# Patient Record
Sex: FEMALE | Race: WHITE | Employment: OTHER | URBAN - METROPOLITAN AREA
[De-identification: names, ages, dates, MRNs, and addresses within clinical notes are randomized per-mention and may not be internally consistent; named-entity substitution may affect disease eponyms.]

---

## 2018-06-06 ENCOUNTER — HOSPITAL ENCOUNTER (OUTPATIENT)
Dept: PHYSICAL THERAPY | Age: 68
Discharge: HOME | End: 2018-06-06
Payer: MEDICARE

## 2018-06-06 PROCEDURE — G8978 MOBILITY CURRENT STATUS: HCPCS

## 2018-06-06 PROCEDURE — G8979 MOBILITY GOAL STATUS: HCPCS

## 2018-06-06 PROCEDURE — 97162 PT EVAL MOD COMPLEX 30 MIN: CPT

## 2018-06-06 PROCEDURE — 97110 THERAPEUTIC EXERCISES: CPT

## 2018-06-06 PROCEDURE — 97140 MANUAL THERAPY 1/> REGIONS: CPT

## 2018-06-06 NOTE — THERAPY EVALUATION
Pasty Holstein  : 1950  Primary: Marge Mi Medicare Choice *  Secondary:  [facility] at [address], [place], [address] [place] [address] Street  [phone]   [phone]       OUTPATIENT PHYSICAL THERAPY:Initial Assessment 2018    ICD-10: Treatment Diagnosis: Pain in right knee (M25.561)  Treatment diagnosis 2: Pain in right hip (M25.551)  Treatment diagnosis 3: Pain in unspecified joint (M25.50)  Precautions: *FALL RISK*  Allergies: Review of patient's allergies indicates not on file. Fall Risk Score: 6 (? 5 = High Risk)  MD Orders: evaluate and treat  MEDICAL/REFERRING DIAGNOSIS:  Pain in unspecified joint [M25.50]  Right knee pain [M25.561]   DATE OF ONSET: Pain in right hip and knee for 2-3 months  REFERRING PHYSICIAN: Cirilo Berry MD  RETURN PHYSICIAN APPOINTMENT: 18     INITIAL ASSESSMENT:  Ms. Janee Tejada is a 76 y.o. female presenting to physical therapy with complaints of pain in right hip and knee for the past 2-3 months. Patient reports no specific injury prior to onset of pain. Pain is greater in the knee compared to hip. Pain is increased with activity and prolonged standing. Pain is 8/10 at worst and 4/10 at best.  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 Patient reports having a fall about 1 month ago but did not seek medical treatment. 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 Patient is a good candidate for skilled physical therapy services to include manual therapy, therapeutic exercise, and pain modalities as needed. PROBLEM LIST (Impacting functional limitations):  1. Decreased Strength  2. Decreased ADL/Functional Activities  3. Decreased Ambulation Ability/Technique  4. Decreased Balance  5. Increased Pain  6. Decreased Activity Tolerance  7. Increased Fatigue  8.  Decreased Flexibility/Joint Mobility INTERVENTIONS PLANNED:  1. Balance Exercise  2. Cold  3. Cryotherapy  4. Electrical Stimulation  5. Heat  6. Home Exercise Program (HEP)  7. Manual Therapy  8. Neuromuscular Re-education/Strengthening  9. Range of Motion (ROM)  10. Therapeutic Activites  11. Therapeutic Exercise/Strengthening  12. Transcutaneous Electrical Nerve Stimulation (TENS)  13. Ultrasound (US)   TREATMENT PLAN:  Effective Dates: 6/6/2018 TO 7/7/2018 (30 days). Frequency/Duration: 2 times a week for 30 Days  GOALS: (Goals have been discussed and agreed upon with patient.)  Short-Term Functional Goals: Time Frame: 6/6/18  1. Patient will be independent with home program to increase knee range of motion. 2. Patient will report no more than 7/10 pain in right knee during work shift. Discharge Goals: Time Frame: 6/6/18 to 7/6/18  1. Patient will report no more than 5/10 pain in right knee at the end of work shift. 2. Patient will increase range of motion in right knee to 0-115 degrees. 3. Patient will increase strength in bilateral hip abduction to 4/5.  4. Patient will be able to sit with right leg crossed over left in order to put on socks/shoes. 5. Patient will perform single leg balance greater than 10 seconds bilaterally to increase stability in standing. 6. Patient will improve Lower extremity functional scale score to 48/80 from 19/80. Rehabilitation Potential For Stated Goals: Good  Regarding Esther Ma's therapy, I certify that the treatment plan above will be carried out by a therapist or under their direction. Thank you for this referral,  Duy Serrato PT     Referring Physician Signature: Omkar Erickson MD              Date                    The information in this section was collected on 6/6/18 (except where otherwise noted). HISTORY:   History of Present Injury/Illness (Reason for Referral):  Ms. Matthew Veras is a 76 y.o. female presenting to physical therapy with complaints of pain in right hip and knee for the past 2-3 months.   Patient reports no specific injury prior to onset of pain. Pain is greater in the knee compared to hip. Pain is increased with activity and prolonged standing. Pain is 8/10 at worst and 4/10 at best.  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 Patient reports having a fall about 1 month ago but did not seek medical treatment. Patient works part time (20 hours a week) at a [employer] shop. Past Medical History/Comorbidities:   Ms. Flex Hannon  has no past medical history on file. Ms. Flex Hannon  has no past surgical history on file. Social History/Living Environment:    Patient lives at home with family. Prior Level of Function/Work/Activity:  Patient works part time (20 hours per week) at [employer]. Current Medications:     No current outpatient prescriptions on file. Daun Chard pressure  Levothyroxine-thyroid  Fametidine-heart burn  Tramadol-back pain  Ondansetron-nausea  Diphen/atropine-diarrhea     Date Last Reviewed:  6/7/2018   Number of Personal Factors/Comorbidities that affect the Plan of Care:  (age/work status, increased BMI, arhtirtis) 1-2: MODERATE COMPLEXITY   EXAMINATION:   Observation/Orthostatic Postural Assessment:          Patient ambulates independently with no significant gait deficits. Palpation:          Tender to medial joint line and inferior to patella. Tender along right IT band  ROM:            Knee AROM: left=0-115 degrees, right=02-90 degrees  Hamstrings: marked tightness bilaterally    Strength:            Knee extension: left=5/5, right=4-/5  Knee flexion: 4+/5, right=3+/5  Hip flexion: left=3+/5 with pain in back, right=3-/5  Hip adduction (supine): 3+/5 bilaterally  Hip abduction (supine): 3+/5 bilaterally    Special Tests:             Thessaly test=negative    Neurological Screen:        Myotomes:  Bilateral lower extremities are normal        Dermatomes:  Intact for light touch and sensation    Functional Mobility:         Transfers:  Mild difficulty        Bed Mobility:  Marked difficulty with bed rolling, patient unable to lay completely supine due to back pain    Balance:          Single leg stance=less than 3 seconds bilaterally     Body Structures Involved:  1. Bones  2. Joints  3. Muscles Body Functions Affected:  1. Sensory/Pain  2. Neuromusculoskeletal  3. Movement Related Activities and Participation Affected:  1. General Tasks and Demands  2. Mobility  3. Domestic Life  4. Community, Social and [place]   Number of elements (examined above) that affect the Plan of Care: 3: MODERATE COMPLEXITY   CLINICAL PRESENTATION:   Presentation: Evolving clinical presentation with changing clinical characteristics: MODERATE COMPLEXITY   CLINICAL DECISION MAKING:   Outcome Measure: Tool Used: Lower Extremity Functional Scale (LEFS)  Score:  Initial: 19/80 Most Recent: X/80 (Date: -- )   Interpretation of Score: 20 questions each scored on a 5 point scale with 0 representing \"extreme difficulty or unable to perform\" and 4 representing \"no difficulty\". The lower the score, the greater the functional disability. 80/80 represents no disability. Minimal detectable change is 9 points. Score 80 79-63 62-48 47-32 31-16 15-1 0   Modifier CH CI CJ CK CL CM CN     ? Mobility - Walking and Moving Around:     - CURRENT STATUS: CL - 60%-79% impaired, limited or restricted    - GOAL STATUS: CJ - 20%-39% impaired, limited or restricted    - D/C STATUS:  ---------------To be determined---------------    Medical Necessity:   · Patient is expected to demonstrate progress in strength, range of motion, balance, coordination and functional technique to decrease pain and increase tolerance for ADL's, weight bearing, and work duties. · Skilled intervention continues to be required due to pain and limitation in right hip and knee.   Reason for Services/Other Comments:  · Patient continues to require skilled intervention due to pain and limitation in right hip and knee. Use of outcome tool(s) and clinical judgement create a POC that gives a:  (mulitple co-morbidities, moderate to marked pain, marked limitation on outcome measure) Questionable prediction of patient's progress: MODERATE COMPLEXITY            TREATMENT:   (In addition to Assessment/Re-Assessment sessions the following treatments were rendered)  Pre-treatment Symptoms/Complaints:  Patient reports increased pain in right hip and knee with prolonged standing and activity, especially when working at [employer]. Pain: Initial:   Pain Intensity 1: 6  Pain Location 1: Knee  Pain Orientation 1: Right  Post Session:  Patient reports 5/10 pain     Therapeutic Exercise: (15 Minutes):  Exercises per grid below to improve mobility, strength, balance and coordination. Required minimal visual, verbal and manual cues to promote proper body alignment, promote proper body posture and promote proper body mechanics. Progressed resistance, range, repetitions and complexity of movement as indicated. Date:  6/6/18 Date:   Date:     Activity/Exercise Parameters Parameters Parameters   Heel slides [id number], AROM     Hip adduction 1x10, supine, ball squeeze     Supine march 1x10, right                               Manual Therapy (    Soft Tissue Mobilization Duration  Duration: 10 Minutes): Soft tissue mobilization to right lateral thigh/IT band in left side lying to decrease pain    Therapeutic Modalities: for pain and edema                                                                                               HEP: As above; handouts given to patient for all exercises. Treatment/Session Assessment:    · Response to Treatment:  Patient reported good relief with soft tissue mobilization to lateral right LE/IT band and reported no issues with exercises.   Patient showed good understanding of home program.  · Compliance with Program/Exercises: compliant most of the time.  · Recommendations/Intent for next treatment session: \"Next visit will focus on advancements to more challenging activities\".     Total Treatment Duration: 50 minutes; 25 minutes evaluation, 15 minutes exercise, 10 minutes manual therapy    PT Patient Time In/Time Out  Time In: 1640  Time Out: Alberta Garcia [address] Janice, PT

## 2018-06-06 NOTE — PROGRESS NOTES
Ambulatory/Rehab Services H2 Model Falls Risk Assessment    Risk Factor Pts. ·   Confusion/Disorientation/Impulsivity  []    4 ·   Symptomatic Depression  []   2 ·   Altered Elimination  []   1 ·   Dizziness/Vertigo  []   1 ·   Gender (Male)  []   1 ·   Any administered antiepileptics (anticonvulsants):  []   2 ·   Any administered benzodiazepines:  []   1 ·   Visual Impairment (specify):  []   1 ·   Portable Oxygen Use  []   1 ·   Orthostatic ? BP  []   1 ·   History of Recent Falls (within 3 mos.)  [x]   5     Ability to Rise from Chair (choose one) Pts. ·   Ability to rise in a single movement  []   0 ·   Pushes up, successful in one attempt  [x]   1 ·   Multiple attempts, but successful  []   3 ·   Unable to rise without assistance  []   4   Total: (5 or greater = High Risk) 6     Falls Prevention Plan:   [x]                Physical Limitations to Exercise (specify): Stand by assistance with all weight bearing   []                Mobility Assistance Device (type):   []                Exercise/Equipment Adaptation (specify):    ©2010 [facility] [address] [place] [address] Patent #0200,[id number].  Federal Law prohibits the replication, distribution or use without written permission from [facility]

## 2018-06-11 ENCOUNTER — HOSPITAL ENCOUNTER (OUTPATIENT)
Dept: PHYSICAL THERAPY | Age: 68
Discharge: HOME | End: 2018-06-11
Payer: MEDICARE

## 2018-06-11 PROCEDURE — 97140 MANUAL THERAPY 1/> REGIONS: CPT

## 2018-06-11 PROCEDURE — 97035 APP MDLTY 1+ULTRASOUND EA 15: CPT

## 2018-06-11 PROCEDURE — 97110 THERAPEUTIC EXERCISES: CPT

## 2018-06-11 NOTE — PROGRESS NOTES
Jayy Soriano  : 1950  Primary: Gil Hahn Medicare Choice *  Secondary:  [facility] at [address], [place], [address] [place] [address]  [phone]   [phone]       OUTPATIENT PHYSICAL THERAPY:Daily Note 2018    ICD-10: Treatment Diagnosis: Pain in right knee (M25.561)  Treatment diagnosis 2: Pain in right hip (M25.551)  Treatment diagnosis 3: Pain in unspecified joint (M25.50)  Precautions: *FALL RISK*  Allergies: Review of patient's allergies indicates not on file. Fall Risk Score: 6 (? 5 = High Risk)  MD Orders: evaluate and treat  MEDICAL/REFERRING DIAGNOSIS:  Pain in unspecified joint [M25.50]  Right knee pain [M25.561]   DATE OF ONSET: Pain in right hip and knee for 2-3 months  REFERRING PHYSICIAN: Lieutenant Renny MD  RETURN PHYSICIAN APPOINTMENT: 18     INITIAL ASSESSMENT:  Ms. Chang Gatica is a 76 y.o. female presenting to physical therapy with complaints of pain in right hip and knee for the past 2-3 months. Patient reports no specific injury prior to onset of pain. Pain is greater in the knee compared to hip. Pain is increased with activity and prolonged standing. Pain is 8/10 at worst and 4/10 at best.  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 Patient reports having a fall about 1 month ago but did not seek medical treatment. 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 Patient is a good candidate for skilled physical therapy services to include manual therapy, therapeutic exercise, and pain modalities as needed. PROBLEM LIST (Impacting functional limitations):  1. Decreased Strength  2. Decreased ADL/Functional Activities  3. Decreased Ambulation Ability/Technique  4. Decreased Balance  5. Increased Pain  6. Decreased Activity Tolerance  7. Increased Fatigue  8.  Decreased Flexibility/Joint Mobility INTERVENTIONS PLANNED:  1. Balance Exercise  2. Cold  3. Cryotherapy  4. Electrical Stimulation  5. Heat  6. Home Exercise Program (HEP)  7. Manual Therapy  8. Neuromuscular Re-education/Strengthening  9. Range of Motion (ROM)  10. Therapeutic Activites  11. Therapeutic Exercise/Strengthening  12. Transcutaneous Electrical Nerve Stimulation (TENS)  13. Ultrasound (US)   TREATMENT PLAN:  Effective Dates: 6/6/2018 TO 7/7/2018 (30 days). Frequency/Duration: 2 times a week for 30 Days  GOALS: (Goals have been discussed and agreed upon with patient.)  Short-Term Functional Goals: Time Frame: 6/6/18  1. Patient will be independent with home program to increase knee range of motion. 2. Patient will report no more than 7/10 pain in right knee during work shift. Discharge Goals: Time Frame: 6/6/18 to 7/6/18  1. Patient will report no more than 5/10 pain in right knee at the end of work shift. 2. Patient will increase range of motion in right knee to 0-115 degrees. 3. Patient will increase strength in bilateral hip abduction to 4/5.  4. Patient will be able to sit with right leg crossed over left in order to put on socks/shoes. 5. Patient will perform single leg balance greater than 10 seconds bilaterally to increase stability in standing. 6. Patient will improve Lower extremity functional scale score to 48/80 from 19/80. Rehabilitation Potential For Stated Goals: Good  Regarding Juan Ma's therapy, I certify that the treatment plan above will be carried out by a therapist or under their direction. Thank you for this referral,  Becca Jurado PT     Referring Physician Signature: Janine Garvey MD              Date                    The information in this section was collected on 6/6/18 (except where otherwise noted). HISTORY:   History of Present Injury/Illness (Reason for Referral):  Ms. Dayanara Vaughn is a 76 y.o. female presenting to physical therapy with complaints of pain in right hip and knee for the past 2-3 months.   Patient reports no specific injury prior to onset of pain. Pain is greater in the knee compared to hip. Pain is increased with activity and prolonged standing. Pain is 8/10 at worst and 4/10 at best.  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 Patient reports having a fall about 1 month ago but did not seek medical treatment. Patient works part time (20 hours a week) at a [employer] shop. Past Medical History/Comorbidities:   Ms. Rangel Spears  has no past medical history on file. Ms. Rangel Spears  has no past surgical history on file. Social History/Living Environment:    Patient lives at home with family. Prior Level of Function/Work/Activity:  Patient works part time (20 hours per week) at [employer]. Current Medications:     No current outpatient prescriptions on file. Gearold Showers pressure  Levothyroxine-thyroid  Fametidine-heart burn  Tramadol-back pain  Ondansetron-nausea  Diphen/atropine-diarrhea     Date Last Reviewed:  6/11/2018   Number of Personal Factors/Comorbidities that affect the Plan of Care:  (age/work status, increased BMI, arhtirtis) 1-2: MODERATE COMPLEXITY   EXAMINATION:   Observation/Orthostatic Postural Assessment:          Patient ambulates independently with no significant gait deficits. Palpation:          Tender to medial joint line and inferior to patella. Tender along right IT band  ROM:            Knee AROM: left=0-115 degrees, right=02-90 degrees  Hamstrings: marked tightness bilaterally    Strength:            Knee extension: left=5/5, right=4-/5  Knee flexion: 4+/5, right=3+/5  Hip flexion: left=3+/5 with pain in back, right=3-/5  Hip adduction (supine): 3+/5 bilaterally  Hip abduction (supine): 3+/5 bilaterally    Special Tests:             Thessaly test=negative    Neurological Screen:        Myotomes:  Bilateral lower extremities are normal        Dermatomes:  Intact for light touch and sensation    Functional Mobility:         Transfers:  Mild difficulty        Bed Mobility:  Marked difficulty with bed rolling, patient unable to lay completely supine due to back pain    Balance:          Single leg stance=less than 3 seconds bilaterally     Body Structures Involved:  1. Bones  2. Joints  3. Muscles Body Functions Affected:  1. Sensory/Pain  2. Neuromusculoskeletal  3. Movement Related Activities and Participation Affected:  1. General Tasks and Demands  2. Mobility  3. Domestic Life  4. Community, Social and [place]   Number of elements (examined above) that affect the Plan of Care: 3: MODERATE COMPLEXITY   CLINICAL PRESENTATION:   Presentation: Evolving clinical presentation with changing clinical characteristics: MODERATE COMPLEXITY   CLINICAL DECISION MAKING:   Outcome Measure: Tool Used: Lower Extremity Functional Scale (LEFS)  Score:  Initial: 19/80 Most Recent: X/80 (Date: -- )   Interpretation of Score: 20 questions each scored on a 5 point scale with 0 representing \"extreme difficulty or unable to perform\" and 4 representing \"no difficulty\". The lower the score, the greater the functional disability. 80/80 represents no disability. Minimal detectable change is 9 points. Score 80 79-63 62-48 47-32 31-16 15-1 0   Modifier CH CI CJ CK CL CM CN     ? Mobility - Walking and Moving Around:     - CURRENT STATUS: CL - 60%-79% impaired, limited or restricted    - GOAL STATUS: CJ - 20%-39% impaired, limited or restricted    - D/C STATUS:  ---------------To be determined---------------    Medical Necessity:   · Patient is expected to demonstrate progress in strength, range of motion, balance, coordination and functional technique to decrease pain and increase tolerance for ADL's, weight bearing, and work duties. · Skilled intervention continues to be required due to pain and limitation in right hip and knee.   Reason for Services/Other Comments:  · Patient continues to require skilled intervention due to pain and limitation in right hip and knee. Use of outcome tool(s) and clinical judgement create a POC that gives a:  (mulitple co-morbidities, moderate to marked pain, marked limitation on outcome measure) Questionable prediction of patient's progress: MODERATE COMPLEXITY            TREATMENT:   (In addition to Assessment/Re-Assessment sessions the following treatments were rendered)  Pre-treatment Symptoms/Complaints:  Patient reports increased pain in right hip and knee with prolonged standing and activity, especially when working at [employer]. Pain: Initial:   Pain Intensity 1: 6  Pain Location 1: Knee  Pain Orientation 1: Right  Post Session:  Patient reports 1/10 pain     Therapeutic Exercise: (20 Minutes):  Exercises per grid below to improve mobility, strength, balance and coordination. Required minimal visual, verbal and manual cues to promote proper body alignment, promote proper body posture and promote proper body mechanics. Progressed resistance, range, repetitions and complexity of movement as indicated. Date:  6/6/18 Date:  6-11-18 Date:     Activity/Exercise Parameters Parameters Parameters   Heel slides [id number], AROM 2 x 10    Hip adduction 1x10, supine, ball squeeze Ball x 10    Supine march 1x10, right     Calf stretch  Strap 5 x 20 sec    Hamstring stretch  Strap 3 x 20 sec    SLR  X 10    Ankle pumps  2 x 10                  Manual Therapy (    Soft Tissue Mobilization Duration  Duration: 25 Minutes):  Soft tissue mobilization to right lateral thigh/IT band, distal quad and medial knee in left side lying to decrease pain    Therapeutic Modalities: for pain and edema                                                                  Right Knee Ultrasound  Delivery: Pulsed  Duty Cycle: 50 %  Frequency: 1 mHz  Intensity: 1.3 vuong/cm sq  Duration : 10 minutes  Patient Position: Supine                            HEP: As above; handouts given to patient for all exercises. Treatment/Session Assessment:    · Response to Treatment:  Decreased pain and tightness after session  · Compliance with Program/Exercises: compliant most of the time. · Recommendations/Intent for next treatment session: \"Next visit will focus on advancements to more challenging activities\".     Total Treatment Duration: 55 minutes    PT Patient Time In/Time Out  Time In: 1530  Time Out: 1630    Elaine Segovia, PTA

## 2018-06-13 ENCOUNTER — APPOINTMENT (OUTPATIENT)
Dept: PHYSICAL THERAPY | Age: 68
End: 2018-06-13
Payer: MEDICARE

## 2018-06-18 ENCOUNTER — HOSPITAL ENCOUNTER (OUTPATIENT)
Dept: PHYSICAL THERAPY | Age: 68
Discharge: HOME | End: 2018-06-18
Payer: MEDICARE

## 2018-06-18 PROCEDURE — 97110 THERAPEUTIC EXERCISES: CPT

## 2018-06-18 PROCEDURE — 97140 MANUAL THERAPY 1/> REGIONS: CPT

## 2018-06-18 PROCEDURE — 97035 APP MDLTY 1+ULTRASOUND EA 15: CPT

## 2018-06-18 NOTE — PROGRESS NOTES
Rani Escobar  : 1950  Primary: Berenice Monterroso Medicare Choice *  Secondary:  [facility] at [address], [place], [address] [place] [address] Street  [phone]   [phone]       OUTPATIENT PHYSICAL THERAPY:Daily Note 2018    ICD-10: Treatment Diagnosis: Pain in right knee (M25.561)  Treatment diagnosis 2: Pain in right hip (M25.551)  Treatment diagnosis 3: Pain in unspecified joint (M25.50)  Precautions: *FALL RISK*  Allergies: Review of patient's allergies indicates not on file. Fall Risk Score: 6 (? 5 = High Risk)  MD Orders: evaluate and treat  MEDICAL/REFERRING DIAGNOSIS:  Pain in unspecified joint [M25.50]  Right knee pain [M25.561]   DATE OF ONSET: Pain in right hip and knee for 2-3 months  REFERRING PHYSICIAN: Janine Garvey MD  RETURN PHYSICIAN APPOINTMENT: 18     INITIAL ASSESSMENT:  Ms. Dayanara Vaughn is a 76 y.o. female presenting to physical therapy with complaints of pain in right hip and knee for the past 2-3 months. Patient reports no specific injury prior to onset of pain. Pain is greater in the knee compared to hip. Pain is increased with activity and prolonged standing. Pain is 8/10 at worst and 4/10 at best.  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 Patient reports having a fall about 1 month ago but did not seek medical treatment. 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 Patient is a good candidate for skilled physical therapy services to include manual therapy, therapeutic exercise, and pain modalities as needed. PROBLEM LIST (Impacting functional limitations):  1. Decreased Strength  2. Decreased ADL/Functional Activities  3. Decreased Ambulation Ability/Technique  4. Decreased Balance  5. Increased Pain  6. Decreased Activity Tolerance  7. Increased Fatigue  8.  Decreased Flexibility/Joint Mobility INTERVENTIONS PLANNED:  1. Balance Exercise  2. Cold  3. Cryotherapy  4. Electrical Stimulation  5. Heat  6. Home Exercise Program (HEP)  7. Manual Therapy  8. Neuromuscular Re-education/Strengthening  9. Range of Motion (ROM)  10. Therapeutic Activites  11. Therapeutic Exercise/Strengthening  12. Transcutaneous Electrical Nerve Stimulation (TENS)  13. Ultrasound (US)   TREATMENT PLAN:  Effective Dates: 6/6/2018 TO 7/7/2018 (30 days). Frequency/Duration: 2 times a week for 30 Days  GOALS: (Goals have been discussed and agreed upon with patient.)  Short-Term Functional Goals: Time Frame: 6/6/18  1. Patient will be independent with home program to increase knee range of motion. 2. Patient will report no more than 7/10 pain in right knee during work shift. Discharge Goals: Time Frame: 6/6/18 to 7/6/18  1. Patient will report no more than 5/10 pain in right knee at the end of work shift. 2. Patient will increase range of motion in right knee to 0-115 degrees. 3. Patient will increase strength in bilateral hip abduction to 4/5.  4. Patient will be able to sit with right leg crossed over left in order to put on socks/shoes. 5. Patient will perform single leg balance greater than 10 seconds bilaterally to increase stability in standing. 6. Patient will improve Lower extremity functional scale score to 48/80 from 19/80. Rehabilitation Potential For Stated Goals: Good  Regarding Tatyana Ma's therapy, I certify that the treatment plan above will be carried out by a therapist or under their direction. Thank you for this referral,  Tami Snowden PT     Referring Physician Signature: Kaley Woods MD              Date                    The information in this section was collected on 6/6/18 (except where otherwise noted). HISTORY:   History of Present Injury/Illness (Reason for Referral):  Ms. Fátima Blue is a 76 y.o. female presenting to physical therapy with complaints of pain in right hip and knee for the past 2-3 months.   Patient reports no specific injury prior to onset of pain. Pain is greater in the knee compared to hip. Pain is increased with activity and prolonged standing. Pain is 8/10 at worst and 4/10 at best.  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 Patient reports having a fall about 1 month ago but did not seek medical treatment. Patient works part time (20 hours a week) at a [employer] shop. Past Medical History/Comorbidities:   Ms. Bette Pinto  has no past medical history on file. Ms. Bette Pinto  has no past surgical history on file. Social History/Living Environment:    Patient lives at home with family. Prior Level of Function/Work/Activity:  Patient works part time (20 hours per week) at [employer]. Current Medications:     No current outpatient prescriptions on file. Haugen Miracle pressure  Levothyroxine-thyroid  Fametidine-heart burn  Tramadol-back pain  Ondansetron-nausea  Diphen/atropine-diarrhea     Date Last Reviewed:  6/18/2018   Number of Personal Factors/Comorbidities that affect the Plan of Care:  (age/work status, increased BMI, arhtirtis) 1-2: MODERATE COMPLEXITY   EXAMINATION:   Observation/Orthostatic Postural Assessment:          Patient ambulates independently with no significant gait deficits. Palpation:          Tender to medial joint line and inferior to patella. Tender along right IT band  ROM:            Knee AROM: left=0-115 degrees, right=02-90 degrees  Hamstrings: marked tightness bilaterally    Strength:            Knee extension: left=5/5, right=4-/5  Knee flexion: 4+/5, right=3+/5  Hip flexion: left=3+/5 with pain in back, right=3-/5  Hip adduction (supine): 3+/5 bilaterally  Hip abduction (supine): 3+/5 bilaterally    Special Tests:             Thessaly test=negative    Neurological Screen:        Myotomes:  Bilateral lower extremities are normal        Dermatomes:  Intact for light touch and sensation    Functional Mobility:         Transfers:  Mild difficulty        Bed Mobility:  Marked difficulty with bed rolling, patient unable to lay completely supine due to back pain    Balance:          Single leg stance=less than 3 seconds bilaterally     Body Structures Involved:  1. Bones  2. Joints  3. Muscles Body Functions Affected:  1. Sensory/Pain  2. Neuromusculoskeletal  3. Movement Related Activities and Participation Affected:  1. General Tasks and Demands  2. Mobility  3. Domestic Life  4. Community, Social and [place]   Number of elements (examined above) that affect the Plan of Care: 3: MODERATE COMPLEXITY   CLINICAL PRESENTATION:   Presentation: Evolving clinical presentation with changing clinical characteristics: MODERATE COMPLEXITY   CLINICAL DECISION MAKING:   Outcome Measure: Tool Used: Lower Extremity Functional Scale (LEFS)  Score:  Initial: 19/80 Most Recent: X/80 (Date: -- )   Interpretation of Score: 20 questions each scored on a 5 point scale with 0 representing \"extreme difficulty or unable to perform\" and 4 representing \"no difficulty\". The lower the score, the greater the functional disability. 80/80 represents no disability. Minimal detectable change is 9 points. Score 80 79-63 62-48 47-32 31-16 15-1 0   Modifier CH CI CJ CK CL CM CN     ? Mobility - Walking and Moving Around:     - CURRENT STATUS: CL - 60%-79% impaired, limited or restricted    - GOAL STATUS: CJ - 20%-39% impaired, limited or restricted    - D/C STATUS:  ---------------To be determined---------------    Medical Necessity:   · Patient is expected to demonstrate progress in strength, range of motion, balance, coordination and functional technique to decrease pain and increase tolerance for ADL's, weight bearing, and work duties. · Skilled intervention continues to be required due to pain and limitation in right hip and knee.   Reason for Services/Other Comments:  · Patient continues to require skilled intervention due to pain and limitation in right hip and knee. Use of outcome tool(s) and clinical judgement create a POC that gives a:  (mulitple co-morbidities, moderate to marked pain, marked limitation on outcome measure) Questionable prediction of patient's progress: MODERATE COMPLEXITY            TREATMENT:   (In addition to Assessment/Re-Assessment sessions the following treatments were rendered)  Pre-treatment Symptoms/Complaints:  Patient stated she had less pain and tightness today in her right LE. Pain: Initial:   Pain Intensity 1: 3  Pain Location 1: Knee, Leg  Pain Orientation 1: Right  Post Session:  Patient reports 1/10 pain     Therapeutic Exercise: (30 Minutes):  Exercises per grid below to improve mobility, strength, balance and coordination. Required minimal visual, verbal and manual cues to promote proper body alignment, promote proper body posture and promote proper body mechanics. Progressed resistance, range, repetitions and complexity of movement as indicated. Date:  6/6/18 Date:  6-11-18 Date:  6-18-18   Activity/Exercise Parameters Parameters Parameters   Heel slides [id number], AROM 2 x 10 2x10 reps    Hip adduction 1x10, supine, ball squeeze Ball x 10 Yellow ball x 10 reps x 10 sec hold    Supine march 1x10, right  X 20 reps   Calf stretch  Strap 5 x 20 sec On incline 4x30 sec hold    Hamstring stretch  Strap 3 x 20 sec Strap 4x30 sec hold BLE's    SLR  X 10 X 10 reps    Ankle pumps  2 x 10 X 30 reps    I T band stretch   4x30 sec hold with strap   Nu step    Level 4 x 8 mins    Calf stretch   4x30 sec hold on incline     Manual Therapy (    Soft Tissue Mobilization Duration  Duration: 20 Minutes): Soft tissue mobilization to right lateral thigh/IT band, distal quad and medial knee in left side lying to decrease pain using medium suction cup and biofreeze to decrease tightness in the IT band.      Therapeutic Modalities: for pain and edema Right Knee Ultrasound  Delivery: Pulsed  Duty Cycle: 50 %  Frequency: 1 mHz  Intensity: 1.5 vuong/cm sq  Duration : 10 minutes  Patient Position: Supine                            HEP: As above; handouts given to patient for all exercises. Treatment/Session Assessment:    · Response to Treatment:  Decreased pain and pt. Was compliant with all exercises. · Compliance with Program/Exercises: compliant most of the time. · Recommendations/Intent for next treatment session: \"Next visit will focus on advancements to more challenging activities\".     Total Treatment Duration: 60 minutes    PT Patient Time In/Time Out  Time In: 4814  Time Out: [address]

## 2018-06-21 ENCOUNTER — HOSPITAL ENCOUNTER (OUTPATIENT)
Dept: PHYSICAL THERAPY | Age: 68
Discharge: HOME | End: 2018-06-21
Payer: MEDICARE

## 2018-06-21 PROCEDURE — 97035 APP MDLTY 1+ULTRASOUND EA 15: CPT

## 2018-06-21 PROCEDURE — 97110 THERAPEUTIC EXERCISES: CPT

## 2018-06-21 PROCEDURE — 97140 MANUAL THERAPY 1/> REGIONS: CPT

## 2018-06-21 NOTE — PROGRESS NOTES
Bernadette Zarco  : 1950  Primary: Marlen Hurley Medicare Choice *  Secondary:  [facility] [address], [place], [address] [place] [address] Street  [phone]   [phone]       OUTPATIENT PHYSICAL THERAPY:Daily Note 2018    ICD-10: Treatment Diagnosis: Pain in right knee (M25.561)  Treatment diagnosis 2: Pain in right hip (M25.551)  Treatment diagnosis 3: Pain in unspecified joint (M25.50)  Precautions: *FALL RISK*  Allergies: Review of patient's allergies indicates not on file. Fall Risk Score: 6 (? 5 = High Risk)  MD Orders: evaluate and treat  MEDICAL/REFERRING DIAGNOSIS:  Pain in unspecified joint [M25.50]  Right knee pain [M25.561]   DATE OF ONSET: Pain in right hip and knee for 2-3 months  REFERRING PHYSICIAN: Suleiman Sifuentes MD  RETURN PHYSICIAN APPOINTMENT: 18     INITIAL ASSESSMENT:  Ms. Celeste Simpson is a 76 y.o. female presenting to physical therapy with complaints of pain in right hip and knee for the past 2-3 months. Patient reports no specific injury prior to onset of pain. Pain is greater in the knee compared to hip. Pain is increased with activity and prolonged standing. Pain is 8/10 at worst and 4/10 at best.  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 Patient reports having a fall about 1 month ago but did not seek medical treatment. 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 Patient is a good candidate for skilled physical therapy services to include manual therapy, therapeutic exercise, and pain modalities as needed. PROBLEM LIST (Impacting functional limitations):  1. Decreased Strength  2. Decreased ADL/Functional Activities  3. Decreased Ambulation Ability/Technique  4. Decreased Balance  5. Increased Pain  6. Decreased Activity Tolerance  7. Increased Fatigue  8.  Decreased Flexibility/Joint Mobility INTERVENTIONS PLANNED:  1. Balance Exercise  2. Cold  3. Cryotherapy  4. Electrical Stimulation  5. Heat  6. Home Exercise Program (HEP)  7. Manual Therapy  8. Neuromuscular Re-education/Strengthening  9. Range of Motion (ROM)  10. Therapeutic Activites  11. Therapeutic Exercise/Strengthening  12. Transcutaneous Electrical Nerve Stimulation (TENS)  13. Ultrasound (US)   TREATMENT PLAN:  Effective Dates: 6/6/2018 TO 7/7/2018 (30 days). Frequency/Duration: 2 times a week for 30 Days  GOALS: (Goals have been discussed and agreed upon with patient.)  Short-Term Functional Goals: Time Frame: 6/6/18  1. Patient will be independent with home program to increase knee range of motion. 2. Patient will report no more than 7/10 pain in right knee during work shift. Discharge Goals: Time Frame: 6/6/18 to 7/6/18  1. Patient will report no more than 5/10 pain in right knee at the end of work shift. 2. Patient will increase range of motion in right knee to 0-115 degrees. 3. Patient will increase strength in bilateral hip abduction to 4/5.  4. Patient will be able to sit with right leg crossed over left in order to put on socks/shoes. 5. Patient will perform single leg balance greater than 10 seconds bilaterally to increase stability in standing. 6. Patient will improve Lower extremity functional scale score to 48/80 from 19/80. Rehabilitation Potential For Stated Goals: Good  Regarding Osmar Luis Maldonadourston's therapy, I certify that the treatment plan above will be carried out by a therapist or under their direction. Thank you for this referral,  Neisha Cowart PT     Referring Physician Signature: Ginette Gallegos MD              Date                    The information in this section was collected on 6/6/18 (except where otherwise noted). HISTORY:   History of Present Injury/Illness (Reason for Referral):  Ms. Mitra Bond is a 76 y.o. female presenting to physical therapy with complaints of pain in right hip and knee for the past 2-3 months.   Patient reports no specific injury prior to onset of pain. Pain is greater in the knee compared to hip. Pain is increased with activity and prolonged standing. Pain is 8/10 at worst and 4/10 at best.  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 Patient reports having a fall about 1 month ago but did not seek medical treatment. Patient works part time (20 hours a week) at a [employer] shop. Past Medical History/Comorbidities:   Ms. Fahad Gomez  has no past medical history on file. Ms. Fahad Gomez  has no past surgical history on file. Social History/Living Environment:    Patient lives at home with family. Prior Level of Function/Work/Activity:  Patient works part time (20 hours per week) at [employer]. Current Medications:     No current outpatient prescriptions on file. Darral Warren pressure  Levothyroxine-thyroid  Fametidine-heart burn  Tramadol-back pain  Ondansetron-nausea  Diphen/atropine-diarrhea     Date Last Reviewed:  6/21/2018   Number of Personal Factors/Comorbidities that affect the Plan of Care:  (age/work status, increased BMI, arhtirtis) 1-2: MODERATE COMPLEXITY   EXAMINATION:   Observation/Orthostatic Postural Assessment:          Patient ambulates independently with no significant gait deficits. Palpation:          Tender to medial joint line and inferior to patella. Tender along right IT band  ROM:            Knee AROM: left=0-115 degrees, right=02-90 degrees  Hamstrings: marked tightness bilaterally    Strength:            Knee extension: left=5/5, right=4-/5  Knee flexion: 4+/5, right=3+/5  Hip flexion: left=3+/5 with pain in back, right=3-/5  Hip adduction (supine): 3+/5 bilaterally  Hip abduction (supine): 3+/5 bilaterally    Special Tests:             Thessaly test=negative    Neurological Screen:        Myotomes:  Bilateral lower extremities are normal        Dermatomes:  Intact for light touch and sensation    Functional Mobility:         Transfers:  Mild difficulty        Bed Mobility:  Marked difficulty with bed rolling, patient unable to lay completely supine due to back pain    Balance:          Single leg stance=less than 3 seconds bilaterally     Body Structures Involved:  1. Bones  2. Joints  3. Muscles Body Functions Affected:  1. Sensory/Pain  2. Neuromusculoskeletal  3. Movement Related Activities and Participation Affected:  1. General Tasks and Demands  2. Mobility  3. Domestic Life  4. Community, Social and [place]   Number of elements (examined above) that affect the Plan of Care: 3: MODERATE COMPLEXITY   CLINICAL PRESENTATION:   Presentation: Evolving clinical presentation with changing clinical characteristics: MODERATE COMPLEXITY   CLINICAL DECISION MAKING:   Outcome Measure: Tool Used: Lower Extremity Functional Scale (LEFS)  Score:  Initial: 19/80 Most Recent: X/80 (Date: -- )   Interpretation of Score: 20 questions each scored on a 5 point scale with 0 representing \"extreme difficulty or unable to perform\" and 4 representing \"no difficulty\". The lower the score, the greater the functional disability. 80/80 represents no disability. Minimal detectable change is 9 points. Score 80 79-63 62-48 47-32 31-16 15-1 0   Modifier CH CI CJ CK CL CM CN     ? Mobility - Walking and Moving Around:     - CURRENT STATUS: CL - 60%-79% impaired, limited or restricted    - GOAL STATUS: CJ - 20%-39% impaired, limited or restricted    - D/C STATUS:  ---------------To be determined---------------    Medical Necessity:   · Patient is expected to demonstrate progress in strength, range of motion, balance, coordination and functional technique to decrease pain and increase tolerance for ADL's, weight bearing, and work duties. · Skilled intervention continues to be required due to pain and limitation in right hip and knee.   Reason for Services/Other Comments:  · Patient continues to require skilled intervention due to pain and limitation in right hip and knee. Use of outcome tool(s) and clinical judgement create a POC that gives a:  (mulitple co-morbidities, moderate to marked pain, marked limitation on outcome measure) Questionable prediction of patient's progress: MODERATE COMPLEXITY            TREATMENT:   (In addition to Assessment/Re-Assessment sessions the following treatments were rendered)  Pre-treatment Symptoms/Complaints:  Patient states her knee is improving. Main C/O today is increased pain IT band     Pain: Initial:   Pain Intensity 1: 6  Pain Location 1: Leg  Pain Orientation 1: Right, Lateral  Post Session:  Patient reports 1/10 pain     Therapeutic Exercise: (30 Minutes):  Exercises per grid below to improve mobility, strength, balance and coordination. Required minimal visual, verbal and manual cues to promote proper body alignment, promote proper body posture and promote proper body mechanics. Progressed resistance, range, repetitions and complexity of movement as indicated. Date:  6/21/18 Date:  6-11-18 Date:  6-18-18   Activity/Exercise Parameters Parameters Parameters   Heel slides 2 x 10  1.5 lb 2 x 10 2 x 10 2x10 reps    Hip adduction  Ball x 10 Yellow ball x 10 reps x 10 sec hold    Supine march   X 20 reps   Calf stretch Strap 3 x 30 sec Strap 5 x 20 sec On incline 4x30 sec hold    Hamstring stretch Strap 5 x 10 sec Strap 3 x 20 sec Strap 4x30 sec hold BLE's    SLR 1 x 10  1.5 lb 3 x 10 X 10 X 10 reps    Ankle pumps 3 x 10 with heat 2 x 10 X 30 reps    I T band stretch   4x30 sec hold with strap   Nu step    Level 4 x 8 mins    Sit to stand 2 x 5     Quad sets 2 x 10 with heat     Hip abduction Side lying 1.5 lbs 2 x 10             Manual Therapy (    Soft Tissue Mobilization Duration  Duration: 20 Minutes): Soft tissue mobilization to right lateral thigh/IT band, distal quad and medial knee in supine  .      Therapeutic Modalities: for pain and edema       Right Knee Heat  Type: Moist pack  Duration : 10 minutes  Patient Position: Supine                                                          Right Knee Ultrasound  Delivery: Continuous  Frequency: 1 mHz  Intensity: 1.5 vuong/cm sq  Duration : 10 minutes  Patient Position: Supine                            HEP: As above; handouts given to patient for all exercises. Treatment/Session Assessment:    · Response to Treatment:  Decreased pain after session  · Compliance with Program/Exercises: compliant most of the time. · Recommendations/Intent for next treatment session: \"Next visit will focus on advancements to more challenging activities\".     Total Treatment Duration: 60 minutes    PT Patient Time In/Time Out  Time In: 7211  Time Out: [address] [facility],[address], [place]

## 2018-06-27 ENCOUNTER — HOSPITAL ENCOUNTER (OUTPATIENT)
Dept: PHYSICAL THERAPY | Age: 68
Discharge: HOME | End: 2018-06-27
Payer: MEDICARE

## 2018-06-27 PROCEDURE — 97140 MANUAL THERAPY 1/> REGIONS: CPT

## 2018-06-27 PROCEDURE — 97110 THERAPEUTIC EXERCISES: CPT

## 2018-06-27 NOTE — PROGRESS NOTES
Varsha Gr  : 1950  Primary: August Hutchins Medicare Choice *  Secondary:  [facility] [address], [place], [address] [place] [address] Street  [phone]   [phone]       OUTPATIENT PHYSICAL THERAPY:Daily Note 2018    ICD-10: Treatment Diagnosis: Pain in right knee (M25.561)  Treatment diagnosis 2: Pain in right hip (M25.551)  Treatment diagnosis 3: Pain in unspecified joint (M25.50)  Precautions: *FALL RISK*  Allergies: Review of patient's allergies indicates not on file. Fall Risk Score: 6 (? 5 = High Risk)  MD Orders: evaluate and treat  MEDICAL/REFERRING DIAGNOSIS:  Pain in unspecified joint [M25.50]  Right knee pain [M25.561]   DATE OF ONSET: Pain in right hip and knee for 2-3 months  REFERRING PHYSICIAN: Jackie Spain MD  RETURN PHYSICIAN APPOINTMENT: 18     INITIAL ASSESSMENT:  Ms. Noreen Youssef is a 76 y.o. female presenting to physical therapy with complaints of pain in right hip and knee for the past 2-3 months. Patient reports no specific injury prior to onset of pain. Pain is greater in the knee compared to hip. Pain is increased with activity and prolonged standing. Pain is 8/10 at worst and 4/10 at best.  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 Patient reports having a fall about 1 month ago but did not seek medical treatment. 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 Patient is a good candidate for skilled physical therapy services to include manual therapy, therapeutic exercise, and pain modalities as needed. PROBLEM LIST (Impacting functional limitations):  1. Decreased Strength  2. Decreased ADL/Functional Activities  3. Decreased Ambulation Ability/Technique  4. Decreased Balance  5. Increased Pain  6. Decreased Activity Tolerance  7. Increased Fatigue  8.  Decreased Flexibility/Joint Mobility INTERVENTIONS PLANNED:  1. Balance Exercise  2. Cold  3. Cryotherapy  4. Electrical Stimulation  5. Heat  6. Home Exercise Program (HEP)  7. Manual Therapy  8. Neuromuscular Re-education/Strengthening  9. Range of Motion (ROM)  10. Therapeutic Activites  11. Therapeutic Exercise/Strengthening  12. Transcutaneous Electrical Nerve Stimulation (TENS)  13. Ultrasound (US)   TREATMENT PLAN:  Effective Dates: 6/6/2018 TO 7/7/2018 (30 days). Frequency/Duration: 2 times a week for 30 Days  GOALS: (Goals have been discussed and agreed upon with patient.)  Short-Term Functional Goals: Time Frame: 6/6/18  1. Patient will be independent with home program to increase knee range of motion. 2. Patient will report no more than 7/10 pain in right knee during work shift. Discharge Goals: Time Frame: 6/6/18 to 7/6/18  1. Patient will report no more than 5/10 pain in right knee at the end of work shift. 2. Patient will increase range of motion in right knee to 0-115 degrees. 3. Patient will increase strength in bilateral hip abduction to 4/5.  4. Patient will be able to sit with right leg crossed over left in order to put on socks/shoes. 5. Patient will perform single leg balance greater than 10 seconds bilaterally to increase stability in standing. 6. Patient will improve Lower extremity functional scale score to 48/80 from 19/80. Rehabilitation Potential For Stated Goals: Good  Regarding Sivakumar Ma's therapy, I certify that the treatment plan above will be carried out by a therapist or under their direction. Thank you for this referral,  Atif Ledbetter PT     Referring Physician Signature: Luetta Aase, MD              Date                    The information in this section was collected on 6/6/18 (except where otherwise noted). HISTORY:   History of Present Injury/Illness (Reason for Referral):  Ms. Jennifer Baker is a 76 y.o. female presenting to physical therapy with complaints of pain in right hip and knee for the past 2-3 months.   Patient reports no specific injury prior to onset of pain. Pain is greater in the knee compared to hip. Pain is increased with activity and prolonged standing. Pain is 8/10 at worst and 4/10 at best.  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 Patient reports having a fall about 1 month ago but did not seek medical treatment. Patient works part time (20 hours a week) at a [employer] shop. Past Medical History/Comorbidities:   Ms. Terence Duenas  has no past medical history on file. Ms. Terence Duenas  has no past surgical history on file. Social History/Living Environment:    Patient lives at home with family. Prior Level of Function/Work/Activity:  Patient works part time (20 hours per week) at [employer]. Current Medications:     No current outpatient prescriptions on file. [place] Ates pressure  Levothyroxine-thyroid  Fametidine-heart burn  Tramadol-back pain  Ondansetron-nausea  Diphen/atropine-diarrhea     Date Last Reviewed:  6/27/2018   Number of Personal Factors/Comorbidities that affect the Plan of Care:  (age/work status, increased BMI, arhtirtis) 1-2: MODERATE COMPLEXITY   EXAMINATION:   Observation/Orthostatic Postural Assessment:          Patient ambulates independently with no significant gait deficits. Palpation:          Tender to medial joint line and inferior to patella. Tender along right IT band  ROM:            Knee AROM: left=0-115 degrees, right=02-90 degrees  Hamstrings: marked tightness bilaterally    Strength:            Knee extension: left=5/5, right=4-/5  Knee flexion: 4+/5, right=3+/5  Hip flexion: left=3+/5 with pain in back, right=3-/5  Hip adduction (supine): 3+/5 bilaterally  Hip abduction (supine): 3+/5 bilaterally    Special Tests:             Thessaly test=negative    Neurological Screen:        Myotomes:  Bilateral lower extremities are normal        Dermatomes:  Intact for light touch and sensation    Functional Mobility:         Transfers:  Mild difficulty        Bed Mobility:  Marked difficulty with bed rolling, patient unable to lay completely supine due to back pain    Balance:          Single leg stance=less than 3 seconds bilaterally     Body Structures Involved:  1. Bones  2. Joints  3. Muscles Body Functions Affected:  1. Sensory/Pain  2. Neuromusculoskeletal  3. Movement Related Activities and Participation Affected:  1. General Tasks and Demands  2. Mobility  3. Domestic Life  4. Community, Social and [place]   Number of elements (examined above) that affect the Plan of Care: 3: MODERATE COMPLEXITY   CLINICAL PRESENTATION:   Presentation: Evolving clinical presentation with changing clinical characteristics: MODERATE COMPLEXITY   CLINICAL DECISION MAKING:   Outcome Measure: Tool Used: Lower Extremity Functional Scale (LEFS)  Score:  Initial: 19/80 Most Recent: X/80 (Date: -- )   Interpretation of Score: 20 questions each scored on a 5 point scale with 0 representing \"extreme difficulty or unable to perform\" and 4 representing \"no difficulty\". The lower the score, the greater the functional disability. 80/80 represents no disability. Minimal detectable change is 9 points. Score 80 79-63 62-48 47-32 31-16 15-1 0   Modifier CH CI CJ CK CL CM CN     ? Mobility - Walking and Moving Around:     - CURRENT STATUS: CL - 60%-79% impaired, limited or restricted    - GOAL STATUS: CJ - 20%-39% impaired, limited or restricted    - D/C STATUS:  ---------------To be determined---------------    Medical Necessity:   · Patient is expected to demonstrate progress in strength, range of motion, balance, coordination and functional technique to decrease pain and increase tolerance for ADL's, weight bearing, and work duties. · Skilled intervention continues to be required due to pain and limitation in right hip and knee.   Reason for Services/Other Comments:  · Patient continues to require skilled intervention due to pain and limitation in right hip and knee. Use of outcome tool(s) and clinical judgement create a POC that gives a:  (mulitple co-morbidities, moderate to marked pain, marked limitation on outcome measure) Questionable prediction of patient's progress: MODERATE COMPLEXITY            TREATMENT:   (In addition to Assessment/Re-Assessment sessions the following treatments were rendered)  Pre-treatment Symptoms/Complaints:  Patient reports minimal pain this morning. Patient reports 3 days of good relief following manual therapy at initial evaluation. Pain: Initial:   Pain Intensity 1: 1  Pain Location 1: Leg  Pain Orientation 1: Right, Lateral  Post Session:  Patient reports 0/10 pain     Therapeutic Exercise: (40 Minutes):  Exercises per grid below to improve mobility, strength, balance and coordination. Required minimal visual, verbal and manual cues to promote proper body alignment, promote proper body posture and promote proper body mechanics. Progressed resistance, range, repetitions and complexity of movement as indicated. Date:  6/21/18 Date:  6/27/18 Date:  6-18-18   Activity/Exercise Parameters Parameters Parameters   Heel slides 2 x 10  1.5 lb 2 x 10  2x10 reps    Hip adduction  2x10, 5 sec holds Yellow ball x 10 reps x 10 sec hold    Quad stretch  30 sec, right, prone    Supine march   X 20 reps   Calf stretch Strap 3 x 30 sec [id number], incline On incline 4x30 sec hold    Hamstring stretch Strap 5 x 10 sec [id number], supine Strap 4x30 sec hold BLE's    SLR 1 x 10  1.5 lb 3 x 10 2x10, Rt X 10 reps    Ankle pumps 3 x 10 with heat  X 30 reps    I T band stretch  [id number], supine, PT assist 4x30 sec hold with strap   Nu step   Level 3, 8 min Level 4 x 8 mins    Sit to stand 2 x 5 1x10, chair with airex    Quad sets 2 x 10 with heat     Hip abduction Side lying 1.5 lbs 2 x 10 2x10, Rt    Clam shell  2x10, Rt      Manual Therapy (    Soft Tissue Mobilization Duration  Duration: 15 Minutes):  Soft tissue mobilization to right lateral thigh/IT band in sidelying to decrease pain, manual stretching of right hip flexor/quadriceps in sidelying to increase mobility  . Therapeutic Modalities: for pain and edema                                                                                               HEP: As above; handouts given to patient for all exercises. Treatment/Session Assessment:    · Response to Treatment:  Patient reported mild to moderate pain in knee with weight bearing squat movement but reported no pain/issues with all other stretches and exercises. Patient reported no pain post session. Patient is very motivated to perform self care and home exercise to manage symptoms. Added prone quad stretch to HEP. · Compliance with Program/Exercises: compliant most of the time. · Recommendations/Intent for next treatment session: \"Next visit will focus on advancements to more challenging activities\".     Total Treatment Duration: 55 minutes    PT Patient Time In/Time Out  Time In: 0800  Time Out: Pr-2 Burks By Phil Camacho, PT

## 2018-06-29 ENCOUNTER — HOSPITAL ENCOUNTER (OUTPATIENT)
Dept: PHYSICAL THERAPY | Age: 68
Discharge: HOME | End: 2018-06-29
Payer: MEDICARE

## 2018-06-29 PROCEDURE — 97110 THERAPEUTIC EXERCISES: CPT

## 2018-06-29 PROCEDURE — 97140 MANUAL THERAPY 1/> REGIONS: CPT

## 2018-06-29 NOTE — PROGRESS NOTES
Prisca Morrow  : 1950  Primary: Samuel Love Medicare Choice *  Secondary:  [facility] [address], [place], [address] [place] [address] Street  [phone]   [phone]       OUTPATIENT PHYSICAL THERAPY:Daily Note 2018    ICD-10: Treatment Diagnosis: Pain in right knee (M25.561)  Treatment diagnosis 2: Pain in right hip (M25.551)  Treatment diagnosis 3: Pain in unspecified joint (M25.50)  Precautions: *FALL RISK*  Allergies: Review of patient's allergies indicates not on file. Fall Risk Score: 6 (? 5 = High Risk)  MD Orders: evaluate and treat  MEDICAL/REFERRING DIAGNOSIS:  Pain in unspecified joint [M25.50]  Right knee pain [M25.561]   DATE OF ONSET: Pain in right hip and knee for 2-3 months  REFERRING PHYSICIAN: Jabier Caballero MD  RETURN PHYSICIAN APPOINTMENT: 18     INITIAL ASSESSMENT:  Ms. Joseph Gonzales is a 76 y.o. female presenting to physical therapy with complaints of pain in right hip and knee for the past 2-3 months. Patient reports no specific injury prior to onset of pain. Pain is greater in the knee compared to hip. Pain is increased with activity and prolonged standing. Pain is 8/10 at worst and 4/10 at best.  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 Patient reports having a fall about 1 month ago but did not seek medical treatment. 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 Patient is a good candidate for skilled physical therapy services to include manual therapy, therapeutic exercise, and pain modalities as needed. PROBLEM LIST (Impacting functional limitations):  1. Decreased Strength  2. Decreased ADL/Functional Activities  3. Decreased Ambulation Ability/Technique  4. Decreased Balance  5. Increased Pain  6. Decreased Activity Tolerance  7. Increased Fatigue  8.  Decreased Flexibility/Joint Mobility INTERVENTIONS PLANNED:  1. Balance Exercise  2. Cold  3. Cryotherapy  4. Electrical Stimulation  5. Heat  6. Home Exercise Program (HEP)  7. Manual Therapy  8. Neuromuscular Re-education/Strengthening  9. Range of Motion (ROM)  10. Therapeutic Activites  11. Therapeutic Exercise/Strengthening  12. Transcutaneous Electrical Nerve Stimulation (TENS)  13. Ultrasound (US)   TREATMENT PLAN:  Effective Dates: 6/6/2018 TO 7/7/2018 (30 days). Frequency/Duration: 2 times a week for 30 Days  GOALS: (Goals have been discussed and agreed upon with patient.)  Short-Term Functional Goals: Time Frame: 6/6/18  1. Patient will be independent with home program to increase knee range of motion. 2. Patient will report no more than 7/10 pain in right knee during work shift. Discharge Goals: Time Frame: 6/6/18 to 7/6/18  1. Patient will report no more than 5/10 pain in right knee at the end of work shift. 2. Patient will increase range of motion in right knee to 0-115 degrees. 3. Patient will increase strength in bilateral hip abduction to 4/5.  4. Patient will be able to sit with right leg crossed over left in order to put on socks/shoes. 5. Patient will perform single leg balance greater than 10 seconds bilaterally to increase stability in standing. 6. Patient will improve Lower extremity functional scale score to 48/80 from 19/80. Rehabilitation Potential For Stated Goals: Good  Regarding Mireille Saleem Becker's therapy, I certify that the treatment plan above will be carried out by a therapist or under their direction. Thank you for this referral,  Yareli Lopez PT     Referring Physician Signature: Júnior Booker MD              Date                    The information in this section was collected on 6/6/18 (except where otherwise noted). HISTORY:   History of Present Injury/Illness (Reason for Referral):  Ms. Swathi Robbins is a 76 y.o. female presenting to physical therapy with complaints of pain in right hip and knee for the past 2-3 months.   Patient reports no specific injury prior to onset of pain. Pain is greater in the knee compared to hip. Pain is increased with activity and prolonged standing. Pain is 8/10 at worst and 4/10 at best.  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 Patient reports having a fall about 1 month ago but did not seek medical treatment. Patient works part time (20 hours a week) at a [employer] shop. Past Medical History/Comorbidities:   Ms. Sade Palumbo  has no past medical history on file. Ms. Sade Palumbo  has no past surgical history on file. Social History/Living Environment:    Patient lives at home with family. Prior Level of Function/Work/Activity:  Patient works part time (20 hours per week) at [employer]. Current Medications:     No current outpatient prescriptions on file. Daralene Nguyen pressure  Levothyroxine-thyroid  Fametidine-heart burn  Tramadol-back pain  Ondansetron-nausea  Diphen/atropine-diarrhea     Date Last Reviewed:  6/29/2018   Number of Personal Factors/Comorbidities that affect the Plan of Care:  (age/work status, increased BMI, arhtirtis) 1-2: MODERATE COMPLEXITY   EXAMINATION:   Observation/Orthostatic Postural Assessment:          Patient ambulates independently with no significant gait deficits. Palpation:          Tender to medial joint line and inferior to patella. Tender along right IT band  ROM:            Knee AROM: left=0-115 degrees, right=02-90 degrees  Hamstrings: marked tightness bilaterally    Strength:            Knee extension: left=5/5, right=4-/5  Knee flexion: 4+/5, right=3+/5  Hip flexion: left=3+/5 with pain in back, right=3-/5  Hip adduction (supine): 3+/5 bilaterally  Hip abduction (supine): 3+/5 bilaterally    Special Tests:             Thessaly test=negative    Neurological Screen:        Myotomes:  Bilateral lower extremities are normal        Dermatomes:  Intact for light touch and sensation    Functional Mobility:         Transfers:  Mild difficulty        Bed Mobility:  Marked difficulty with bed rolling, patient unable to lay completely supine due to back pain    Balance:          Single leg stance=less than 3 seconds bilaterally     Body Structures Involved:  1. Bones  2. Joints  3. Muscles Body Functions Affected:  1. Sensory/Pain  2. Neuromusculoskeletal  3. Movement Related Activities and Participation Affected:  1. General Tasks and Demands  2. Mobility  3. Domestic Life  4. Community, Social and [place]   Number of elements (examined above) that affect the Plan of Care: 3: MODERATE COMPLEXITY   CLINICAL PRESENTATION:   Presentation: Evolving clinical presentation with changing clinical characteristics: MODERATE COMPLEXITY   CLINICAL DECISION MAKING:   Outcome Measure: Tool Used: Lower Extremity Functional Scale (LEFS)  Score:  Initial: 19/80 Most Recent: X/80 (Date: -- )   Interpretation of Score: 20 questions each scored on a 5 point scale with 0 representing \"extreme difficulty or unable to perform\" and 4 representing \"no difficulty\". The lower the score, the greater the functional disability. 80/80 represents no disability. Minimal detectable change is 9 points. Score 80 79-63 62-48 47-32 31-16 15-1 0   Modifier CH CI CJ CK CL CM CN     ? Mobility - Walking and Moving Around:     - CURRENT STATUS: CL - 60%-79% impaired, limited or restricted    - GOAL STATUS: CJ - 20%-39% impaired, limited or restricted    - D/C STATUS:  ---------------To be determined---------------    Medical Necessity:   · Patient is expected to demonstrate progress in strength, range of motion, balance, coordination and functional technique to decrease pain and increase tolerance for ADL's, weight bearing, and work duties. · Skilled intervention continues to be required due to pain and limitation in right hip and knee.   Reason for Services/Other Comments:  · Patient continues to require skilled intervention due to pain and limitation in right hip and knee. Use of outcome tool(s) and clinical judgement create a POC that gives a:  (mulitple co-morbidities, moderate to marked pain, marked limitation on outcome measure) Questionable prediction of patient's progress: MODERATE COMPLEXITY            TREATMENT:   (In addition to Assessment/Re-Assessment sessions the following treatments were rendered)  Pre-treatment Symptoms/Complaints:  Patient reports no pain this morning, and reports good relief for 2 days after last session. Pain: Initial:   Pain Intensity 1: 0  Pain Location 1: Knee, Leg  Pain Orientation 1: Right, Lateral  Post Session:  Patient reports 0/10 pain     Therapeutic Exercise: (40 Minutes):  Exercises per grid below to improve mobility, strength, balance and coordination. Required minimal visual, verbal and manual cues to promote proper body alignment, promote proper body posture and promote proper body mechanics. Progressed resistance, range, repetitions and complexity of movement as indicated. Date:  6/21/18 Date:  6/27/18 Date:  6/29/18   Activity/Exercise Parameters Parameters Parameters   Heel slides 2 x 10  1.5 lb 2 x 10  [id number]   Hip adduction  2x10, 5 sec holds 2 min, 5 sec holds,ball squeeze   Quad stretch  30 sec, right, prone    Supine march      Calf stretch Strap 3 x 30 sec [id number], incline [id number], incline   Hamstring stretch Strap 5 x 10 sec [id number], supine [id number], supine   SLR 1 x 10  1.5 lb 3 x 10 2x10, Rt 2x10, right   Ankle pumps 3 x 10 with heat     I T band stretch  [id number], supine, PT assist [id number], PT assist   Nu step   Level 3, 8 min Level 3, 11 min   Sit to stand 2 x 5 1x10, chair with airex    Quad sets 2 x 10 with heat     Hip abduction Side lying 1.5 lbs 2 x 10 2x10, Rt 2x10, right   Clam shell  2x10, Rt 2 min, per side     Manual Therapy (    Soft Tissue Mobilization Duration  Duration: 15 Minutes):  Soft tissue mobilization to right lateral thigh/IT band in sidelying to decrease pain, manual stretching of right hip flexor/quadriceps in sidelying to increase mobility  . Therapeutic Modalities: for pain and edema                                                                                               HEP: As above; handouts given to patient for all exercises. Treatment/Session Assessment:    · Response to Treatment:  Patient continues to tolerate all treatments and exercises with no complaints and reports no pain post session. · Compliance with Program/Exercises: compliant most of the time. · Recommendations/Intent for next treatment session: \"Next visit will focus on advancements to more challenging activities\".     Total Treatment Duration: 55 minutes    PT Patient Time In/Time Out  Time In: 0800  Time Out: Pr-2 Burks By Phil Camacho PT

## 2018-07-03 ENCOUNTER — HOSPITAL ENCOUNTER (OUTPATIENT)
Dept: PHYSICAL THERAPY | Age: 68
Discharge: HOME | End: 2018-07-03

## 2018-07-03 NOTE — PROGRESS NOTES
Patient could not make available times this week. Patient was called and offered open spots.       Yareli Lopez, PT, CLARKT

## 2018-07-05 ENCOUNTER — HOSPITAL ENCOUNTER (OUTPATIENT)
Dept: PHYSICAL THERAPY | Age: 68
Discharge: HOME | End: 2018-07-05

## 2018-07-09 NOTE — PROGRESS NOTES
Patient cancelled all of appointments and reports that her knee pain has worsened.   Patient to follow up with ortho MD.    Cyndi Chester, PT, DPT

## 2018-07-10 ENCOUNTER — APPOINTMENT (OUTPATIENT)
Dept: PHYSICAL THERAPY | Age: 68
End: 2018-07-10

## 2018-07-10 NOTE — THERAPY DISCHARGE
Stefan Doyle  : 1950  Primary: Carlos Carver Medicare Choice *  Secondary:  Therapy Center at [address], [place], [address] [place] [address] Street  [phone]   [phone]       OUTPATIENT PHYSICAL THERAPY:Discontinuation Summary 7/10/18    ICD-10: Treatment Diagnosis: Pain in right knee (M25.561)  Treatment diagnosis 2: Pain in right hip (M25.551)  Treatment diagnosis 3: Pain in unspecified joint (M25.50)  Precautions: *FALL RISK*  Allergies: Review of patient's allergies indicates not on file. Fall Risk Score: 6 (? 5 = High Risk)  MD Orders: evaluate and treat  MEDICAL/REFERRING DIAGNOSIS:  Pain in unspecified joint [M25.50]  Right knee pain [M25.561]   DATE OF ONSET: Pain in right hip and knee for 2-3 months  REFERRING PHYSICIAN: Marco Nunes MD  RETURN PHYSICIAN APPOINTMENT: 18     INITIAL ASSESSMENT:  Ms. Howard Laguna is a 76 y.o. female presenting to physical therapy with complaints of pain in right hip and knee for the past 2-3 months. Patient reports no specific injury prior to onset of pain. Pain is greater in the knee compared to hip. Pain is increased with activity and prolonged standing. Pain is 8/10 at worst and 4/10 at best.  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 Patient reports having a fall about 1 month ago but did not seek medical treatment. 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 Stefan Doyle has been seen in physical therapy from 18 to 18 for 6 visits. Treatment has been discontinued at this time due to patient request to end therapy. Patient was making moderate progress towards goals at last visit. Patient reported good relief after therapy sessions that lasted for a few days. Patient reports that pain returned, however, during prolonged standing at work.   Patient contacted clinic to report that she would like to discontinue therapy and was going to follow up with MD regarding continued pain. Patient to be discharged at this time. Thank you for this referral.        PROBLEM LIST (Impacting functional limitations):  1. Decreased Strength  2. Decreased ADL/Functional Activities  3. Decreased Ambulation Ability/Technique  4. Decreased Balance  5. Increased Pain  6. Decreased Activity Tolerance  7. Increased Fatigue  8. Decreased Flexibility/Joint Mobility INTERVENTIONS PLANNED:  1. Balance Exercise  2. Cold  3. Cryotherapy  4. Electrical Stimulation  5. Heat  6. Home Exercise Program (HEP)  7. Manual Therapy  8. Neuromuscular Re-education/Strengthening  9. Range of Motion (ROM)  10. Therapeutic Activites  11. Therapeutic Exercise/Strengthening  12. Transcutaneous Electrical Nerve Stimulation (TENS)  13. Ultrasound (US)   TREATMENT PLAN:  Effective Dates: 6/6/2018 TO 7/7/2018 (30 days). Frequency/Duration: Patient to be discharged. GOALS: (Goals have been discussed and agreed upon with patient.)  Short-Term Functional Goals: Time Frame: 6/6/18  1. Patient will be independent with home program to increase knee range of motion. -met  2. Patient will report no more than 7/10 pain in right knee during work shift.-not met  Discharge Goals: Time Frame: 6/6/18 to 7/6/18  1. Patient will report no more than 5/10 pain in right knee at the end of work shift.-not met  2. Patient will increase range of motion in right knee to 0-115 degrees. -not met  3. Patient will increase strength in bilateral hip abduction to 4/5.-not met  4. Patient will be able to sit with right leg crossed over left in order to put on socks/shoes. -not met  5. Patient will perform single leg balance greater than 10 seconds bilaterally to increase stability in standing.-not met  6. Patient will improve Lower extremity functional scale score to 48/80 from 19/80. -not met  Rehabilitation Potential For Stated Goals: Good  Regarding Kathyanne Lundborg Thurston's therapy, I certify that the treatment plan above will be carried out by a therapist or under their direction.   Thank you for this referral,  Anjel Mancera, PT

## 2018-07-13 ENCOUNTER — APPOINTMENT (OUTPATIENT)
Dept: PHYSICAL THERAPY | Age: 68
End: 2018-07-13

## 2020-09-17 ENCOUNTER — HOSPITAL ENCOUNTER (OUTPATIENT)
Dept: MAMMOGRAPHY | Age: 70
Discharge: HOME | End: 2020-09-17
Attending: NURSE PRACTITIONER
Payer: COMMERCIAL

## 2020-09-17 VITALS — SYSTOLIC BLOOD PRESSURE: 135 MMHG | HEART RATE: 83 BPM | DIASTOLIC BLOOD PRESSURE: 63 MMHG

## 2020-09-17 DIAGNOSIS — N63.10 MASS OF RIGHT BREAST: ICD-10-CM

## 2020-09-17 DIAGNOSIS — R92.8 ABNORMAL FINDING ON BREAST IMAGING: ICD-10-CM

## 2020-09-17 DIAGNOSIS — N63.10 BREAST MASS, RIGHT: ICD-10-CM

## 2020-09-17 PROCEDURE — 77065 DX MAMMO INCL CAD UNI: CPT

## 2020-09-17 PROCEDURE — 19083 BX BREAST 1ST LESION US IMAG: CPT

## 2020-09-17 PROCEDURE — 74011000250 HC RX REV CODE- 250

## 2020-09-17 PROCEDURE — 88305 TISSUE EXAM BY PATHOLOGIST: CPT

## 2020-09-17 RX ORDER — LIDOCAINE HYDROCHLORIDE 10 MG/ML
4 INJECTION INFILTRATION; PERINEURAL
Status: COMPLETED | OUTPATIENT
Start: 2020-09-17 | End: 2020-09-17

## 2020-09-17 RX ADMIN — LIDOCAINE HYDROCHLORIDE 4 ML: 10 INJECTION, SOLUTION INFILTRATION; PERINEURAL at 10:15

## 2020-09-21 NOTE — PROGRESS NOTES
The patient returned to the breast center today to discuss the results of her recent right breast biopsy, pathology came back as IDC. Dr. Sukh Gomez and I discussed the results as well as the next steps, she will see Dr. Isael Mccabe on 9-22-20 at 10am. The patient did not need an MRI. The patient was given a new breast cancer patient packet of information that includes some educational materials as well as her appointment to see Dr. Isael Mccabe. She was given our contact information in case she has any further questions.

## 2020-09-22 NOTE — H&P (VIEW-ONLY)
Abel Allen MD, [address] [facility], [address] [place] Phone [phone]   Fax [phone] Date of visit: 9/22/2020 Primary/Requesting provider: Ladonna Huizar NP Chief Complaint Patient presents with  New Patient  
  r breast IDC HISTORY OF PRESENT ILLNESS Junior Rahman is a 79 y.o. female. HPI Patient is a 79 y.o. female who presents for discussion of treatments for her recently diagnosed right breast cancer. She is s/p breast conserving surgery in approxi 2001 at [facility] for cancer on the left. She reports having surgery, then chemo x 6 months (Dr Prisca Madrid) then radiation. She did not take tamoxifen/arimidex after. She had two episodes of post-op breast infection, the first shortly after surgery requiring drainage and packing, and the second treated with antibiotics during XRT. She has had no further left breast concerns. The current lesion was noted on the right side as an incidental finding on routine mammogram.  She denies any right-sided breast symptoms. Pt is concerned about strong family history of cancer, identifying numerous 1st-3rd degree relatives with various cancers. This included a paternal cousin with ovarian cancer, and many 1st/2nd degree relatives with prostate or colorectal cancer. She reports having been identified as [place] risk for cancer\" by a genetic test which she states was done by [facility] (no documentation in EMR). Medications:  
Current Outpatient Medications Medication Sig  
 famotidine (PEPCID) 20 mg tablet TK 1 T PO QD  
 gabapentin (NEURONTIN) 100 mg capsule Take 100 mg by mouth.  hydroCHLOROthiazide (HYDRODIURIL) 25 mg tablet TK 1 T PO D  
 levothyroxine (SYNTHROID) 125 mcg tablet Take 125 mcg by mouth daily.  quinapriL (ACCUPRIL) 20 mg tablet TK 1 T PO QD  
 rosuvastatin (CRESTOR) 10 mg tablet  traMADoL (ULTRAM) 50 mg tablet Take 50 mg by mouth. No current facility-administered medications for this visit. Allergies: No Known Allergies Past History: 
Past Medical History:  
Diagnosis Date  Chronic kidney disease   
 stage 3 kidney failure  Hypercholesterolemia  Hypertension  Thyroid disease Past Surgical History:  
Procedure Laterality Date  HX BREAST BIOPSY Right 2020 U/S  
 HX  SECTION    
 ,  Family and Social History: No family history on file. Social History Socioeconomic History  Marital status: LEGALLY  Spouse name: Not on file  Number of children: Not on file  Years of education: Not on file  Highest education level: Not on file Occupational History  Not on file Social Needs  Financial resource strain: Not on file  Food insecurity Worry: Not on file Inability: Not on file  Transportation needs Medical: Not on file Non-medical: Not on file Tobacco Use  Smoking status: Never Smoker  Smokeless tobacco: Never Used Substance and Sexual Activity  Alcohol use: Not on file  Drug use: Not on file  Sexual activity: Not on file Lifestyle  Physical activity Days per week: Not on file Minutes per session: Not on file  Stress: Not on file Relationships  Social connections Talks on phone: Not on file Gets together: Not on file Attends Jainism service: Not on file Active member of club or organization: Not on file Attends meetings of clubs or organizations: Not on file Relationship status: Not on file  Intimate partner violence Fear of current or ex partner: Not on file Emotionally abused: Not on file Physically abused: Not on file Forced sexual activity: Not on file Other Topics Concern  Not on file Social History Narrative  Not on file Review of Systems Constitutional: Negative. HENT: Negative. Eyes: Negative. Respiratory: Negative. Cardiovascular: Negative. Gastrointestinal: Negative. Genitourinary: Negative. Musculoskeletal: Negative. Skin:  
     Right breast IDC Neurological: Negative. Endo/Heme/Allergies: Negative. Psychiatric/Behavioral: Negative. Physical Exam 
Vitals signs and nursing note reviewed. Exam conducted with a chaperone present. Constitutional:   
   Appearance: She is well-developed. She is obese. She is not toxic-appearing or diaphoretic. HENT:  
   Head: Normocephalic and atraumatic. Eyes:  
   General: No scleral icterus. Conjunctiva/sclera: Conjunctivae normal.  
   Pupils: Pupils are equal, round, and reactive to light. Neck: Musculoskeletal: Normal range of motion. Thyroid: No thyromegaly. Vascular: No JVD. Trachea: No tracheal deviation. Cardiovascular:  
   Rate and Rhythm: Normal rate and regular rhythm. Heart sounds: No murmur. No friction rub. No gallop. Pulmonary:  
   Effort: Pulmonary effort is normal. No respiratory distress. Breath sounds: Normal breath sounds. No wheezing or rales. Chest:  
 
 
Abdominal:  
   General: There is no distension. Palpations: Abdomen is soft. Tenderness: There is no abdominal tenderness. Musculoskeletal:  
   Comments: No gross deformities Lymphadenopathy:  
   Upper Body:  
   Right upper body: No axillary or pectoral adenopathy. Left upper body: No axillary or pectoral adenopathy. Skin: 
   General: Skin is warm. Neurological:  
   General: No focal deficit present. Mental Status: She is alert. Cranial Nerves: No cranial nerve deficit. Psychiatric:     
   Mood and Affect: Mood normal.     
   Behavior: Behavior normal. Behavior is cooperative. Thought Content:  Thought content normal.     
   Judgment: Judgment normal.  
 
 
DATA: 
Images reviewed revealing 1cm medial lesion, middle depth, right breast 
 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 ASSESSMENT and PLAN Encounter Diagnoses Name Primary?  History of right breast cancer Yes Discussed pathology in detail with pt. ER status importance also reviewed and we discussed how this may modify her post-surgical management including treatment with anti-estrogen agents and the need to stop any hormonal supplementation she may be taking. Suspect her initial tumor was ER (-), thus current cancer also likely negative Discussed family history as it may relate to any value to investigation of a genetic basis for her cancer. I cannot locate any documentation of any genetic testing and am unaware of any testing that would determine a generic high cancer risk. Current cancer treatment would only be altered by a known BRCA mutation. Discussed management options. Initial treatment should be surgical. Reviewed total vs. Partial mastectomy (w/ XRT) including different local recurrence rates but equivalent long term survival rates. Discussed potential indications for post-mastectomy XRT as well. We also discussed bilateral mastectomy; she reports her family is encouraging this but she is not comfortable with the degree of invasiveness. After discussion, we will proceed with right wire localized partial mastectomy and sentinel lymph node biopsy. We have discussed the technical details of the procedure(s) in detail. Risks reviewed include anesthetic risks, bleeding, infection, wound healing problems and cosmetic abnormalities, need for further surgical intervention depending on pathology reports, lymphedema if axillary procedure planned, and recurrence. All questions are answered.

## 2020-09-28 ENCOUNTER — HOSPITAL ENCOUNTER (OUTPATIENT)
Dept: SURGERY | Age: 70
Discharge: HOME | End: 2020-09-28

## 2020-09-30 VITALS — HEIGHT: 65 IN | WEIGHT: 230 LBS | BODY MASS INDEX: 38.32 KG/M2

## 2020-09-30 RX ORDER — CHOLECALCIFEROL (VITAMIN D3) 125 MCG
2000 CAPSULE ORAL
COMMUNITY
End: 2021-01-14 | Stop reason: ALTCHOICE

## 2020-09-30 RX ORDER — CALCIUM CARBONATE/VITAMIN D3 600 MG-125
1 TABLET ORAL
COMMUNITY

## 2020-09-30 RX ORDER — LANOLIN ALCOHOL/MO/W.PET/CERES
1 CREAM (GRAM) TOPICAL
COMMUNITY
End: 2020-10-26 | Stop reason: CLARIF

## 2020-09-30 NOTE — PERIOP NOTES
Patient verified name and . Order for consent yes found in EHR and matches case posting; patient verifies procedure. Type 1b surgery, phone assessment complete. Orders received. Labs per surgeon: none  Labs per anesthesia protocol: potassium to be drawn at outpt lab      Patient answered medical/surgical history questions at their best of ability. All prior to admission medications documented in [facility]. Patient instructed to take the following medications the day of surgery according to anesthesia guidelines with a small sip of water Levothyroxine and Pepcid: Hold all vitamins 7 days prior to surgery and NSAIDS 5 days prior to surgery. Prescription meds to hold:none    Patient instructed on the following:    > a negative Covid swab result is required to proceed with surgery;  appointments are made by the surgeon office and test should be collected 7 days prior to surgery. The testing center is located at the [address] [place] [address].   > 1 visitor allowed at this time. >Arrive at [facility], time of arrival to be called the day before by 1700  >NPO after midnight including gum, mints, and ice chips  >Responsible adult must drive patient to the hospital, stay during surgery, and patient will need supervision 24 hours after anesthesia  >Use antibacterial soap in shower the night before surgery and on the morning of surgery  >All piercings must be removed prior to arrival.    >Leave all valuables (money and jewelry) at home but bring insurance card and ID on       DOS. >Do not wear make-up, nail polish, lotions, cologne, perfumes, powders, or oil on skin.

## 2020-10-01 ENCOUNTER — HOSPITAL ENCOUNTER (OUTPATIENT)
Dept: LAB | Age: 70
Discharge: HOME | End: 2020-10-01
Attending: SURGERY
Payer: COMMERCIAL

## 2020-10-01 LAB — POTASSIUM SERPL-SCNC: 4.3 MMOL/L (ref 3.5–5.1)

## 2020-10-01 PROCEDURE — 84132 ASSAY OF SERUM POTASSIUM: CPT

## 2020-10-01 PROCEDURE — 36415 COLL VENOUS BLD VENIPUNCTURE: CPT

## 2020-10-02 NOTE — PERIOP NOTES
Lab results within limits per anesthesia protocol; OK for surgery.      Recent Results (from the past 24 hour(s))   POTASSIUM    Collection Time: 10/01/20  1:12 PM   Result Value Ref Range    Potassium 4.3 3.5 - 5.1 mmol/L

## 2020-10-04 ENCOUNTER — ANESTHESIA EVENT (OUTPATIENT)
Dept: SURGERY | Age: 70
End: 2020-10-04
Payer: COMMERCIAL

## 2020-10-05 ENCOUNTER — APPOINTMENT (OUTPATIENT)
Dept: MAMMOGRAPHY | Age: 70
End: 2020-10-05
Attending: SURGERY
Payer: COMMERCIAL

## 2020-10-05 ENCOUNTER — HOSPITAL ENCOUNTER (OUTPATIENT)
Age: 70
Setting detail: OUTPATIENT SURGERY
Discharge: HOME | End: 2020-10-05
Attending: SURGERY | Admitting: SURGERY
Payer: COMMERCIAL

## 2020-10-05 ENCOUNTER — APPOINTMENT (OUTPATIENT)
Dept: NUCLEAR MEDICINE | Age: 70
End: 2020-10-05
Attending: SURGERY
Payer: COMMERCIAL

## 2020-10-05 ENCOUNTER — ANESTHESIA (OUTPATIENT)
Dept: SURGERY | Age: 70
End: 2020-10-05
Payer: COMMERCIAL

## 2020-10-05 VITALS
OXYGEN SATURATION: 97 % | DIASTOLIC BLOOD PRESSURE: 72 MMHG | BODY MASS INDEX: 39.49 KG/M2 | TEMPERATURE: 98.2 F | HEART RATE: 74 BPM | SYSTOLIC BLOOD PRESSURE: 160 MMHG | RESPIRATION RATE: 16 BRPM | HEIGHT: 65 IN | WEIGHT: 237 LBS

## 2020-10-05 DIAGNOSIS — Z85.3 HISTORY OF CANCER OF LEFT BREAST: ICD-10-CM

## 2020-10-05 DIAGNOSIS — C50.911 MALIGNANT NEOPLASM OF RIGHT BREAST IN FEMALE, ESTROGEN RECEPTOR NEGATIVE, UNSPECIFIED SITE OF BREAST (HCC): Primary | ICD-10-CM

## 2020-10-05 DIAGNOSIS — Z17.1 MALIGNANT NEOPLASM OF RIGHT BREAST IN FEMALE, ESTROGEN RECEPTOR NEGATIVE, UNSPECIFIED SITE OF BREAST (HCC): Primary | ICD-10-CM

## 2020-10-05 PROCEDURE — 77030002996 HC SUT SLK J&J -A: Performed by: SURGERY

## 2020-10-05 PROCEDURE — 74011000250 HC RX REV CODE- 250: Performed by: ANESTHESIOLOGY

## 2020-10-05 PROCEDURE — 74011000250 HC RX REV CODE- 250: Performed by: SURGERY

## 2020-10-05 PROCEDURE — 76210000006 HC OR PH I REC 0.5 TO 1 HR: Performed by: SURGERY

## 2020-10-05 PROCEDURE — 77065 DX MAMMO INCL CAD UNI: CPT

## 2020-10-05 PROCEDURE — 76060000033 HC ANESTHESIA 1 TO 1.5 HR: Performed by: SURGERY

## 2020-10-05 PROCEDURE — 77030010512 HC APPL CLP LIG J&J -C: Performed by: SURGERY

## 2020-10-05 PROCEDURE — 77030040361 HC SLV COMPR DVT MDII -B: Performed by: SURGERY

## 2020-10-05 PROCEDURE — 74011250636 HC RX REV CODE- 250/636: Performed by: ANESTHESIOLOGY

## 2020-10-05 PROCEDURE — A9541 TC99M SULFUR COLLOID: HCPCS

## 2020-10-05 PROCEDURE — 2709999900 HC NON-CHARGEABLE SUPPLY: Performed by: SURGERY

## 2020-10-05 PROCEDURE — 74011000636 HC RX REV CODE- 636: Performed by: SURGERY

## 2020-10-05 PROCEDURE — 76010000161 HC OR TIME 1 TO 1.5 HR INTENSV-TIER 1: Performed by: SURGERY

## 2020-10-05 PROCEDURE — 77030003460 US PLC NDL/WIRE/CLIP/SEED BREAST RT

## 2020-10-05 PROCEDURE — 74011250636 HC RX REV CODE- 250/636: Performed by: SURGERY

## 2020-10-05 PROCEDURE — 38900 IO MAP OF SENT LYMPH NODE: CPT | Performed by: SURGERY

## 2020-10-05 PROCEDURE — 74011250637 HC RX REV CODE- 250/637: Performed by: ANESTHESIOLOGY

## 2020-10-05 PROCEDURE — 88305 TISSUE EXAM BY PATHOLOGIST: CPT

## 2020-10-05 PROCEDURE — 38525 BIOPSY/REMOVAL LYMPH NODES: CPT | Performed by: SURGERY

## 2020-10-05 PROCEDURE — 74011000250 HC RX REV CODE- 250: Performed by: NURSE ANESTHETIST, CERTIFIED REGISTERED

## 2020-10-05 PROCEDURE — 19301 PARTIAL MASTECTOMY: CPT | Performed by: SURGERY

## 2020-10-05 PROCEDURE — 88307 TISSUE EXAM BY PATHOLOGIST: CPT

## 2020-10-05 PROCEDURE — 77030010509 HC AIRWY LMA MSK TELE -A: Performed by: ANESTHESIOLOGY

## 2020-10-05 PROCEDURE — 76210000020 HC REC RM PH II FIRST 0.5 HR: Performed by: SURGERY

## 2020-10-05 PROCEDURE — 74011250636 HC RX REV CODE- 250/636: Performed by: NURSE ANESTHETIST, CERTIFIED REGISTERED

## 2020-10-05 PROCEDURE — 77030031139 HC SUT VCRL2 J&J -A: Performed by: SURGERY

## 2020-10-05 RX ORDER — ISOSULFAN BLUE 50 MG/5ML
INJECTION, SOLUTION SUBCUTANEOUS AS NEEDED
Status: DISCONTINUED | OUTPATIENT
Start: 2020-10-05 | End: 2020-10-05 | Stop reason: HOSPADM

## 2020-10-05 RX ORDER — CEFAZOLIN SODIUM 1 G/3ML
2-3 INJECTION, POWDER, FOR SOLUTION INTRAMUSCULAR; INTRAVENOUS
Status: DISCONTINUED | OUTPATIENT
Start: 2020-10-05 | End: 2020-10-05 | Stop reason: SDUPTHER

## 2020-10-05 RX ORDER — ALBUTEROL SULFATE 0.83 MG/ML
2.5 SOLUTION RESPIRATORY (INHALATION) AS NEEDED
Status: DISCONTINUED | OUTPATIENT
Start: 2020-10-05 | End: 2020-10-05 | Stop reason: HOSPADM

## 2020-10-05 RX ORDER — OXYCODONE HYDROCHLORIDE 5 MG/1
5 TABLET ORAL
Status: COMPLETED | OUTPATIENT
Start: 2020-10-05 | End: 2020-10-05

## 2020-10-05 RX ORDER — NALOXONE HYDROCHLORIDE 0.4 MG/ML
0.1 INJECTION, SOLUTION INTRAMUSCULAR; INTRAVENOUS; SUBCUTANEOUS AS NEEDED
Status: DISCONTINUED | OUTPATIENT
Start: 2020-10-05 | End: 2020-10-05 | Stop reason: HOSPADM

## 2020-10-05 RX ORDER — ONDANSETRON 2 MG/ML
4 INJECTION INTRAMUSCULAR; INTRAVENOUS ONCE
Status: DISCONTINUED | OUTPATIENT
Start: 2020-10-05 | End: 2020-10-05 | Stop reason: HOSPADM

## 2020-10-05 RX ORDER — HYDROCODONE BITARTRATE AND ACETAMINOPHEN 5; 325 MG/1; MG/1
TABLET ORAL
Qty: 20 TAB | Refills: 0 | Status: SHIPPED | OUTPATIENT
Start: 2020-10-05 | End: 2020-10-12

## 2020-10-05 RX ORDER — SCOLOPAMINE TRANSDERMAL SYSTEM 1 MG/1
1 PATCH, EXTENDED RELEASE TRANSDERMAL ONCE
Status: DISCONTINUED | OUTPATIENT
Start: 2020-10-05 | End: 2020-10-05 | Stop reason: HOSPADM

## 2020-10-05 RX ORDER — DIPHENHYDRAMINE HYDROCHLORIDE 50 MG/ML
12.5 INJECTION, SOLUTION INTRAMUSCULAR; INTRAVENOUS
Status: DISCONTINUED | OUTPATIENT
Start: 2020-10-05 | End: 2020-10-05 | Stop reason: HOSPADM

## 2020-10-05 RX ORDER — CEFAZOLIN SODIUM/WATER 2 G/20 ML
2 SYRINGE (ML) INTRAVENOUS ONCE
Status: COMPLETED | OUTPATIENT
Start: 2020-10-05 | End: 2020-10-05

## 2020-10-05 RX ORDER — MIDAZOLAM HYDROCHLORIDE 1 MG/ML
2 INJECTION, SOLUTION INTRAMUSCULAR; INTRAVENOUS ONCE
Status: DISCONTINUED | OUTPATIENT
Start: 2020-10-05 | End: 2020-10-05 | Stop reason: HOSPADM

## 2020-10-05 RX ORDER — FENTANYL CITRATE 50 UG/ML
INJECTION, SOLUTION INTRAMUSCULAR; INTRAVENOUS AS NEEDED
Status: DISCONTINUED | OUTPATIENT
Start: 2020-10-05 | End: 2020-10-05 | Stop reason: HOSPADM

## 2020-10-05 RX ORDER — MIDAZOLAM HYDROCHLORIDE 1 MG/ML
2 INJECTION, SOLUTION INTRAMUSCULAR; INTRAVENOUS
Status: DISCONTINUED | OUTPATIENT
Start: 2020-10-05 | End: 2020-10-05 | Stop reason: HOSPADM

## 2020-10-05 RX ORDER — LIDOCAINE HYDROCHLORIDE AND EPINEPHRINE 10; 10 MG/ML; UG/ML
INJECTION, SOLUTION INFILTRATION; PERINEURAL AS NEEDED
Status: DISCONTINUED | OUTPATIENT
Start: 2020-10-05 | End: 2020-10-05 | Stop reason: HOSPADM

## 2020-10-05 RX ORDER — HYDROCODONE BITARTRATE AND ACETAMINOPHEN 5; 325 MG/1; MG/1
TABLET ORAL
Qty: 20 TAB | Refills: 0 | Status: SHIPPED
Start: 2020-10-05 | End: 2020-10-12

## 2020-10-05 RX ORDER — LIDOCAINE HYDROCHLORIDE 20 MG/ML
INJECTION, SOLUTION EPIDURAL; INFILTRATION; INTRACAUDAL; PERINEURAL AS NEEDED
Status: DISCONTINUED | OUTPATIENT
Start: 2020-10-05 | End: 2020-10-05 | Stop reason: HOSPADM

## 2020-10-05 RX ORDER — PROPOFOL 10 MG/ML
INJECTION, EMULSION INTRAVENOUS AS NEEDED
Status: DISCONTINUED | OUTPATIENT
Start: 2020-10-05 | End: 2020-10-05 | Stop reason: HOSPADM

## 2020-10-05 RX ORDER — OXYCODONE HYDROCHLORIDE 5 MG/1
10 TABLET ORAL
Status: DISCONTINUED | OUTPATIENT
Start: 2020-10-05 | End: 2020-10-05 | Stop reason: HOSPADM

## 2020-10-05 RX ORDER — ONDANSETRON 2 MG/ML
INJECTION INTRAMUSCULAR; INTRAVENOUS AS NEEDED
Status: DISCONTINUED | OUTPATIENT
Start: 2020-10-05 | End: 2020-10-05 | Stop reason: HOSPADM

## 2020-10-05 RX ORDER — FENTANYL CITRATE 50 UG/ML
100 INJECTION, SOLUTION INTRAMUSCULAR; INTRAVENOUS ONCE
Status: DISCONTINUED | OUTPATIENT
Start: 2020-10-05 | End: 2020-10-05 | Stop reason: HOSPADM

## 2020-10-05 RX ORDER — LIDOCAINE HYDROCHLORIDE 20 MG/ML
5 INJECTION, SOLUTION INFILTRATION; PERINEURAL
Status: COMPLETED | OUTPATIENT
Start: 2020-10-05 | End: 2020-10-05

## 2020-10-05 RX ORDER — KETOROLAC TROMETHAMINE 30 MG/ML
INJECTION, SOLUTION INTRAMUSCULAR; INTRAVENOUS AS NEEDED
Status: DISCONTINUED | OUTPATIENT
Start: 2020-10-05 | End: 2020-10-05 | Stop reason: HOSPADM

## 2020-10-05 RX ORDER — LIDOCAINE HYDROCHLORIDE 10 MG/ML
0.1 INJECTION INFILTRATION; PERINEURAL AS NEEDED
Status: DISCONTINUED | OUTPATIENT
Start: 2020-10-05 | End: 2020-10-05 | Stop reason: HOSPADM

## 2020-10-05 RX ORDER — SODIUM CHLORIDE, SODIUM LACTATE, POTASSIUM CHLORIDE, CALCIUM CHLORIDE 600; 310; 30; 20 MG/100ML; MG/100ML; MG/100ML; MG/100ML
100 INJECTION, SOLUTION INTRAVENOUS CONTINUOUS
Status: DISCONTINUED | OUTPATIENT
Start: 2020-10-05 | End: 2020-10-05 | Stop reason: HOSPADM

## 2020-10-05 RX ORDER — HYDROMORPHONE HYDROCHLORIDE 2 MG/ML
0.5 INJECTION, SOLUTION INTRAMUSCULAR; INTRAVENOUS; SUBCUTANEOUS
Status: DISCONTINUED | OUTPATIENT
Start: 2020-10-05 | End: 2020-10-05 | Stop reason: HOSPADM

## 2020-10-05 RX ORDER — DEXAMETHASONE SODIUM PHOSPHATE 4 MG/ML
INJECTION, SOLUTION INTRA-ARTICULAR; INTRALESIONAL; INTRAMUSCULAR; INTRAVENOUS; SOFT TISSUE AS NEEDED
Status: DISCONTINUED | OUTPATIENT
Start: 2020-10-05 | End: 2020-10-05 | Stop reason: HOSPADM

## 2020-10-05 RX ADMIN — SODIUM CHLORIDE, SODIUM LACTATE, POTASSIUM CHLORIDE, AND CALCIUM CHLORIDE 100 ML/HR: 600; 310; 30; 20 INJECTION, SOLUTION INTRAVENOUS at 07:32

## 2020-10-05 RX ADMIN — ONDANSETRON 4 MG: 2 INJECTION INTRAMUSCULAR; INTRAVENOUS at 12:31

## 2020-10-05 RX ADMIN — KETOROLAC TROMETHAMINE 30 MG: 30 INJECTION, SOLUTION INTRAMUSCULAR; INTRAVENOUS at 13:12

## 2020-10-05 RX ADMIN — LIDOCAINE HYDROCHLORIDE 100 MG: 20 INJECTION, SOLUTION INFILTRATION; PERINEURAL at 10:00

## 2020-10-05 RX ADMIN — FENTANYL CITRATE 25 MCG: 50 INJECTION INTRAMUSCULAR; INTRAVENOUS at 12:56

## 2020-10-05 RX ADMIN — FENTANYL CITRATE 25 MCG: 50 INJECTION INTRAMUSCULAR; INTRAVENOUS at 12:10

## 2020-10-05 RX ADMIN — LIDOCAINE HYDROCHLORIDE 100 MG: 20 INJECTION, SOLUTION EPIDURAL; INFILTRATION; INTRACAUDAL; PERINEURAL at 12:02

## 2020-10-05 RX ADMIN — FENTANYL CITRATE 25 MCG: 50 INJECTION INTRAMUSCULAR; INTRAVENOUS at 12:06

## 2020-10-05 RX ADMIN — LIDOCAINE HYDROCHLORIDE 0.1 ML: 10 INJECTION, SOLUTION INFILTRATION; PERINEURAL at 07:31

## 2020-10-05 RX ADMIN — OXYCODONE 5 MG: 5 TABLET ORAL at 13:42

## 2020-10-05 RX ADMIN — PROPOFOL 150 MG: 10 INJECTION, EMULSION INTRAVENOUS at 12:02

## 2020-10-05 RX ADMIN — Medication 2 G: at 12:04

## 2020-10-05 RX ADMIN — DEXAMETHASONE SODIUM PHOSPHATE 10 MG: 4 INJECTION, SOLUTION INTRAMUSCULAR; INTRAVENOUS at 12:31

## 2020-10-05 RX ADMIN — SODIUM CHLORIDE, SODIUM LACTATE, POTASSIUM CHLORIDE, AND CALCIUM CHLORIDE: 600; 310; 30; 20 INJECTION, SOLUTION INTRAVENOUS at 13:02

## 2020-10-05 RX ADMIN — FENTANYL CITRATE 25 MCG: 50 INJECTION INTRAMUSCULAR; INTRAVENOUS at 12:32

## 2020-10-05 NOTE — INTERVAL H&P NOTE
Update History & Physical 
 
The Patient's History and Physical   was reviewed with the patient and I examined the patient. There was no change. The surgical site was confirmed by the patient and me. Plan:  The risk, benefits, expected outcome, and alternative to the recommended procedure have been discussed with the patient. Patient understands and wants to proceed with the procedure.  
 
Electronically signed by Sivakumar Miller MD on 10/5/2020 at 11:25 AM

## 2020-10-05 NOTE — DISCHARGE INSTRUCTIONS
Trisha Edwards M.D.  [phone]    Instructions following Breast Surgery:    ACTIVITY:   Try to take a few short walks with help around the house later today. It is very important to take short walks to avoid blood clots and pneumonia.  You may be light-headed or sleepy from anesthesia, so be careful going up and down stairs.  Avoid any activity that involves lifting your operative-side arm above your head until your follow-up appointment. We suggest wearing a tight-fitting bra continuously for the next 48 hours to minimize motion of the breast.    DIET:   Start with clear, non-carbonated liquids when you get home (sugar-free if you are diabetic), such as Gatorade, chicken broth, etc., and you may advance to regular foods as you feel able. PAIN:   You will be given a prescription for pain medication.  Try to take the pain medication with food, even a few crackers.  You may also use Tylenol, Motrin, Advil, or Aleve instead of the prescription pain medication. Do no take Tylenol and the prescription pain medication within 3 hours of each other.  URINARY RETENTION: If you are unable to empty your bladder within 6-8 hours after returning home, please go to your nearest Emergency Department or Urgent Care for urinary catheterization. WOUND CARE:   Please keep the dressing dry and intact for 5 days after surgery. You may then remove the tape and gauze;  at this time it is fine to get the incision wet,  The steri-strips will probably remain on your skin for several more days.  It is not uncommon for the breast to have a bruised appearance in the region of the surgery; this will clear over several days.  Incisions will sometimes develop redness around them as well as a hard lumpy feel. If this area of redness continues to get larger, please call the office. FOLLOW UP:   Your follow-up appointment is usually made when your surgery is arranged.  Please call the office if you are not sure of this appointment. CALL THE DOCTOR IF:   You have a temperature higher than 101.5° Fahrenheit for more than 6 hours.  You have severe nausea or vomiting.  You develop increasing redness or infection at the incision. Continue home medications as previously prescribed. After general anesthesia or intravenous sedation, for 24 hours or while taking prescription Narcotics:  · Limit your activities  · A responsible adult needs to be with you for the next 24 hours  · Do not drive and operate hazardous machinery  · Do not make important personal or business decisions  · Do not drink alcoholic beverages  · If you have not urinated within 8 hours after discharge, please contact your surgeon on call. · If you have sleep apnea and have a CPAP machine, please use it for all naps and sleeping. · Please use caution when taking narcotics and any of your home medications that may cause drowsiness. *  Please give a list of your current medications to your Primary Care Provider. *  Please update this list whenever your medications are discontinued, doses are      changed, or new medications (including over-the-counter products) are added. *  Please carry medication information at all times in case of emergency situations. These are general instructions for a healthy lifestyle:  No smoking/ No tobacco products/ Avoid exposure to second hand smoke  Surgeon General's Warning:  Quitting smoking now greatly reduces serious risk to your health. Obesity, smoking, and sedentary lifestyle greatly increases your risk for illness  A healthy diet, regular physical exercise & weight monitoring are important for maintaining a healthy lifestyle    You may be retaining fluid if you have a history of heart failure or if you experience any of the following symptoms:  Weight gain of 3 pounds or more overnight or 5 pounds in a week, increased swelling in our hands or feet or shortness of breath while lying flat in bed. Please call your doctor as soon as you notice any of these symptoms; do not wait until your next office visit.

## 2020-10-05 NOTE — ANESTHESIA POSTPROCEDURE EVALUATION
Procedure(s):  RIGHT WIRE LOC PARTIAL MASTECTOMY  RIGHT SENTINEL NODE BIOPSY .     general    Anesthesia Post Evaluation      Multimodal analgesia: multimodal analgesia used between 6 hours prior to anesthesia start to PACU discharge  Patient location during evaluation: PACU  Patient participation: complete - patient participated  Level of consciousness: awake and awake and alert  Pain management: adequate  Airway patency: patent  Anesthetic complications: no  Cardiovascular status: acceptable  Respiratory status: acceptable  Hydration status: acceptable  Post anesthesia nausea and vomiting:  controlled      INITIAL Post-op Vital signs:   Vitals Value Taken Time   /72 10/5/2020  1:35 PM   Temp 36.8 °C (98.2 °F) 10/5/2020  1:20 PM   Pulse 80 10/5/2020  1:35 PM   Resp 16 10/5/2020  1:35 PM   SpO2 100 % 10/5/2020  1:35 PM

## 2020-10-05 NOTE — ANESTHESIA PREPROCEDURE EVALUATION
Relevant Problems   No relevant active problems       Anesthetic History     PONV          Review of Systems / Medical History  Patient summary reviewed, nursing notes reviewed and pertinent labs reviewed    Pulmonary                   Neuro/Psych              Cardiovascular    Hypertension: well controlled              Exercise tolerance: >4 METS     GI/Hepatic/Renal                Endo/Other      Hypothyroidism: well controlled       Other Findings              Physical Exam    Airway  Mallampati: II  TM Distance: 4 - 6 cm  Neck ROM: normal range of motion   Mouth opening: Normal     Cardiovascular  Regular rate and rhythm,  S1 and S2 normal,  no murmur, click, rub, or gallop             Dental    Dentition: Full lower dentures, Full upper dentures and Edentulous     Pulmonary  Breath sounds clear to auscultation               Abdominal         Other Findings            Anesthetic Plan    ASA: 2  Anesthesia type: general          Induction: Intravenous  Anesthetic plan and risks discussed with: Patient
Schizoaffective vs Schizophrenia

## 2020-10-05 NOTE — OP NOTES
Operative Note    Date of Surgery: 10/5/2020    Preoperative Diagnosis: right breast cancer     Postoperative Diagnosis: right breast cancer    Surgeon(s) and Role:     * Lolita Herr MD - Primary      Anesthesia: General    Procedure:   Procedure(s):  RIGHT WIRE LOC PARTIAL MASTECTOMY  RIGHT SENTINEL NODE BIOPSY     Indications:  As detailed in the H&P.       Procedure in Detail:  The patient was taken to the operating room, identified as Mindy Bryant, and the procedure verified. A Time Out was held and the above information confirmed. Prior to the operative procedure,  Mindy Bryant underwent a Right breast technetium nuclear medicine scan.  The technetium lymphoscintigraphy showed uptake in 1 axillary lymph nodes. She then underwent wire localization of her biopsy site. The patient was then brought to the operating room and placed on the table in a supine position with adequate padding to all pressure points and the arm extended laterally.  After induction of anesthesia, 5cc of Lymphazurin dye were injected subdermally in a periareolar location and massaged for several minutes and the chest and arm were then prepped and draped in the usual sterile manner. The Neoprobe was used to sweep the  axilla to confirm activity. Attention was then turned to the Right breast.  A curved, transverse incision was made in the area of the wire.  This was carried down with small superior and inferior flaps of subcutaneous tissues.  The posterior margin was the pectoralis fascia.  The area was excised circumferentially, marked in the usual manner, and imaged immediately for confirmation of clip capture and estimation of margins. Per immediate view of images, an additional 3-5mm layer of deep margin was excised, marked, and submitted.       A curvilinear incision was then made in the low axilla and was carried down through the subcutaneous tissues with cautery.  Careful sharp and blunt dissection was then used following visual and gamma probe cues to identify 2 axillary  nodes which were dissected from the surrounding tissues using clips to control lymphatic and vascular structures to the node, and each node was sent separately to pathology; both nodes were intensely blue stained with max ex-vivo count of 883 and 95471obd.  The probe was then used to sweep the axilla and no further activity >10% of lowest node count was noted.       Hemostasis was achieved in both sites.  Both areas were infiltrated with local and closed with interrupted 3-0 Vicryl and 4-0 Vicryl subcuticular sutures.  Steri-Strips were applied to the wound.  Steristrips, telfa and tegaderm was placed over the axillary incision.  A gentle pressure dressing was applied to the breast. Sponge and needle counts were correct times two.  The patient tolerated the procedure well.  The patient was transferred to recovery room in stable condition.                     Estimated Blood Loss: <50cc    Specimens:   ID Type Source Tests Collected by Time Destination   1 : RIGHT PARTIAL MASTECTOMY AND ADDITIONAL DEEP MARGIN Fresh Breast  Marion Lira MD 10/5/2020 1222 Pathology   2 : Right sentinel lymph node #1 Kenny Lira MD 10/5/2020 1250 Pathology   3 : additional axillary tissue Kenny Lira MD 10/5/2020 1254 Pathology   4 : right sentinel lymph node #2 Kenny Lira MD 10/5/2020 1257 Pathology        Signed By: Luis E Dimas MD     October 5, 2020

## 2020-10-05 NOTE — PERIOP NOTES
Called Dr. Dariusz Woodson. Pt c/o nausea; no vomiting at this time. She was given zofran in the OR at 12:30 and has a scop patch on. No further orders.

## 2020-10-05 NOTE — PERIOP NOTES
MD Weathers called and made aware of pt  and 687 systolic. States he is not concerned with it at this time.

## 2020-10-15 NOTE — H&P (VIEW-ONLY)
Ruth Adrian   presents for follow-up after wire localized partial mastectomy and sentinel node biopsy. She reports no problems with eating, bowel movements, voiding, or their wound. She is using the arm ad tyrone. Only c/o is some skin reaction due to breast dressing tape. EXAM: 
She  is in no distress There is minimal residual tenderness in the right breast, none in axilla Incision: both sites healing well without cellulitis but some ecchymosis at both sites. No hematoma/seroma palpable in breast 
 
 
 
 DIAGNOSIS  
A: RIGHT PARTIAL MASTECTOMY AND ADDITIONAL DEEP MARGIN: INVASIVE DUCTAL CARCINOMA, HIGH GRADE, 14 MM IN GREATEST DIMENSION, WITHIN 1 MM OF THE MEDIAL MARGIN, LYMPHOVASCULAR INVASION PRESENT. B: RIGHT SENTINEL LYMPH NODE #1: BENIGN LYMPH NODE, NEGATIVE FOR TUMOR (0/1). C: ADDITIONAL AXILLARY TISSUE: BENIGN FIBROADIPOSE TISSUE, NO LYMPH NODE TISSUE IDENTIFIED. D: RIGHT SENTINEL LYMPH NODE #2: BENIGN LYMPH NODE, NEGATIVE FOR TUMOR (0/1). ASSESSMENT/PLAN:  
 
1. Malignant neoplasm of right breast in female, estrogen receptor negative, unspecified site of breast ([facility] [address] 2. History of cancer of left breast   
  
  
Orders Placed This Encounter [address] [facility] Hematology and Oncology Referral Priority:   Routine Referral Type:   Consultation Referral Reason:   Specialty Services Required Referred to Provider:   Aisha Woods MD  
  Requested Specialty:   Oncology Number of Visits Requested:   1  REFERRAL TO RADIATION ONCOLOGY Referral Priority:   Routine Referral Type:   Consultation Referral Reason:   Specialty Services Required Referred to Provider:   Loc Shearer MD  
  Requested Specialty:   Radiation Oncology Number of Visits Requested:   1 Follow-up and Dispositions · Return for as needed.

## 2020-10-20 ENCOUNTER — PATIENT OUTREACH (OUTPATIENT)
Dept: CASE MANAGEMENT | Age: 70
End: 2020-10-20

## 2020-10-20 PROBLEM — E66.01 SEVERE OBESITY (HCC): Status: ACTIVE | Noted: 2020-10-20

## 2020-10-20 NOTE — PROGRESS NOTES
10/20/20 saw pt today with Dr. Velma Craft for initial medical oncology consult for right breast cancer. Post lumpectomy 10/5. Pathology reviewed - triple negative and 2 lymph nodes negative. She stated she has completed genetic testing in the past and will bring records to us. She was treated for breast cancer previously with Dr. Liane Jacobs. She is not sure what chemo regimen she received. DEMETRIA signed and we will obtain records. Recommend ACT if she didn't receive adriamycin prior. Referral to radiation made. Will arrange port placement and baseline echo. Plan to start chemo on 11/2. Provided opportunity to ask questions and all were discussed. Navigation contact information provided. Navigation will continue to follow.

## 2020-10-28 ENCOUNTER — ANESTHESIA EVENT (OUTPATIENT)
Dept: SURGERY | Age: 70
End: 2020-10-28
Payer: COMMERCIAL

## 2020-10-28 RX ORDER — CEFAZOLIN SODIUM 1 G/3ML
2-3 INJECTION, POWDER, FOR SOLUTION INTRAMUSCULAR; INTRAVENOUS
Status: DISCONTINUED | OUTPATIENT
Start: 2020-10-28 | End: 2020-10-28 | Stop reason: DRUGHIGH

## 2020-10-29 ENCOUNTER — APPOINTMENT (OUTPATIENT)
Dept: GENERAL RADIOLOGY | Age: 70
End: 2020-10-29
Attending: SURGERY
Payer: COMMERCIAL

## 2020-10-29 ENCOUNTER — ANESTHESIA (OUTPATIENT)
Dept: SURGERY | Age: 70
End: 2020-10-29
Payer: COMMERCIAL

## 2020-10-29 ENCOUNTER — HOSPITAL ENCOUNTER (OUTPATIENT)
Age: 70
Setting detail: OUTPATIENT SURGERY
Discharge: HOME | End: 2020-10-29
Attending: SURGERY | Admitting: SURGERY
Payer: COMMERCIAL

## 2020-10-29 VITALS
HEART RATE: 67 BPM | OXYGEN SATURATION: 100 % | RESPIRATION RATE: 16 BRPM | DIASTOLIC BLOOD PRESSURE: 67 MMHG | BODY MASS INDEX: 37.61 KG/M2 | HEIGHT: 66 IN | TEMPERATURE: 97.6 F | WEIGHT: 234 LBS | SYSTOLIC BLOOD PRESSURE: 151 MMHG

## 2020-10-29 DIAGNOSIS — C50.912 MALIGNANT NEOPLASM OF LEFT FEMALE BREAST, UNSPECIFIED ESTROGEN RECEPTOR STATUS, UNSPECIFIED SITE OF BREAST (HCC): Primary | ICD-10-CM

## 2020-10-29 PROCEDURE — 76060000033 HC ANESTHESIA 1 TO 1.5 HR: Performed by: SURGERY

## 2020-10-29 PROCEDURE — 76210000020 HC REC RM PH II FIRST 0.5 HR: Performed by: SURGERY

## 2020-10-29 PROCEDURE — 74011250636 HC RX REV CODE- 250/636: Performed by: REGISTERED NURSE

## 2020-10-29 PROCEDURE — C1788 PORT, INDWELLING, IMP: HCPCS | Performed by: SURGERY

## 2020-10-29 PROCEDURE — 76010000149 HC OR TIME 1 TO 1.5 HR: Performed by: SURGERY

## 2020-10-29 PROCEDURE — 36561 INSERT TUNNELED CV CATH: CPT | Performed by: SURGERY

## 2020-10-29 PROCEDURE — 74011250636 HC RX REV CODE- 250/636: Performed by: ANESTHESIOLOGY

## 2020-10-29 PROCEDURE — 76210000016 HC OR PH I REC 1 TO 1.5 HR: Performed by: SURGERY

## 2020-10-29 PROCEDURE — 77001 FLUOROGUIDE FOR VEIN DEVICE: CPT | Performed by: SURGERY

## 2020-10-29 PROCEDURE — 74011000250 HC RX REV CODE- 250: Performed by: REGISTERED NURSE

## 2020-10-29 PROCEDURE — 77030002986 HC SUT PROL J&J -A: Performed by: SURGERY

## 2020-10-29 PROCEDURE — 71045 X-RAY EXAM CHEST 1 VIEW: CPT

## 2020-10-29 PROCEDURE — 74011250636 HC RX REV CODE- 250/636: Performed by: SURGERY

## 2020-10-29 PROCEDURE — 2709999900 HC NON-CHARGEABLE SUPPLY: Performed by: SURGERY

## 2020-10-29 PROCEDURE — 77030031139 HC SUT VCRL2 J&J -A: Performed by: SURGERY

## 2020-10-29 PROCEDURE — 74011000250 HC RX REV CODE- 250: Performed by: SURGERY

## 2020-10-29 DEVICE — POWERPORT IMPLANTABLE PORT WITH PRE-ATTACHED 9.6F OPEN-ENDED SINGLE-LUMEN VENOUS CATHETER INTERMEDIATE KIT (WITH SUTURE PLUGS)
Type: IMPLANTABLE DEVICE | Site: CHEST | Status: NON-FUNCTIONAL
Brand: POWERPORT
Removed: 2021-10-27

## 2020-10-29 RX ORDER — MIDAZOLAM HYDROCHLORIDE 1 MG/ML
2 INJECTION, SOLUTION INTRAMUSCULAR; INTRAVENOUS
Status: DISCONTINUED | OUTPATIENT
Start: 2020-10-29 | End: 2020-10-29 | Stop reason: HOSPADM

## 2020-10-29 RX ORDER — BUPIVACAINE HYDROCHLORIDE 2.5 MG/ML
INJECTION, SOLUTION EPIDURAL; INFILTRATION; INTRACAUDAL AS NEEDED
Status: DISCONTINUED | OUTPATIENT
Start: 2020-10-29 | End: 2020-10-29 | Stop reason: HOSPADM

## 2020-10-29 RX ORDER — LIDOCAINE HYDROCHLORIDE 20 MG/ML
INJECTION, SOLUTION EPIDURAL; INFILTRATION; INTRACAUDAL; PERINEURAL AS NEEDED
Status: DISCONTINUED | OUTPATIENT
Start: 2020-10-29 | End: 2020-10-29 | Stop reason: HOSPADM

## 2020-10-29 RX ORDER — PROPOFOL 10 MG/ML
INJECTION, EMULSION INTRAVENOUS
Status: DISCONTINUED | OUTPATIENT
Start: 2020-10-29 | End: 2020-10-29 | Stop reason: HOSPADM

## 2020-10-29 RX ORDER — CEFAZOLIN SODIUM/WATER 2 G/20 ML
2 SYRINGE (ML) INTRAVENOUS
Status: COMPLETED | OUTPATIENT
Start: 2020-10-29 | End: 2020-10-29

## 2020-10-29 RX ORDER — PROPOFOL 10 MG/ML
INJECTION, EMULSION INTRAVENOUS AS NEEDED
Status: DISCONTINUED | OUTPATIENT
Start: 2020-10-29 | End: 2020-10-29 | Stop reason: HOSPADM

## 2020-10-29 RX ORDER — SODIUM CHLORIDE, SODIUM LACTATE, POTASSIUM CHLORIDE, CALCIUM CHLORIDE 600; 310; 30; 20 MG/100ML; MG/100ML; MG/100ML; MG/100ML
100 INJECTION, SOLUTION INTRAVENOUS CONTINUOUS
Status: DISCONTINUED | OUTPATIENT
Start: 2020-10-29 | End: 2020-10-29 | Stop reason: HOSPADM

## 2020-10-29 RX ORDER — HYDROMORPHONE HYDROCHLORIDE 2 MG/ML
0.5 INJECTION, SOLUTION INTRAMUSCULAR; INTRAVENOUS; SUBCUTANEOUS
Status: DISCONTINUED | OUTPATIENT
Start: 2020-10-29 | End: 2020-10-29 | Stop reason: HOSPADM

## 2020-10-29 RX ORDER — NALOXONE HYDROCHLORIDE 0.4 MG/ML
0.04 INJECTION, SOLUTION INTRAMUSCULAR; INTRAVENOUS; SUBCUTANEOUS
Status: DISCONTINUED | OUTPATIENT
Start: 2020-10-29 | End: 2020-10-29 | Stop reason: HOSPADM

## 2020-10-29 RX ORDER — OXYCODONE HYDROCHLORIDE 5 MG/1
5 TABLET ORAL
Status: DISCONTINUED | OUTPATIENT
Start: 2020-10-29 | End: 2020-10-29 | Stop reason: HOSPADM

## 2020-10-29 RX ORDER — MIDAZOLAM HYDROCHLORIDE 1 MG/ML
2 INJECTION, SOLUTION INTRAMUSCULAR; INTRAVENOUS ONCE
Status: DISCONTINUED | OUTPATIENT
Start: 2020-10-29 | End: 2020-10-29 | Stop reason: HOSPADM

## 2020-10-29 RX ORDER — HEPARIN 100 UNIT/ML
SYRINGE INTRAVENOUS AS NEEDED
Status: DISCONTINUED | OUTPATIENT
Start: 2020-10-29 | End: 2020-10-29 | Stop reason: HOSPADM

## 2020-10-29 RX ORDER — HYDROCODONE BITARTRATE AND ACETAMINOPHEN 5; 325 MG/1; MG/1
TABLET ORAL
Qty: 10 TAB | Refills: 0 | Status: SHIPPED
Start: 2020-10-29 | End: 2020-11-01

## 2020-10-29 RX ORDER — HEPARIN SODIUM 5000 [USP'U]/ML
INJECTION, SOLUTION INTRAVENOUS; SUBCUTANEOUS AS NEEDED
Status: DISCONTINUED | OUTPATIENT
Start: 2020-10-29 | End: 2020-10-29 | Stop reason: HOSPADM

## 2020-10-29 RX ORDER — LIDOCAINE HYDROCHLORIDE 10 MG/ML
0.1 INJECTION INFILTRATION; PERINEURAL AS NEEDED
Status: DISCONTINUED | OUTPATIENT
Start: 2020-10-29 | End: 2020-10-29 | Stop reason: HOSPADM

## 2020-10-29 RX ORDER — FENTANYL CITRATE 50 UG/ML
100 INJECTION, SOLUTION INTRAMUSCULAR; INTRAVENOUS ONCE
Status: DISCONTINUED | OUTPATIENT
Start: 2020-10-29 | End: 2020-10-29 | Stop reason: HOSPADM

## 2020-10-29 RX ADMIN — PROPOFOL 40 MG: 10 INJECTION, EMULSION INTRAVENOUS at 14:30

## 2020-10-29 RX ADMIN — PROPOFOL 20 MG: 10 INJECTION, EMULSION INTRAVENOUS at 14:36

## 2020-10-29 RX ADMIN — PROPOFOL 20 MG: 10 INJECTION, EMULSION INTRAVENOUS at 14:33

## 2020-10-29 RX ADMIN — SODIUM CHLORIDE, SODIUM LACTATE, POTASSIUM CHLORIDE, AND CALCIUM CHLORIDE 100 ML/HR: 600; 310; 30; 20 INJECTION, SOLUTION INTRAVENOUS at 13:13

## 2020-10-29 RX ADMIN — LIDOCAINE HYDROCHLORIDE 60 MG: 20 INJECTION, SOLUTION EPIDURAL; INFILTRATION; INTRACAUDAL; PERINEURAL at 14:30

## 2020-10-29 RX ADMIN — PROPOFOL 120 MCG/KG/MIN: 10 INJECTION, EMULSION INTRAVENOUS at 14:30

## 2020-10-29 RX ADMIN — Medication 2 G: at 14:40

## 2020-10-29 NOTE — PROGRESS NOTES
I spoke with Ms. Odin Dupree regarding her insurance coverage, potential oral medication authorizations, enrollment in the [address] [place] [address] ([facility]) and the [employer] ([address] [place] [address]), and assistance organization resource sheet. Ms. Odin Dupree has [employer]. For specialist visits, she has a $10 co-pay. For OP chemo, she will have a $225 co-pay and the drugs are covered at 80% with a 20% patient responsibility. Of her $4,900 max out of pocket, she has $4,675 remaining. I spoke with Ms. Odin Dupree about co-pay assistance grants and how they work. She does meet the financial criteria for a aurelia. Unfortunately, they are all presently closed to new enrollments. I let Ms. Odin Dupree know that one could open up and went over the LPOA document that would authorize a [address] [place] [address] representative to enroll her on her behalf. She agreed to sign the LPOA and did. In lieu of a co-pay assistance aurelia being open, I spoke with Ms. Odin Dupree about the [facility] free drug program.  I went over the application with her to apply for free [facility] for having a high patient responsibility. She agreed to submit it and signed the application. I next went over the hospital financial assistance application. I went over the application, income documentation needed, ways a person could be approved, determination timeframe, and how the effective dates work. Application was given to Ms. Ma. Next, I spoke with Ms. Ma regarding potential oral medication authorizations. I told her that if she ever had any problems getting her oral medications filled to give the dedicated [facility] , Abraham Claudio, a call. Most of the time, it is simply an authorization that needs to be done with the insurance company. Next, I spoke with Ms. Ma regarding enrolling with [facility] and [facility]. I went over some of the services that ACS and [facility] offers and the enrollment process.   She agreed to enroll into [address] [place] [address] and politely declined ACS enrollment. Next, I gave Ms. Ezekiel Emmanuel flyers about the free Yoga classes for cancer survivors and the Oncology Massage program.  I let her know that she can get a free 30 minute massage. Lastly, I gave Ms. Ezekiel Emmanuel a form with various resource organizations that could assist with specific needs (example:  transportation, lodging, preparing meals, home cleaning)    Faxed Doctors Form to the [employer] at [phone]. Phone [phone]. Form scanned into chart. Patient expressed understanding of the information above and all questions were answered to her satisfaction. LPOA will be scanned into chart.

## 2020-10-29 NOTE — INTERVAL H&P NOTE
Update History & Physical 
 
The Patient's History and Physical  was reviewed with the patient and I examined the patient. Planning adjuvant chemotherapy for 1.4cm triple negative high grade right IDC. The surgical site was confirmed by the patient and me. Lungs-CTA. Right breast incision healed well. CV-RRR After discussion, we will proceed with port insertion. We have discussed the technical details of the procedure(s) in detail. Risks reviewed include anesthetic risks, bleeding, infection, wound healing problems and cosmetic abnormalities, pneumothorax and long-term risk of port malfunction and DVT. All questions are answered.    
 
Electronically signed by Dori Mckeon MD on 10/29/2020 at 1:50 PM

## 2020-10-29 NOTE — OP NOTES
Port Operative Report      Patient: Leonid Ashraf MRN: [id number]      YOB: 1950  Age: 79 y.o. Sex: female         Date of Surgery: 10/29/2020    Preoperative Diagnosis: Malignant neoplasm of right female breast, unspecified estrogen receptor status, unspecified site of breast ([facility] [address] [C50.911]    Postoperative Diagnosis: Malignant neoplasm of right female breast, unspecified estrogen receptor status, unspecified site of breast ([facility] [address] [C50.911]     Procedure: Insertion of venous access device (port)  Surgeon: Deborah Mahoney MD    Assistants: Surgeon(s):  Deborah Mahoney MD    Anesthesia:  General     Procedure in Detail:  With informed consent obtained the patient was taken to the operating room and placed in the supine position with adequate padding to all pressure points, and after adequate intravenous sedation was achieved the neck and chest were prepped and draped sterilely. The left infraclavicular fossa was anesthetized. The needle was introduced into the  subclavian vein using an infraclavicular approach. A guidewire was then passed through the needle and confirmed fluoroscopically to be in the central venous circulation. Additional local anesthetic was placed below this puncture site on the chest wall to allow a pocket to be created with a 15-blade and Bovie cautery to allow positioning of the port in the subcutaneous tissue. The port was anchored to the pectoralis fascia with  2-0 prolene suture. The catheter was tunneled to the infraclavicular guide-wire site and cut to the proper length. The dilator/introducer  was then passed over the guidewire, the catheter was inserted through the introducer sheath as it was peeled apart and the tip of the catheter was confirmed flouroscopically to be in good position. There was no kink of the catheter when imaged fluoroscopically. The port aspirated venous blood well and flushed without inappropriate resistance.   It was flushed with heparinized saline and then locked with 2 cc of 100 units per cc of heparin. The pocket and puncture site were confirmed to be hemostatic and then the pocket was closed with interrupted 3-0 Vicryl suture in a deep dermal fashion. The skin at both sites was closed with  subcuticular 4-0 Vicryl suture. Steri-strips were placed beneath the Telfa and Tegaderm. The patient  was taken to recovery in good condition where a follow-up chest x-ray was ordered.            Signed By: Aide Arteaga MD     October 29, 2020

## 2020-10-29 NOTE — DISCHARGE INSTRUCTIONS
Please follow-up with oncology. Silvana Geiger M.D.  [phone]    Instructions following Port Insertion:    ACTIVITY:   Try to take a few short walks with help around the house later today. It is very important to take short walks to avoid blood clots and pneumonia.  You may be light-headed or sleepy from anesthesia, so be careful going up and down stairs.  Avoid any activity that involves lifting your operative-side arm above your head for 2-3days      DIET:  [facility] with clear, non-carbonated liquids when you get home (sugar-free if you are diabetic), such as Gatorade, chicken broth, etc., and you may advance to regular foods as you feel able. PAIN:   You will be given a prescription for pain medication.  Try to take the pain medication with food, even a few crackers.  You may also use Tylenol, Motrin, Advil, or Aleve instead of the prescription pain medication. Do no take Tylenol and the prescription pain medication within 3 hours of each other.  URINARY RETENTION: If you are unable to empty your bladder within 6-8 hours after returning home, please go to your nearest Emergency Department or Urgent Care for urinary catheterization. WOUND CARE:   You may shower tomorrow.  Please keep the dressing intact for 5 days after surgery. You may then remove the tape and gauze. The steri-strips will probably remain on your skin for several more days.  It is not uncommon for the chest to have a bruised appearance in the region of the surgery; this will clear over several days.  Incisions will sometimes develop redness around them, up to the size of a quarter, as well as a hard lumpy feel. If this redness continues to get larger, please call the office. CALL THE DOCTOR IF:   You have a temperature higher than 101.5° Fahrenheit for more than 6 hours.  You have severe nausea or vomiting.  You develop increasing redness or infection at the incision.      Continue home medications as previously prescribed. After general anesthesia or intravenous sedation, for 24 hours or while taking prescription Narcotics:  · Limit your activities  · A responsible adult needs to be with you for the next 24 hours  · Do not drive and operate hazardous machinery  · Do not make important personal or business decisions  · Do not drink alcoholic beverages  · If you have not urinated within 8 hours after discharge, and you are experiencing discomfort from urinary retention, please go to the nearest ED. · If you have sleep apnea and have a CPAP machine, please use it for all naps and sleeping. · Please use caution when taking narcotics and any of your home medications that may cause drowsiness. *  Please give a list of your current medications to your Primary Care Provider. *  Please update this list whenever your medications are discontinued, doses are      changed, or new medications (including over-the-counter products) are added. *  Please carry medication information at all times in case of emergency situations. These are general instructions for a healthy lifestyle:  No smoking/ No tobacco products/ Avoid exposure to second hand smoke  Surgeon General's Warning:  Quitting smoking now greatly reduces serious risk to your health. Obesity, smoking, and sedentary lifestyle greatly increases your risk for illness  A healthy diet, regular physical exercise & weight monitoring are important for maintaining a healthy lifestyle    You may be retaining fluid if you have a history of heart failure or if you experience any of the following symptoms:  Weight gain of 3 pounds or more overnight or 5 pounds in a week, increased swelling in our hands or feet or shortness of breath while lying flat in bed. Please call your doctor as soon as you notice any of these symptoms; do not wait until your next office visit.

## 2020-10-29 NOTE — ANESTHESIA POSTPROCEDURE EVALUATION
Procedure(s): INFUSAPORT INSERTION. total IV anesthesia    Anesthesia Post Evaluation        Patient location during evaluation: PACU  Patient participation: complete - patient participated  Level of consciousness: awake  Pain management: satisfactory to patient  Airway patency: patent  Anesthetic complications: no  Cardiovascular status: hemodynamically stable  Respiratory status: spontaneous ventilation  Hydration status: euvolemic  Post anesthesia nausea and vomiting:  none    CXR pending. INITIAL Post-op Vital signs:   Vitals Value Taken Time   /69 10/29/2020  4:14 PM   Temp 36.4 °C (97.6 °F) 10/29/2020  4:13 PM   Pulse 68 10/29/2020  4:14 PM   Resp 16 10/29/2020  4:09 PM   SpO2 97 % 10/29/2020  4:14 PM   Vitals shown include unvalidated device data.

## 2020-10-29 NOTE — INTERVAL H&P NOTE
Update History & Physical 
 
The Patient's History and Physical  was reviewed with the patient and I examined the patient. Planning for neoadjuvant chemotherapy. Presents today for port. We have discussed the technical details of the procedure(s) in detail. Risks reviewed include anesthetic risks, bleeding, infection, wound healing problems and cosmetic abnormalities, pneumothorax and long-term risk of port malfunction and DVT. All questions are answered. Plan:  The risk, benefits, expected outcome, and alternative to the recommended procedure have been discussed with the patient. Patient understands and wants to proceed with the procedure.  
 
Electronically signed by Jay Orosco MD on 10/29/2020 at 1:38 PM

## 2020-10-30 NOTE — PROGRESS NOTES
Patient was approved for the free [facility] program with Paul. E/D 10/29/2020 - 10/28/2021.

## 2020-11-02 ENCOUNTER — PATIENT OUTREACH (OUTPATIENT)
Dept: CASE MANAGEMENT | Age: 70
End: 2020-11-02

## 2020-11-02 ENCOUNTER — HOSPITAL ENCOUNTER (OUTPATIENT)
Dept: LAB | Age: 70
Discharge: HOME | End: 2020-11-02

## 2020-11-02 DIAGNOSIS — C50.911 MALIGNANT NEOPLASM OF RIGHT BREAST IN FEMALE, ESTROGEN RECEPTOR NEGATIVE, UNSPECIFIED SITE OF BREAST (HCC): ICD-10-CM

## 2020-11-02 DIAGNOSIS — Z17.1 MALIGNANT NEOPLASM OF RIGHT BREAST IN FEMALE, ESTROGEN RECEPTOR NEGATIVE, UNSPECIFIED SITE OF BREAST (HCC): ICD-10-CM

## 2020-11-02 NOTE — PROGRESS NOTES
11/2/2020 Saw the patient with Dr Kristal Higgins. We have tried to obtain records from [facility] regarding her previous treatment. We are unsure if she received Adriamycin so we will proceed with TC. We reviewed how to take nausea medication and steroids. Her port has skin irritation around it which appears to be from the tape. Will continue to follow.

## 2020-11-02 NOTE — PROGRESS NOTES
11/2/2020 The patient sent some documentation that she found regarding her previous chemotherapy. The documentation reflects her receiving Adriamycin and Taxol. I discussed this with Dr Grover Magallanes and called the patient. She will be receiving TC. Will continue to follow.

## 2020-11-03 ENCOUNTER — HOSPITAL ENCOUNTER (OUTPATIENT)
Dept: INFUSION THERAPY | Age: 70
Discharge: HOME | End: 2020-11-03
Payer: COMMERCIAL

## 2020-11-03 VITALS
WEIGHT: 241 LBS | OXYGEN SATURATION: 97 % | TEMPERATURE: 97.5 F | BODY MASS INDEX: 39.49 KG/M2 | HEART RATE: 92 BPM | RESPIRATION RATE: 18 BRPM | SYSTOLIC BLOOD PRESSURE: 162 MMHG | DIASTOLIC BLOOD PRESSURE: 54 MMHG

## 2020-11-03 DIAGNOSIS — C50.911 MALIGNANT NEOPLASM OF RIGHT BREAST IN FEMALE, ESTROGEN RECEPTOR NEGATIVE, UNSPECIFIED SITE OF BREAST (HCC): Primary | ICD-10-CM

## 2020-11-03 DIAGNOSIS — Z17.1 MALIGNANT NEOPLASM OF RIGHT BREAST IN FEMALE, ESTROGEN RECEPTOR NEGATIVE, UNSPECIFIED SITE OF BREAST (HCC): Primary | ICD-10-CM

## 2020-11-03 LAB
ALBUMIN SERPL-MCNC: 3.1 G/DL (ref 3.2–4.6)
ALBUMIN/GLOB SERPL: 0.8 {RATIO} (ref 1.2–3.5)
ALP SERPL-CCNC: 93 U/L (ref 50–136)
ALT SERPL-CCNC: 24 U/L (ref 12–65)
ANION GAP SERPL CALC-SCNC: 8 MMOL/L (ref 7–16)
AST SERPL-CCNC: 13 U/L (ref 15–37)
BASOPHILS # BLD: 0 K/UL (ref 0–0.2)
BASOPHILS NFR BLD: 0 % (ref 0–2)
BILIRUB SERPL-MCNC: 0.2 MG/DL (ref 0.2–1.1)
BUN SERPL-MCNC: 8 MG/DL (ref 8–23)
CALCIUM SERPL-MCNC: 9.4 MG/DL (ref 8.3–10.4)
CHLORIDE SERPL-SCNC: 109 MMOL/L (ref 98–107)
CO2 SERPL-SCNC: 23 MMOL/L (ref 21–32)
CREAT SERPL-MCNC: 1.2 MG/DL (ref 0.6–1)
DIFFERENTIAL METHOD BLD: ABNORMAL
EOSINOPHIL # BLD: 0 K/UL (ref 0–0.8)
EOSINOPHIL NFR BLD: 0 % (ref 0.5–7.8)
ERYTHROCYTE [DISTWIDTH] IN BLOOD BY AUTOMATED COUNT: 12.8 % (ref 11.9–14.6)
GLOBULIN SER CALC-MCNC: 3.9 G/DL (ref 2.3–3.5)
GLUCOSE SERPL-MCNC: 292 MG/DL (ref 65–100)
HCT VFR BLD AUTO: 39.5 % (ref 35.8–46.3)
HGB BLD-MCNC: 13 G/DL (ref 11.7–15.4)
IMM GRANULOCYTES # BLD AUTO: 0.1 K/UL (ref 0–0.5)
IMM GRANULOCYTES NFR BLD AUTO: 1 % (ref 0–5)
LYMPHOCYTES # BLD: 1 K/UL (ref 0.5–4.6)
LYMPHOCYTES NFR BLD: 10 % (ref 13–44)
MCH RBC QN AUTO: 30 PG (ref 26.1–32.9)
MCHC RBC AUTO-ENTMCNC: 32.9 G/DL (ref 31.4–35)
MCV RBC AUTO: 91.2 FL (ref 79.6–97.8)
MONOCYTES # BLD: 0.1 K/UL (ref 0.1–1.3)
MONOCYTES NFR BLD: 1 % (ref 4–12)
NEUTS SEG # BLD: 8.8 K/UL (ref 1.7–8.2)
NEUTS SEG NFR BLD: 88 % (ref 43–78)
NRBC # BLD: 0 K/UL (ref 0–0.2)
PLATELET # BLD AUTO: 140 K/UL (ref 150–450)
PMV BLD AUTO: 12.5 FL (ref 9.4–12.3)
POTASSIUM SERPL-SCNC: 3.8 MMOL/L (ref 3.5–5.1)
PROT SERPL-MCNC: 7 G/DL (ref 6.3–8.2)
RBC # BLD AUTO: 4.33 M/UL (ref 4.05–5.25)
SODIUM SERPL-SCNC: 140 MMOL/L (ref 136–145)
WBC # BLD AUTO: 9.9 K/UL (ref 4.3–11.1)

## 2020-11-03 PROCEDURE — 96417 CHEMO IV INFUS EACH ADDL SEQ: CPT

## 2020-11-03 PROCEDURE — 85025 COMPLETE CBC W/AUTO DIFF WBC: CPT

## 2020-11-03 PROCEDURE — 96375 TX/PRO/DX INJ NEW DRUG ADDON: CPT

## 2020-11-03 PROCEDURE — 96361 HYDRATE IV INFUSION ADD-ON: CPT

## 2020-11-03 PROCEDURE — 74011250636 HC RX REV CODE- 250/636: Performed by: INTERNAL MEDICINE

## 2020-11-03 PROCEDURE — 96413 CHEMO IV INFUSION 1 HR: CPT

## 2020-11-03 PROCEDURE — 80053 COMPREHEN METABOLIC PANEL: CPT

## 2020-11-03 RX ORDER — DEXAMETHASONE SODIUM PHOSPHATE 100 MG/10ML
10 INJECTION INTRAMUSCULAR; INTRAVENOUS ONCE
Status: COMPLETED | OUTPATIENT
Start: 2020-11-03 | End: 2020-11-03

## 2020-11-03 RX ORDER — SODIUM CHLORIDE 9 MG/ML
25 INJECTION, SOLUTION INTRAVENOUS CONTINUOUS
Status: ACTIVE | OUTPATIENT
Start: 2020-11-03 | End: 2020-11-03

## 2020-11-03 RX ORDER — SODIUM CHLORIDE 0.9 % (FLUSH) 0.9 %
10-30 SYRINGE (ML) INJECTION AS NEEDED
Status: DISCONTINUED | OUTPATIENT
Start: 2020-11-03 | End: 2020-11-05 | Stop reason: HOSPADM

## 2020-11-03 RX ORDER — SODIUM CHLORIDE 0.9 % (FLUSH) 0.9 %
10 SYRINGE (ML) INJECTION AS NEEDED
Status: ACTIVE | OUTPATIENT
Start: 2020-11-03 | End: 2020-11-03

## 2020-11-03 RX ORDER — HYDROCORTISONE SODIUM SUCCINATE 100 MG/2ML
100 INJECTION, POWDER, FOR SOLUTION INTRAMUSCULAR; INTRAVENOUS AS NEEDED
Status: ACTIVE | OUTPATIENT
Start: 2020-11-03 | End: 2020-11-03

## 2020-11-03 RX ORDER — DIPHENHYDRAMINE HYDROCHLORIDE 50 MG/ML
25 INJECTION, SOLUTION INTRAMUSCULAR; INTRAVENOUS AS NEEDED
Status: ACTIVE | OUTPATIENT
Start: 2020-11-03 | End: 2020-11-03

## 2020-11-03 RX ORDER — ONDANSETRON 2 MG/ML
8 INJECTION INTRAMUSCULAR; INTRAVENOUS ONCE
Status: COMPLETED | OUTPATIENT
Start: 2020-11-03 | End: 2020-11-03

## 2020-11-03 RX ADMIN — Medication 20 ML: at 08:05

## 2020-11-03 RX ADMIN — Medication 10 ML: at 11:26

## 2020-11-03 RX ADMIN — SODIUM CHLORIDE 500 ML: 900 INJECTION, SOLUTION INTRAVENOUS at 08:30

## 2020-11-03 RX ADMIN — ONDANSETRON 8 MG: 2 INJECTION INTRAMUSCULAR; INTRAVENOUS at 09:08

## 2020-11-03 RX ADMIN — DOCETAXEL 167 MG: 10 INJECTION, SOLUTION INTRAVENOUS at 09:41

## 2020-11-03 RX ADMIN — SODIUM CHLORIDE 25 ML/HR: 9 INJECTION, SOLUTION INTRAVENOUS at 08:20

## 2020-11-03 RX ADMIN — DEXAMETHASONE SODIUM PHOSPHATE 10 MG: 10 INJECTION INTRAMUSCULAR; INTRAVENOUS at 09:12

## 2020-11-03 RX ADMIN — CYCLOPHOSPHAMIDE 1338 MG: 1 INJECTION, POWDER, FOR SOLUTION INTRAVENOUS; ORAL at 10:45

## 2020-11-03 NOTE — PROGRESS NOTES
Arrived to the [facility] ambulatory with her daughter. taxotere and cytoxan completed. Patient tolerated well. Any issues or concerns during appointment: no.  Patient aware of next infusion appointment on  11/4 at 1600. Discharged to home ambulatory.

## 2020-11-04 ENCOUNTER — HOSPITAL ENCOUNTER (OUTPATIENT)
Dept: INFUSION THERAPY | Age: 70
Discharge: HOME | End: 2020-11-04
Payer: COMMERCIAL

## 2020-11-04 VITALS
SYSTOLIC BLOOD PRESSURE: 147 MMHG | RESPIRATION RATE: 18 BRPM | DIASTOLIC BLOOD PRESSURE: 67 MMHG | HEART RATE: 76 BPM | TEMPERATURE: 97.7 F | OXYGEN SATURATION: 96 %

## 2020-11-04 DIAGNOSIS — C50.911 MALIGNANT NEOPLASM OF RIGHT BREAST IN FEMALE, ESTROGEN RECEPTOR NEGATIVE, UNSPECIFIED SITE OF BREAST (HCC): Primary | ICD-10-CM

## 2020-11-04 DIAGNOSIS — Z17.1 MALIGNANT NEOPLASM OF RIGHT BREAST IN FEMALE, ESTROGEN RECEPTOR NEGATIVE, UNSPECIFIED SITE OF BREAST (HCC): Primary | ICD-10-CM

## 2020-11-04 PROCEDURE — 96372 THER/PROPH/DIAG INJ SC/IM: CPT

## 2020-11-04 PROCEDURE — 74011250636 HC RX REV CODE- 250/636: Performed by: INTERNAL MEDICINE

## 2020-11-04 RX ADMIN — PEGFILGRASTIM-CBQV 6 MG: 6 INJECTION, SOLUTION SUBCUTANEOUS at 16:10

## 2020-11-04 NOTE — PROGRESS NOTES
Arrived to the [facility]. Patrice completed.    Provided education on Udenyca   Patient instructed to report any side affects to ordering provider-  Patient tolerated well   Any issues or concerns during appointment: No  Patient aware of next infusion appointment on [address] @ 4363  Discharged home ambulatory with family member

## 2020-11-09 ENCOUNTER — TELEPHONE (OUTPATIENT)
Dept: ONCOLOGY | Age: 70
End: 2020-11-09

## 2020-11-09 NOTE — TELEPHONE ENCOUNTER
SW received referral from infusion RN Tia to assess pt needs. SW called pt's number and pt's daughter, Germaine Coon, answered and put pt on speakerphone also. SW introduced self and services. They stated they feel they are doing well at this time and working with financial navigator Sabino Truong. No needs identified. SW confirmed they have SW contact information and encouraged them to call should any needs arise. They verbalized understanding and appreciation. SW intends to follow up PRN.

## 2020-11-11 ENCOUNTER — HOSPITAL ENCOUNTER (OUTPATIENT)
Dept: LAB | Age: 70
Discharge: HOME | End: 2020-11-11
Payer: COMMERCIAL

## 2020-11-11 ENCOUNTER — PATIENT OUTREACH (OUTPATIENT)
Dept: CASE MANAGEMENT | Age: 70
End: 2020-11-11

## 2020-11-11 DIAGNOSIS — C50.911 MALIGNANT NEOPLASM OF RIGHT BREAST IN FEMALE, ESTROGEN RECEPTOR NEGATIVE, UNSPECIFIED SITE OF BREAST (HCC): ICD-10-CM

## 2020-11-11 DIAGNOSIS — Z17.1 MALIGNANT NEOPLASM OF RIGHT BREAST IN FEMALE, ESTROGEN RECEPTOR NEGATIVE, UNSPECIFIED SITE OF BREAST (HCC): ICD-10-CM

## 2020-11-11 DIAGNOSIS — R30.0 DYSURIA: ICD-10-CM

## 2020-11-11 LAB
ALBUMIN SERPL-MCNC: 3 G/DL (ref 3.2–4.6)
ALBUMIN/GLOB SERPL: 0.7 {RATIO} (ref 1.2–3.5)
ALP SERPL-CCNC: 88 U/L (ref 50–136)
ALT SERPL-CCNC: 24 U/L (ref 12–65)
ANION GAP SERPL CALC-SCNC: 5 MMOL/L (ref 7–16)
APPEARANCE UR: ABNORMAL
AST SERPL-CCNC: 21 U/L (ref 15–37)
BACTERIA URNS QL MICRO: ABNORMAL /HPF
BASOPHILS # BLD: 0 K/UL (ref 0–0.2)
BASOPHILS NFR BLD: 0 % (ref 0–2)
BILIRUB SERPL-MCNC: 0.3 MG/DL (ref 0.2–1.1)
BILIRUB UR QL: ABNORMAL
BUN SERPL-MCNC: 12 MG/DL (ref 8–23)
CALCIUM SERPL-MCNC: 9.2 MG/DL (ref 8.3–10.4)
CASTS URNS QL MICRO: 0 /LPF
CHLORIDE SERPL-SCNC: 109 MMOL/L (ref 98–107)
CO2 SERPL-SCNC: 22 MMOL/L (ref 21–32)
COLOR UR: YELLOW
CREAT SERPL-MCNC: 1.1 MG/DL (ref 0.6–1)
CRYSTALS URNS QL MICRO: 0 /LPF
DIFFERENTIAL METHOD BLD: ABNORMAL
EOSINOPHIL # BLD: 0 K/UL (ref 0–0.8)
EOSINOPHIL NFR BLD: 0 % (ref 0.5–7.8)
EPI CELLS #/AREA URNS HPF: ABNORMAL /HPF
ERYTHROCYTE [DISTWIDTH] IN BLOOD BY AUTOMATED COUNT: 12.9 % (ref 11.9–14.6)
GLOBULIN SER CALC-MCNC: 4.3 G/DL (ref 2.3–3.5)
GLUCOSE SERPL-MCNC: 117 MG/DL (ref 65–100)
GLUCOSE UR STRIP.AUTO-MCNC: NEGATIVE MG/DL
HCT VFR BLD AUTO: 41.4 % (ref 35.8–46.3)
HGB BLD-MCNC: 13.5 G/DL (ref 11.7–15.4)
HGB UR QL STRIP: ABNORMAL
IMM GRANULOCYTES # BLD AUTO: 1.6 K/UL (ref 0–0.5)
IMM GRANULOCYTES NFR BLD AUTO: 14 % (ref 0–5)
KETONES UR QL STRIP.AUTO: 15 MG/DL
LEUKOCYTE ESTERASE UR QL STRIP.AUTO: ABNORMAL
LYMPHOCYTES # BLD: 1.4 K/UL (ref 0.5–4.6)
LYMPHOCYTES NFR BLD: 12 % (ref 13–44)
MCH RBC QN AUTO: 29.3 PG (ref 26.1–32.9)
MCHC RBC AUTO-ENTMCNC: 32.6 G/DL (ref 31.4–35)
MCV RBC AUTO: 90 FL (ref 79.6–97.8)
MONOCYTES # BLD: 1.5 K/UL (ref 0.1–1.3)
MONOCYTES NFR BLD: 13 % (ref 4–12)
MUCOUS THREADS URNS QL MICRO: ABNORMAL /LPF
NEUTS SEG # BLD: 6.9 K/UL (ref 1.7–8.2)
NEUTS SEG NFR BLD: 61 % (ref 43–78)
NITRITE UR QL STRIP.AUTO: NEGATIVE
NRBC # BLD: 0.04 K/UL (ref 0–0.2)
PH UR STRIP: 6 [PH] (ref 5–9)
PLATELET # BLD AUTO: 157 K/UL (ref 150–450)
PLATELET COMMENTS,PCOM: ADEQUATE
PMV BLD AUTO: 11.8 FL (ref 9.4–12.3)
POTASSIUM SERPL-SCNC: 4.1 MMOL/L (ref 3.5–5.1)
PROT SERPL-MCNC: 7.3 G/DL (ref 6.3–8.2)
PROT UR STRIP-MCNC: 30 MG/DL
RBC # BLD AUTO: 4.6 M/UL (ref 4.05–5.25)
RBC #/AREA URNS HPF: ABNORMAL /HPF
RBC MORPH BLD: ABNORMAL
SODIUM SERPL-SCNC: 136 MMOL/L (ref 136–145)
SP GR UR REFRACTOMETRY: 1.02 (ref 1–1.02)
UA: UC IF INDICATED,UAUC: ABNORMAL
UROBILINOGEN UR QL STRIP.AUTO: 0.2 EU/DL (ref 0.2–1)
WBC # BLD AUTO: 11.4 K/UL (ref 4.3–11.1)
WBC MORPH BLD: ABNORMAL
WBC URNS QL MICRO: ABNORMAL /HPF

## 2020-11-11 PROCEDURE — 80053 COMPREHEN METABOLIC PANEL: CPT

## 2020-11-11 PROCEDURE — 87086 URINE CULTURE/COLONY COUNT: CPT

## 2020-11-11 PROCEDURE — 36415 COLL VENOUS BLD VENIPUNCTURE: CPT

## 2020-11-11 PROCEDURE — 85025 COMPLETE CBC W/AUTO DIFF WBC: CPT

## 2020-11-11 PROCEDURE — 81001 URINALYSIS AUTO W/SCOPE: CPT

## 2020-11-11 NOTE — PROGRESS NOTES
11/11/2020 Saw the patient with Jose E Lucas NP. She is here for toxicity check and some issues with rash. She has some on her hand and she does have area around the port that looks very irritated. We will send in Kenalog cream for the rash areas. She took compazine after chemo and felt terribly jittery and we have asked her to not take this any longer. Her nausea was controlled by the Zofran. Today we have sent Cipro for uti, diflucan for 7 days for possible yeast infection, kenalog for rash around port and hands, silvadene for vaginal area skin. Will continue to follow.

## 2020-11-13 LAB
BACTERIA SPEC CULT: NORMAL
BACTERIA SPEC CULT: NORMAL
SERVICE CMNT-IMP: NORMAL

## 2020-11-17 ENCOUNTER — APPOINTMENT (OUTPATIENT)
Dept: INFUSION THERAPY | Age: 70
End: 2020-11-17

## 2020-11-18 ENCOUNTER — APPOINTMENT (OUTPATIENT)
Dept: INFUSION THERAPY | Age: 70
End: 2020-11-18

## 2020-11-21 ENCOUNTER — HOME CARE VISIT (OUTPATIENT)
Dept: HOME HEALTH SERVICES | Facility: HOME HEALTH | Age: 70
End: 2020-11-21

## 2020-11-21 ENCOUNTER — HOME HEALTH ADMISSION (OUTPATIENT)
Dept: HOME HEALTH SERVICES | Facility: HOME HEALTH | Age: 70
End: 2020-11-21
Payer: COMMERCIAL

## 2020-11-24 ENCOUNTER — HOME CARE VISIT (OUTPATIENT)
Dept: SCHEDULING | Facility: HOME HEALTH | Age: 70
End: 2020-11-24
Payer: COMMERCIAL

## 2020-11-24 ENCOUNTER — PATIENT OUTREACH (OUTPATIENT)
Dept: CASE MANAGEMENT | Age: 70
End: 2020-11-24

## 2020-11-24 ENCOUNTER — HOSPITAL ENCOUNTER (OUTPATIENT)
Dept: INFUSION THERAPY | Age: 70
Discharge: HOME | End: 2020-11-24
Payer: COMMERCIAL

## 2020-11-24 DIAGNOSIS — C50.911 MALIGNANT NEOPLASM OF RIGHT BREAST IN FEMALE, ESTROGEN RECEPTOR NEGATIVE, UNSPECIFIED SITE OF BREAST (HCC): Primary | ICD-10-CM

## 2020-11-24 DIAGNOSIS — Z17.1 MALIGNANT NEOPLASM OF RIGHT BREAST IN FEMALE, ESTROGEN RECEPTOR NEGATIVE, UNSPECIFIED SITE OF BREAST (HCC): Primary | ICD-10-CM

## 2020-11-24 LAB
ALBUMIN SERPL-MCNC: 3.3 G/DL (ref 3.2–4.6)
ALBUMIN/GLOB SERPL: 0.8 {RATIO} (ref 1.2–3.5)
ALP SERPL-CCNC: 81 U/L (ref 50–136)
ALT SERPL-CCNC: 24 U/L (ref 12–65)
ANION GAP SERPL CALC-SCNC: 6 MMOL/L (ref 7–16)
AST SERPL-CCNC: 19 U/L (ref 15–37)
BASOPHILS # BLD: 0 K/UL (ref 0–0.2)
BASOPHILS NFR BLD: 0 % (ref 0–2)
BILIRUB SERPL-MCNC: 0.3 MG/DL (ref 0.2–1.1)
BUN SERPL-MCNC: 10 MG/DL (ref 8–23)
CALCIUM SERPL-MCNC: 9.7 MG/DL (ref 8.3–10.4)
CHLORIDE SERPL-SCNC: 110 MMOL/L (ref 98–107)
CO2 SERPL-SCNC: 25 MMOL/L (ref 21–32)
CREAT SERPL-MCNC: 1 MG/DL (ref 0.6–1)
DIFFERENTIAL METHOD BLD: ABNORMAL
EOSINOPHIL # BLD: 0 K/UL (ref 0–0.8)
EOSINOPHIL NFR BLD: 0 % (ref 0.5–7.8)
ERYTHROCYTE [DISTWIDTH] IN BLOOD BY AUTOMATED COUNT: 13.7 % (ref 11.9–14.6)
GLOBULIN SER CALC-MCNC: 4 G/DL (ref 2.3–3.5)
GLUCOSE SERPL-MCNC: 147 MG/DL (ref 65–100)
HCT VFR BLD AUTO: 38.7 % (ref 35.8–46.3)
HGB BLD-MCNC: 12.4 G/DL (ref 11.7–15.4)
IMM GRANULOCYTES # BLD AUTO: 0.1 K/UL (ref 0–0.5)
IMM GRANULOCYTES NFR BLD AUTO: 1 % (ref 0–5)
LYMPHOCYTES # BLD: 1.2 K/UL (ref 0.5–4.6)
LYMPHOCYTES NFR BLD: 11 % (ref 13–44)
MAGNESIUM SERPL-MCNC: 2.2 MG/DL (ref 1.8–2.4)
MCH RBC QN AUTO: 29.6 PG (ref 26.1–32.9)
MCHC RBC AUTO-ENTMCNC: 32 G/DL (ref 31.4–35)
MCV RBC AUTO: 92.4 FL (ref 79.6–97.8)
MONOCYTES # BLD: 0.4 K/UL (ref 0.1–1.3)
MONOCYTES NFR BLD: 3 % (ref 4–12)
NEUTS SEG # BLD: 9.3 K/UL (ref 1.7–8.2)
NEUTS SEG NFR BLD: 85 % (ref 43–78)
NRBC # BLD: 0 K/UL (ref 0–0.2)
PLATELET # BLD AUTO: 244 K/UL (ref 150–450)
PMV BLD AUTO: 11.5 FL (ref 9.4–12.3)
POTASSIUM SERPL-SCNC: 4.2 MMOL/L (ref 3.5–5.1)
PROT SERPL-MCNC: 7.3 G/DL (ref 6.3–8.2)
RBC # BLD AUTO: 4.19 M/UL (ref 4.05–5.25)
SODIUM SERPL-SCNC: 141 MMOL/L (ref 136–145)
WBC # BLD AUTO: 11 K/UL (ref 4.3–11.1)

## 2020-11-24 PROCEDURE — 96375 TX/PRO/DX INJ NEW DRUG ADDON: CPT

## 2020-11-24 PROCEDURE — 400013 HH SOC

## 2020-11-24 PROCEDURE — 80053 COMPREHEN METABOLIC PANEL: CPT

## 2020-11-24 PROCEDURE — 96417 CHEMO IV INFUS EACH ADDL SEQ: CPT

## 2020-11-24 PROCEDURE — 74011250636 HC RX REV CODE- 250/636: Performed by: NURSE PRACTITIONER

## 2020-11-24 PROCEDURE — 96413 CHEMO IV INFUSION 1 HR: CPT

## 2020-11-24 PROCEDURE — G0299 HHS/HOSPICE OF RN EA 15 MIN: HCPCS

## 2020-11-24 PROCEDURE — 83735 ASSAY OF MAGNESIUM: CPT

## 2020-11-24 PROCEDURE — 85025 COMPLETE CBC W/AUTO DIFF WBC: CPT

## 2020-11-24 RX ORDER — ONDANSETRON 2 MG/ML
8 INJECTION INTRAMUSCULAR; INTRAVENOUS ONCE
Status: COMPLETED | OUTPATIENT
Start: 2020-11-24 | End: 2020-11-24

## 2020-11-24 RX ORDER — DEXAMETHASONE SODIUM PHOSPHATE 100 MG/10ML
10 INJECTION INTRAMUSCULAR; INTRAVENOUS ONCE
Status: COMPLETED | OUTPATIENT
Start: 2020-11-24 | End: 2020-11-24

## 2020-11-24 RX ORDER — SODIUM CHLORIDE 0.9 % (FLUSH) 0.9 %
10 SYRINGE (ML) INJECTION AS NEEDED
Status: ACTIVE | OUTPATIENT
Start: 2020-11-24 | End: 2020-11-24

## 2020-11-24 RX ORDER — SODIUM CHLORIDE 9 MG/ML
25 INJECTION, SOLUTION INTRAVENOUS CONTINUOUS
Status: ACTIVE | OUTPATIENT
Start: 2020-11-24 | End: 2020-11-24

## 2020-11-24 RX ADMIN — Medication 10 ML: at 09:20

## 2020-11-24 RX ADMIN — CYCLOPHOSPHAMIDE 1338 MG: 1 INJECTION, POWDER, FOR SOLUTION INTRAVENOUS; ORAL at 11:06

## 2020-11-24 RX ADMIN — DEXAMETHASONE SODIUM PHOSPHATE 10 MG: 10 INJECTION INTRAMUSCULAR; INTRAVENOUS at 09:34

## 2020-11-24 RX ADMIN — DOCETAXEL 167 MG: 10 INJECTION, SOLUTION INTRAVENOUS at 10:01

## 2020-11-24 RX ADMIN — ONDANSETRON 8 MG: 2 INJECTION INTRAMUSCULAR; INTRAVENOUS at 09:32

## 2020-11-24 RX ADMIN — SODIUM CHLORIDE 25 ML/HR: 9 INJECTION, SOLUTION INTRAVENOUS at 09:20

## 2020-11-24 RX ADMIN — Medication 10 ML: at 11:49

## 2020-11-24 NOTE — PROGRESS NOTES
Pt arrived ambulatory to [facility] with port previously accessed with good blood return. NS infusing. Pre meds given as ordered. Pt aware of next appt on 11/25/20 at 1730. Taxotere infusing. Cytoxan infused. Port flushed and de accessed. Pt discharged ambulatory.

## 2020-11-24 NOTE — PROGRESS NOTES
11/24/2020 Saw the patient with Anna Bentley NP. She is due for cycle 2 of TC. We have referred her to home health and they will be admitting her tomorrow. She needs help with some ADL's as well as possibly benefiting from PT/OT. She would like to try to drink enough fluid to avoid having to come for IVF. Will continue to follow.

## 2020-11-25 ENCOUNTER — HOSPITAL ENCOUNTER (OUTPATIENT)
Dept: INFUSION THERAPY | Age: 70
Discharge: HOME | End: 2020-11-25
Payer: COMMERCIAL

## 2020-11-25 VITALS
OXYGEN SATURATION: 94 % | HEART RATE: 85 BPM | RESPIRATION RATE: 18 BRPM | SYSTOLIC BLOOD PRESSURE: 148 MMHG | DIASTOLIC BLOOD PRESSURE: 67 MMHG | TEMPERATURE: 97.1 F

## 2020-11-25 DIAGNOSIS — Z17.1 MALIGNANT NEOPLASM OF RIGHT BREAST IN FEMALE, ESTROGEN RECEPTOR NEGATIVE, UNSPECIFIED SITE OF BREAST (HCC): Primary | ICD-10-CM

## 2020-11-25 DIAGNOSIS — C50.911 MALIGNANT NEOPLASM OF RIGHT BREAST IN FEMALE, ESTROGEN RECEPTOR NEGATIVE, UNSPECIFIED SITE OF BREAST (HCC): Primary | ICD-10-CM

## 2020-11-25 PROCEDURE — 96372 THER/PROPH/DIAG INJ SC/IM: CPT

## 2020-11-25 PROCEDURE — 74011250636 HC RX REV CODE- 250/636: Performed by: NURSE PRACTITIONER

## 2020-11-25 RX ADMIN — PEGFILGRASTIM-CBQV 6 MG: 6 INJECTION, SOLUTION SUBCUTANEOUS at 14:15

## 2020-11-25 NOTE — PROGRESS NOTES
Pt arrived ambulatory. Udenyca given, pt tolerated well. Pt aware of next appt 12/15 at 0930. Discharged ambulatory, no distress noted.

## 2020-11-27 VITALS
OXYGEN SATURATION: 98 % | SYSTOLIC BLOOD PRESSURE: 130 MMHG | RESPIRATION RATE: 18 BRPM | WEIGHT: 237 LBS | HEART RATE: 80 BPM | BODY MASS INDEX: 39.49 KG/M2 | DIASTOLIC BLOOD PRESSURE: 68 MMHG | TEMPERATURE: 97.4 F | HEIGHT: 65 IN

## 2020-11-28 ENCOUNTER — HOME CARE VISIT (OUTPATIENT)
Dept: SCHEDULING | Facility: HOME HEALTH | Age: 70
End: 2020-11-28
Payer: COMMERCIAL

## 2020-11-28 VITALS
HEART RATE: 95 BPM | RESPIRATION RATE: 20 BRPM | TEMPERATURE: 98.4 F | SYSTOLIC BLOOD PRESSURE: 142 MMHG | OXYGEN SATURATION: 99 % | DIASTOLIC BLOOD PRESSURE: 70 MMHG

## 2020-11-28 PROCEDURE — G0299 HHS/HOSPICE OF RN EA 15 MIN: HCPCS

## 2020-11-30 ENCOUNTER — HOME CARE VISIT (OUTPATIENT)
Dept: SCHEDULING | Facility: HOME HEALTH | Age: 70
End: 2020-11-30
Payer: COMMERCIAL

## 2020-11-30 VITALS
HEART RATE: 80 BPM | RESPIRATION RATE: 16 BRPM | SYSTOLIC BLOOD PRESSURE: 138 MMHG | DIASTOLIC BLOOD PRESSURE: 60 MMHG | OXYGEN SATURATION: 95 % | TEMPERATURE: 96.6 F

## 2020-11-30 PROCEDURE — G0151 HHCP-SERV OF PT,EA 15 MIN: HCPCS

## 2020-12-01 ENCOUNTER — HOME CARE VISIT (OUTPATIENT)
Dept: SCHEDULING | Facility: HOME HEALTH | Age: 70
End: 2020-12-01
Payer: COMMERCIAL

## 2020-12-01 ENCOUNTER — APPOINTMENT (OUTPATIENT)
Dept: INFUSION THERAPY | Age: 70
End: 2020-12-01
Payer: COMMERCIAL

## 2020-12-01 VITALS
OXYGEN SATURATION: 95 % | RESPIRATION RATE: 16 BRPM | SYSTOLIC BLOOD PRESSURE: 122 MMHG | TEMPERATURE: 97.6 F | DIASTOLIC BLOOD PRESSURE: 70 MMHG

## 2020-12-01 PROCEDURE — G0299 HHS/HOSPICE OF RN EA 15 MIN: HCPCS

## 2020-12-01 PROCEDURE — G0152 HHCP-SERV OF OT,EA 15 MIN: HCPCS

## 2020-12-02 ENCOUNTER — APPOINTMENT (OUTPATIENT)
Dept: INFUSION THERAPY | Age: 70
End: 2020-12-02

## 2020-12-03 ENCOUNTER — HOME CARE VISIT (OUTPATIENT)
Dept: SCHEDULING | Facility: HOME HEALTH | Age: 70
End: 2020-12-03
Payer: COMMERCIAL

## 2020-12-03 VITALS
OXYGEN SATURATION: 97 % | HEART RATE: 79 BPM | RESPIRATION RATE: 16 BRPM | SYSTOLIC BLOOD PRESSURE: 142 MMHG | DIASTOLIC BLOOD PRESSURE: 80 MMHG | TEMPERATURE: 98.2 F

## 2020-12-03 VITALS
RESPIRATION RATE: 16 BRPM | SYSTOLIC BLOOD PRESSURE: 145 MMHG | TEMPERATURE: 97.4 F | DIASTOLIC BLOOD PRESSURE: 70 MMHG | HEART RATE: 74 BPM | OXYGEN SATURATION: 97 %

## 2020-12-03 PROCEDURE — G0155 HHCP-SVS OF CSW,EA 15 MIN: HCPCS

## 2020-12-03 PROCEDURE — G0151 HHCP-SERV OF PT,EA 15 MIN: HCPCS

## 2020-12-04 ENCOUNTER — HOME CARE VISIT (OUTPATIENT)
Dept: SCHEDULING | Facility: HOME HEALTH | Age: 70
End: 2020-12-04
Payer: COMMERCIAL

## 2020-12-04 PROCEDURE — G0158 HHC OT ASSISTANT EA 15: HCPCS

## 2020-12-04 PROCEDURE — G0299 HHS/HOSPICE OF RN EA 15 MIN: HCPCS

## 2020-12-06 VITALS
TEMPERATURE: 97.9 F | DIASTOLIC BLOOD PRESSURE: 66 MMHG | RESPIRATION RATE: 17 BRPM | HEART RATE: 68 BPM | OXYGEN SATURATION: 97 % | SYSTOLIC BLOOD PRESSURE: 100 MMHG

## 2020-12-07 ENCOUNTER — HOME CARE VISIT (OUTPATIENT)
Dept: SCHEDULING | Facility: HOME HEALTH | Age: 70
End: 2020-12-07
Payer: COMMERCIAL

## 2020-12-07 VITALS
TEMPERATURE: 97.2 F | HEART RATE: 78 BPM | SYSTOLIC BLOOD PRESSURE: 138 MMHG | DIASTOLIC BLOOD PRESSURE: 72 MMHG | RESPIRATION RATE: 16 BRPM | OXYGEN SATURATION: 92 %

## 2020-12-07 VITALS
TEMPERATURE: 97.7 F | HEART RATE: 90 BPM | DIASTOLIC BLOOD PRESSURE: 66 MMHG | OXYGEN SATURATION: 98 % | RESPIRATION RATE: 16 BRPM | SYSTOLIC BLOOD PRESSURE: 100 MMHG

## 2020-12-07 VITALS
HEART RATE: 93 BPM | RESPIRATION RATE: 18 BRPM | DIASTOLIC BLOOD PRESSURE: 76 MMHG | SYSTOLIC BLOOD PRESSURE: 140 MMHG | TEMPERATURE: 99.8 F

## 2020-12-07 PROCEDURE — G0151 HHCP-SERV OF PT,EA 15 MIN: HCPCS

## 2020-12-07 PROCEDURE — G0158 HHC OT ASSISTANT EA 15: HCPCS

## 2020-12-08 ENCOUNTER — HOME CARE VISIT (OUTPATIENT)
Dept: SCHEDULING | Facility: HOME HEALTH | Age: 70
End: 2020-12-08
Payer: COMMERCIAL

## 2020-12-08 ENCOUNTER — PATIENT OUTREACH (OUTPATIENT)
Dept: CASE MANAGEMENT | Age: 70
End: 2020-12-08

## 2020-12-08 PROCEDURE — G0299 HHS/HOSPICE OF RN EA 15 MIN: HCPCS

## 2020-12-08 NOTE — PROGRESS NOTES
12/8/2020 The patient has requested the following: home health care said I should get a rollator with a seat, so I can go outside and walk more. Can you tell me who needs to give me the order. The [facility] said they have them and takes my insurance. They said to just fax the order to [phone]. I have faxed the order.

## 2020-12-09 ENCOUNTER — HOME CARE VISIT (OUTPATIENT)
Dept: SCHEDULING | Facility: HOME HEALTH | Age: 70
End: 2020-12-09
Payer: COMMERCIAL

## 2020-12-09 VITALS
OXYGEN SATURATION: 97 % | DIASTOLIC BLOOD PRESSURE: 67 MMHG | TEMPERATURE: 97.9 F | SYSTOLIC BLOOD PRESSURE: 100 MMHG | RESPIRATION RATE: 17 BRPM | HEART RATE: 97 BPM

## 2020-12-09 PROCEDURE — G0158 HHC OT ASSISTANT EA 15: HCPCS

## 2020-12-09 PROCEDURE — G0157 HHC PT ASSISTANT EA 15: HCPCS

## 2020-12-10 VITALS
DIASTOLIC BLOOD PRESSURE: 76 MMHG | TEMPERATURE: 97.3 F | HEART RATE: 89 BPM | OXYGEN SATURATION: 98 % | SYSTOLIC BLOOD PRESSURE: 134 MMHG | RESPIRATION RATE: 16 BRPM

## 2020-12-11 ENCOUNTER — HOME CARE VISIT (OUTPATIENT)
Dept: SCHEDULING | Facility: HOME HEALTH | Age: 70
End: 2020-12-11
Payer: COMMERCIAL

## 2020-12-11 VITALS
RESPIRATION RATE: 16 BRPM | HEART RATE: 92 BPM | DIASTOLIC BLOOD PRESSURE: 78 MMHG | SYSTOLIC BLOOD PRESSURE: 122 MMHG | TEMPERATURE: 97.8 F | OXYGEN SATURATION: 98 %

## 2020-12-11 PROCEDURE — G0299 HHS/HOSPICE OF RN EA 15 MIN: HCPCS

## 2020-12-13 VITALS
SYSTOLIC BLOOD PRESSURE: 122 MMHG | HEART RATE: 73 BPM | DIASTOLIC BLOOD PRESSURE: 69 MMHG | TEMPERATURE: 97.3 F | RESPIRATION RATE: 18 BRPM

## 2020-12-14 ENCOUNTER — HOME CARE VISIT (OUTPATIENT)
Dept: SCHEDULING | Facility: HOME HEALTH | Age: 70
End: 2020-12-14
Payer: COMMERCIAL

## 2020-12-14 VITALS
TEMPERATURE: 98 F | HEART RATE: 96 BPM | DIASTOLIC BLOOD PRESSURE: 70 MMHG | SYSTOLIC BLOOD PRESSURE: 124 MMHG | RESPIRATION RATE: 18 BRPM

## 2020-12-14 PROCEDURE — G0151 HHCP-SERV OF PT,EA 15 MIN: HCPCS

## 2020-12-15 ENCOUNTER — PATIENT OUTREACH (OUTPATIENT)
Dept: CASE MANAGEMENT | Age: 70
End: 2020-12-15

## 2020-12-15 ENCOUNTER — APPOINTMENT (OUTPATIENT)
Dept: INFUSION THERAPY | Age: 70
End: 2020-12-15
Payer: COMMERCIAL

## 2020-12-15 ENCOUNTER — HOSPITAL ENCOUNTER (OUTPATIENT)
Dept: INFUSION THERAPY | Age: 70
Discharge: HOME | End: 2020-12-15
Payer: COMMERCIAL

## 2020-12-15 DIAGNOSIS — C50.911 MALIGNANT NEOPLASM OF RIGHT BREAST IN FEMALE, ESTROGEN RECEPTOR NEGATIVE, UNSPECIFIED SITE OF BREAST (HCC): Primary | ICD-10-CM

## 2020-12-15 DIAGNOSIS — Z17.1 MALIGNANT NEOPLASM OF RIGHT BREAST IN FEMALE, ESTROGEN RECEPTOR NEGATIVE, UNSPECIFIED SITE OF BREAST (HCC): Primary | ICD-10-CM

## 2020-12-15 LAB
ALBUMIN SERPL-MCNC: 3.3 G/DL (ref 3.2–4.6)
ALBUMIN/GLOB SERPL: 0.9 {RATIO} (ref 1.2–3.5)
ALP SERPL-CCNC: 104 U/L (ref 50–136)
ALT SERPL-CCNC: 30 U/L (ref 12–65)
ANION GAP SERPL CALC-SCNC: 8 MMOL/L (ref 7–16)
AST SERPL-CCNC: 26 U/L (ref 15–37)
BASOPHILS # BLD: 0 K/UL (ref 0–0.2)
BASOPHILS NFR BLD: 0 % (ref 0–2)
BILIRUB SERPL-MCNC: 0.3 MG/DL (ref 0.2–1.1)
BUN SERPL-MCNC: 6 MG/DL (ref 8–23)
CALCIUM SERPL-MCNC: 9.2 MG/DL (ref 8.3–10.4)
CHLORIDE SERPL-SCNC: 109 MMOL/L (ref 98–107)
CO2 SERPL-SCNC: 24 MMOL/L (ref 21–32)
CREAT SERPL-MCNC: 1.1 MG/DL (ref 0.6–1)
DIFFERENTIAL METHOD BLD: ABNORMAL
EOSINOPHIL # BLD: 0 K/UL (ref 0–0.8)
EOSINOPHIL NFR BLD: 0 % (ref 0.5–7.8)
ERYTHROCYTE [DISTWIDTH] IN BLOOD BY AUTOMATED COUNT: 15.6 % (ref 11.9–14.6)
GLOBULIN SER CALC-MCNC: 3.8 G/DL (ref 2.3–3.5)
GLUCOSE SERPL-MCNC: 227 MG/DL (ref 65–100)
HCT VFR BLD AUTO: 37.4 % (ref 35.8–46.3)
HGB BLD-MCNC: 11.9 G/DL (ref 11.7–15.4)
IMM GRANULOCYTES # BLD AUTO: 0 K/UL (ref 0–0.5)
IMM GRANULOCYTES NFR BLD AUTO: 0 % (ref 0–5)
LYMPHOCYTES # BLD: 1.1 K/UL (ref 0.5–4.6)
LYMPHOCYTES NFR BLD: 15 % (ref 13–44)
MAGNESIUM SERPL-MCNC: 2.1 MG/DL (ref 1.8–2.4)
MCH RBC QN AUTO: 30.1 PG (ref 26.1–32.9)
MCHC RBC AUTO-ENTMCNC: 31.8 G/DL (ref 31.4–35)
MCV RBC AUTO: 94.4 FL (ref 79.6–97.8)
MONOCYTES # BLD: 0.1 K/UL (ref 0.1–1.3)
MONOCYTES NFR BLD: 1 % (ref 4–12)
NEUTS SEG # BLD: 6 K/UL (ref 1.7–8.2)
NEUTS SEG NFR BLD: 84 % (ref 43–78)
NRBC # BLD: 0 K/UL (ref 0–0.2)
PLATELET # BLD AUTO: 164 K/UL (ref 150–450)
PMV BLD AUTO: 11.6 FL (ref 9.4–12.3)
POTASSIUM SERPL-SCNC: 4.3 MMOL/L (ref 3.5–5.1)
PROT SERPL-MCNC: 7.1 G/DL (ref 6.3–8.2)
RBC # BLD AUTO: 3.96 M/UL (ref 4.05–5.25)
SODIUM SERPL-SCNC: 141 MMOL/L (ref 136–145)
WBC # BLD AUTO: 7.1 K/UL (ref 4.3–11.1)

## 2020-12-15 PROCEDURE — 96417 CHEMO IV INFUS EACH ADDL SEQ: CPT

## 2020-12-15 PROCEDURE — 83735 ASSAY OF MAGNESIUM: CPT

## 2020-12-15 PROCEDURE — 96413 CHEMO IV INFUSION 1 HR: CPT

## 2020-12-15 PROCEDURE — 96375 TX/PRO/DX INJ NEW DRUG ADDON: CPT

## 2020-12-15 PROCEDURE — 80053 COMPREHEN METABOLIC PANEL: CPT

## 2020-12-15 PROCEDURE — 74011250636 HC RX REV CODE- 250/636: Performed by: INTERNAL MEDICINE

## 2020-12-15 PROCEDURE — 85025 COMPLETE CBC W/AUTO DIFF WBC: CPT

## 2020-12-15 RX ORDER — ONDANSETRON 2 MG/ML
8 INJECTION INTRAMUSCULAR; INTRAVENOUS ONCE
Status: COMPLETED | OUTPATIENT
Start: 2020-12-15 | End: 2020-12-15

## 2020-12-15 RX ORDER — SODIUM CHLORIDE 0.9 % (FLUSH) 0.9 %
10 SYRINGE (ML) INJECTION AS NEEDED
Status: ACTIVE | OUTPATIENT
Start: 2020-12-15 | End: 2020-12-15

## 2020-12-15 RX ORDER — DEXAMETHASONE SODIUM PHOSPHATE 100 MG/10ML
10 INJECTION INTRAMUSCULAR; INTRAVENOUS ONCE
Status: COMPLETED | OUTPATIENT
Start: 2020-12-15 | End: 2020-12-15

## 2020-12-15 RX ORDER — SODIUM CHLORIDE 9 MG/ML
25 INJECTION, SOLUTION INTRAVENOUS CONTINUOUS
Status: ACTIVE | OUTPATIENT
Start: 2020-12-15 | End: 2020-12-15

## 2020-12-15 RX ADMIN — DEXAMETHASONE SODIUM PHOSPHATE 10 MG: 10 INJECTION INTRAMUSCULAR; INTRAVENOUS at 09:44

## 2020-12-15 RX ADMIN — DOCETAXEL 167 MG: 10 INJECTION, SOLUTION INTRAVENOUS at 10:24

## 2020-12-15 RX ADMIN — ONDANSETRON 8 MG: 2 INJECTION INTRAMUSCULAR; INTRAVENOUS at 09:43

## 2020-12-15 RX ADMIN — Medication 10 ML: at 12:02

## 2020-12-15 RX ADMIN — SODIUM CHLORIDE 25 ML/HR: 900 INJECTION, SOLUTION INTRAVENOUS at 10:24

## 2020-12-15 RX ADMIN — CYCLOPHOSPHAMIDE 1338 MG: 1 INJECTION, POWDER, FOR SOLUTION INTRAVENOUS; ORAL at 11:28

## 2020-12-15 RX ADMIN — SODIUM CHLORIDE 500 ML: 900 INJECTION, SOLUTION INTRAVENOUS at 09:43

## 2020-12-15 NOTE — PROGRESS NOTES
12/15/2020  Patient seen with Dr Sharon Livingston for PET scan follow-up office visit. Questions and discussion completed to the satisfaction of the patient. # moth FU OV made, request for Thoracic Tumor board emailed for 12/16 if possible, if not, then 12/30 Thoracic Tumor Board per Dr Sharon Livingston. Prescription for labs for 3/9 OV handed to patient as labs are drawn and resulted thru [address] [place] [address]. Patient wishes to continue with treatment plan and navigation will continue to follow patient.

## 2020-12-15 NOTE — PROGRESS NOTES
Arrived to the [facility]. Taxotere and Cytoxan completed. Patient tolerated without problems. Any issues or concerns during appointment: no.  Patient aware of next infusion appointment on 12/16/20 (date) at [phone] (time). Discharged ambulatory with family.

## 2020-12-15 NOTE — PROGRESS NOTES
Problem: Patient Education: Go to Patient Education Activity  Goal: Patient/Family Education  Outcome: Progressing Towards Goal     Problem: Knowledge Deficit  Goal: *Verbalizes understanding of procedures and medications  Outcome: Progressing Towards Goal

## 2020-12-16 ENCOUNTER — HOSPITAL ENCOUNTER (OUTPATIENT)
Dept: INFUSION THERAPY | Age: 70
Discharge: HOME | End: 2020-12-16
Payer: COMMERCIAL

## 2020-12-16 ENCOUNTER — APPOINTMENT (OUTPATIENT)
Dept: INFUSION THERAPY | Age: 70
End: 2020-12-16
Payer: COMMERCIAL

## 2020-12-16 VITALS
TEMPERATURE: 97.8 F | HEART RATE: 89 BPM | SYSTOLIC BLOOD PRESSURE: 137 MMHG | OXYGEN SATURATION: 97 % | RESPIRATION RATE: 18 BRPM | DIASTOLIC BLOOD PRESSURE: 68 MMHG

## 2020-12-16 DIAGNOSIS — C50.911 MALIGNANT NEOPLASM OF RIGHT BREAST IN FEMALE, ESTROGEN RECEPTOR NEGATIVE, UNSPECIFIED SITE OF BREAST (HCC): Primary | ICD-10-CM

## 2020-12-16 DIAGNOSIS — Z17.1 MALIGNANT NEOPLASM OF RIGHT BREAST IN FEMALE, ESTROGEN RECEPTOR NEGATIVE, UNSPECIFIED SITE OF BREAST (HCC): Primary | ICD-10-CM

## 2020-12-16 PROCEDURE — 74011250636 HC RX REV CODE- 250/636: Performed by: INTERNAL MEDICINE

## 2020-12-16 PROCEDURE — 96372 THER/PROPH/DIAG INJ SC/IM: CPT

## 2020-12-16 RX ADMIN — PEGFILGRASTIM-CBQV 6 MG: 6 INJECTION, SOLUTION SUBCUTANEOUS at 17:39

## 2020-12-16 NOTE — PROGRESS NOTES
Arrived to the [facility]. Assessment complete. Udenyca completed. Patient tolerated without problems. Any issues or concerns during appointment: None. Patient aware of next infusion appointment on 1/5/2021 (date) at 56 (time). Discharged ambulatory.

## 2020-12-17 ENCOUNTER — HOME CARE VISIT (OUTPATIENT)
Dept: SCHEDULING | Facility: HOME HEALTH | Age: 70
End: 2020-12-17
Payer: COMMERCIAL

## 2020-12-17 PROCEDURE — G0152 HHCP-SERV OF OT,EA 15 MIN: HCPCS

## 2020-12-18 VITALS
HEART RATE: 74 BPM | TEMPERATURE: 98.4 F | RESPIRATION RATE: 16 BRPM | SYSTOLIC BLOOD PRESSURE: 142 MMHG | DIASTOLIC BLOOD PRESSURE: 70 MMHG

## 2021-01-05 ENCOUNTER — HOSPITAL ENCOUNTER (OUTPATIENT)
Dept: INFUSION THERAPY | Age: 71
Discharge: HOME | End: 2021-01-05
Payer: COMMERCIAL

## 2021-01-05 ENCOUNTER — PATIENT OUTREACH (OUTPATIENT)
Dept: CASE MANAGEMENT | Age: 71
End: 2021-01-05

## 2021-01-05 DIAGNOSIS — C50.911 MALIGNANT NEOPLASM OF RIGHT BREAST IN FEMALE, ESTROGEN RECEPTOR NEGATIVE, UNSPECIFIED SITE OF BREAST (HCC): Primary | ICD-10-CM

## 2021-01-05 DIAGNOSIS — Z17.1 MALIGNANT NEOPLASM OF RIGHT BREAST IN FEMALE, ESTROGEN RECEPTOR NEGATIVE, UNSPECIFIED SITE OF BREAST (HCC): Primary | ICD-10-CM

## 2021-01-05 LAB
ALBUMIN SERPL-MCNC: 3.3 G/DL (ref 3.2–4.6)
ALBUMIN/GLOB SERPL: 1.1 {RATIO} (ref 1.2–3.5)
ALP SERPL-CCNC: 70 U/L (ref 50–136)
ALT SERPL-CCNC: 27 U/L (ref 12–65)
ANION GAP SERPL CALC-SCNC: 9 MMOL/L (ref 7–16)
AST SERPL-CCNC: 19 U/L (ref 15–37)
BASOPHILS # BLD: 0 K/UL (ref 0–0.2)
BASOPHILS NFR BLD: 0 % (ref 0–2)
BILIRUB SERPL-MCNC: 0.3 MG/DL (ref 0.2–1.1)
BUN SERPL-MCNC: 11 MG/DL (ref 8–23)
CALCIUM SERPL-MCNC: 9.1 MG/DL (ref 8.3–10.4)
CHLORIDE SERPL-SCNC: 108 MMOL/L (ref 98–107)
CO2 SERPL-SCNC: 23 MMOL/L (ref 21–32)
CREAT SERPL-MCNC: 1.1 MG/DL (ref 0.6–1)
DIFFERENTIAL METHOD BLD: ABNORMAL
EOSINOPHIL # BLD: 0 K/UL (ref 0–0.8)
EOSINOPHIL NFR BLD: 0 % (ref 0.5–7.8)
ERYTHROCYTE [DISTWIDTH] IN BLOOD BY AUTOMATED COUNT: 16 % (ref 11.9–14.6)
GLOBULIN SER CALC-MCNC: 3.1 G/DL (ref 2.3–3.5)
GLUCOSE SERPL-MCNC: 167 MG/DL (ref 65–100)
HCT VFR BLD AUTO: 34.8 % (ref 35.8–46.3)
HGB BLD-MCNC: 11 G/DL (ref 11.7–15.4)
IMM GRANULOCYTES # BLD AUTO: 0 K/UL (ref 0–0.5)
IMM GRANULOCYTES NFR BLD AUTO: 0 % (ref 0–5)
LYMPHOCYTES # BLD: 0.8 K/UL (ref 0.5–4.6)
LYMPHOCYTES NFR BLD: 8 % (ref 13–44)
MAGNESIUM SERPL-MCNC: 2.2 MG/DL (ref 1.8–2.4)
MCH RBC QN AUTO: 30 PG (ref 26.1–32.9)
MCHC RBC AUTO-ENTMCNC: 31.6 G/DL (ref 31.4–35)
MCV RBC AUTO: 94.8 FL (ref 79.6–97.8)
MONOCYTES # BLD: 0.2 K/UL (ref 0.1–1.3)
MONOCYTES NFR BLD: 2 % (ref 4–12)
NEUTS SEG # BLD: 8.8 K/UL (ref 1.7–8.2)
NEUTS SEG NFR BLD: 90 % (ref 43–78)
NRBC # BLD: 0 K/UL (ref 0–0.2)
PLATELET # BLD AUTO: 155 K/UL (ref 150–450)
PMV BLD AUTO: 11 FL (ref 9.4–12.3)
POTASSIUM SERPL-SCNC: 4.2 MMOL/L (ref 3.5–5.1)
PROT SERPL-MCNC: 6.4 G/DL (ref 6.3–8.2)
RBC # BLD AUTO: 3.67 M/UL (ref 4.05–5.25)
SODIUM SERPL-SCNC: 140 MMOL/L (ref 136–145)
WBC # BLD AUTO: 9.9 K/UL (ref 4.3–11.1)

## 2021-01-05 PROCEDURE — 96417 CHEMO IV INFUS EACH ADDL SEQ: CPT

## 2021-01-05 PROCEDURE — 96375 TX/PRO/DX INJ NEW DRUG ADDON: CPT

## 2021-01-05 PROCEDURE — 74011250636 HC RX REV CODE- 250/636: Performed by: INTERNAL MEDICINE

## 2021-01-05 PROCEDURE — 83735 ASSAY OF MAGNESIUM: CPT

## 2021-01-05 PROCEDURE — 85025 COMPLETE CBC W/AUTO DIFF WBC: CPT

## 2021-01-05 PROCEDURE — 80053 COMPREHEN METABOLIC PANEL: CPT

## 2021-01-05 PROCEDURE — 96413 CHEMO IV INFUSION 1 HR: CPT

## 2021-01-05 RX ORDER — SODIUM CHLORIDE 0.9 % (FLUSH) 0.9 %
10 SYRINGE (ML) INJECTION AS NEEDED
Status: ACTIVE | OUTPATIENT
Start: 2021-01-05 | End: 2021-01-05

## 2021-01-05 RX ORDER — ONDANSETRON 2 MG/ML
8 INJECTION INTRAMUSCULAR; INTRAVENOUS ONCE
Status: COMPLETED | OUTPATIENT
Start: 2021-01-05 | End: 2021-01-05

## 2021-01-05 RX ORDER — DEXAMETHASONE SODIUM PHOSPHATE 100 MG/10ML
10 INJECTION INTRAMUSCULAR; INTRAVENOUS ONCE
Status: COMPLETED | OUTPATIENT
Start: 2021-01-05 | End: 2021-01-05

## 2021-01-05 RX ADMIN — CYCLOPHOSPHAMIDE 1338 MG: 1 INJECTION, POWDER, FOR SOLUTION INTRAVENOUS; ORAL at 12:20

## 2021-01-05 RX ADMIN — DEXAMETHASONE SODIUM PHOSPHATE 10 MG: 10 INJECTION INTRAMUSCULAR; INTRAVENOUS at 10:42

## 2021-01-05 RX ADMIN — Medication 10 ML: at 12:57

## 2021-01-05 RX ADMIN — ONDANSETRON 8 MG: 2 INJECTION INTRAMUSCULAR; INTRAVENOUS at 10:41

## 2021-01-05 RX ADMIN — DOCETAXEL 167 MG: 10 INJECTION, SOLUTION INTRAVENOUS at 11:15

## 2021-01-05 RX ADMIN — Medication 10 ML: at 10:20

## 2021-01-05 RX ADMIN — SODIUM CHLORIDE 500 ML: 900 INJECTION, SOLUTION INTRAVENOUS at 10:45

## 2021-01-05 NOTE — PROGRESS NOTES
1/5/2021 Saw the patient with Dr Luna Clark. She is due for her last cycle of TC. She is scheduled to see radiation on the 14th for consult. We will see her back after radiation. Will continue to follow.

## 2021-01-05 NOTE — PROGRESS NOTES
Arrived to the Infusion Center.  Taxotere/Cytoxan completed. Patient tolerated well.   Any issues or concerns during appointment: no.  Patient aware of next infusion appointment on 1/6/2021 (date) at 4 pm (time).  Discharged ambulatory with family member.

## 2021-01-06 ENCOUNTER — HOSPITAL ENCOUNTER (OUTPATIENT)
Dept: INFUSION THERAPY | Age: 71
Discharge: HOME | End: 2021-01-06
Payer: COMMERCIAL

## 2021-01-06 VITALS
TEMPERATURE: 97.1 F | RESPIRATION RATE: 18 BRPM | HEART RATE: 81 BPM | DIASTOLIC BLOOD PRESSURE: 60 MMHG | SYSTOLIC BLOOD PRESSURE: 132 MMHG | OXYGEN SATURATION: 98 %

## 2021-01-06 DIAGNOSIS — C50.911 MALIGNANT NEOPLASM OF RIGHT BREAST IN FEMALE, ESTROGEN RECEPTOR NEGATIVE, UNSPECIFIED SITE OF BREAST (HCC): Primary | ICD-10-CM

## 2021-01-06 DIAGNOSIS — Z17.1 MALIGNANT NEOPLASM OF RIGHT BREAST IN FEMALE, ESTROGEN RECEPTOR NEGATIVE, UNSPECIFIED SITE OF BREAST (HCC): Primary | ICD-10-CM

## 2021-01-06 PROCEDURE — 74011250636 HC RX REV CODE- 250/636: Performed by: INTERNAL MEDICINE

## 2021-01-06 PROCEDURE — 96372 THER/PROPH/DIAG INJ SC/IM: CPT

## 2021-01-06 RX ADMIN — PEGFILGRASTIM-CBQV 6 MG: 6 INJECTION, SOLUTION SUBCUTANEOUS at 16:10

## 2021-01-06 NOTE — PROGRESS NOTES
Arrived to the [facility]. Udenyca injection completed. Patient tolerated well. Any issues or concerns during appointment: No.  Discharged home in stable condition.

## 2021-01-14 ENCOUNTER — HOSPITAL ENCOUNTER (OUTPATIENT)
Dept: RADIATION ONCOLOGY | Age: 71
Discharge: HOME | End: 2021-01-14
Payer: COMMERCIAL

## 2021-01-14 ENCOUNTER — HOSPITAL ENCOUNTER (OUTPATIENT)
Dept: INFUSION THERAPY | Age: 71
Discharge: HOME | End: 2021-01-14
Payer: COMMERCIAL

## 2021-01-14 ENCOUNTER — APPOINTMENT (OUTPATIENT)
Dept: RADIATION ONCOLOGY | Age: 71
End: 2021-01-14
Payer: COMMERCIAL

## 2021-01-14 VITALS
HEART RATE: 105 BPM | SYSTOLIC BLOOD PRESSURE: 141 MMHG | DIASTOLIC BLOOD PRESSURE: 69 MMHG | TEMPERATURE: 98.2 F | OXYGEN SATURATION: 98 %

## 2021-01-14 DIAGNOSIS — T45.1X5A CHEMOTHERAPY INDUCED DIARRHEA: ICD-10-CM

## 2021-01-14 DIAGNOSIS — T45.1X5A CHEMOTHERAPY INDUCED DIARRHEA: Primary | ICD-10-CM

## 2021-01-14 DIAGNOSIS — K52.1 CHEMOTHERAPY INDUCED DIARRHEA: Primary | ICD-10-CM

## 2021-01-14 DIAGNOSIS — K52.1 CHEMOTHERAPY INDUCED DIARRHEA: ICD-10-CM

## 2021-01-14 DIAGNOSIS — Z17.1 MALIGNANT NEOPLASM OF RIGHT BREAST IN FEMALE, ESTROGEN RECEPTOR NEGATIVE, UNSPECIFIED SITE OF BREAST (HCC): ICD-10-CM

## 2021-01-14 DIAGNOSIS — C50.911 MALIGNANT NEOPLASM OF RIGHT BREAST IN FEMALE, ESTROGEN RECEPTOR NEGATIVE, UNSPECIFIED SITE OF BREAST (HCC): ICD-10-CM

## 2021-01-14 LAB
ALBUMIN SERPL-MCNC: 3.7 G/DL (ref 3.2–4.6)
ALBUMIN/GLOB SERPL: 1.1 {RATIO} (ref 1.2–3.5)
ALP SERPL-CCNC: 84 U/L (ref 50–136)
ALT SERPL-CCNC: 46 U/L (ref 12–65)
ANION GAP SERPL CALC-SCNC: 10 MMOL/L (ref 7–16)
AST SERPL-CCNC: 34 U/L (ref 15–37)
BASOPHILS # BLD: 0.1 K/UL (ref 0–0.2)
BASOPHILS NFR BLD: 1 % (ref 0–2)
BILIRUB SERPL-MCNC: 0.6 MG/DL (ref 0.2–1.1)
BUN SERPL-MCNC: 11 MG/DL (ref 8–23)
CALCIUM SERPL-MCNC: 9.7 MG/DL (ref 8.3–10.4)
CHLORIDE SERPL-SCNC: 103 MMOL/L (ref 98–107)
CO2 SERPL-SCNC: 23 MMOL/L (ref 21–32)
CREAT SERPL-MCNC: 1.1 MG/DL (ref 0.6–1)
DIFFERENTIAL METHOD BLD: ABNORMAL
EOSINOPHIL # BLD: 0 K/UL (ref 0–0.8)
EOSINOPHIL NFR BLD: 0 % (ref 0.5–7.8)
ERYTHROCYTE [DISTWIDTH] IN BLOOD BY AUTOMATED COUNT: 15.6 % (ref 11.9–14.6)
GLOBULIN SER CALC-MCNC: 3.5 G/DL (ref 2.3–3.5)
GLUCOSE SERPL-MCNC: 98 MG/DL (ref 65–100)
HCT VFR BLD AUTO: 38 % (ref 35.8–46.3)
HGB BLD-MCNC: 12.3 G/DL (ref 11.7–15.4)
IMM GRANULOCYTES # BLD AUTO: 1 K/UL (ref 0–0.5)
IMM GRANULOCYTES NFR BLD AUTO: 9 % (ref 0–5)
LYMPHOCYTES # BLD: 1.3 K/UL (ref 0.5–4.6)
LYMPHOCYTES NFR BLD: 12 % (ref 13–44)
MCH RBC QN AUTO: 29.7 PG (ref 26.1–32.9)
MCHC RBC AUTO-ENTMCNC: 32.4 G/DL (ref 31.4–35)
MCV RBC AUTO: 91.8 FL (ref 79.6–97.8)
MONOCYTES # BLD: 1 K/UL (ref 0.1–1.3)
MONOCYTES NFR BLD: 9 % (ref 4–12)
NEUTS SEG # BLD: 7.8 K/UL (ref 1.7–8.2)
NEUTS SEG NFR BLD: 69 % (ref 43–78)
NRBC # BLD: 0.05 K/UL (ref 0–0.2)
PLATELET # BLD AUTO: 226 K/UL (ref 150–450)
PLATELET COMMENTS,PCOM: ADEQUATE
PMV BLD AUTO: 11.2 FL (ref 9.4–12.3)
POTASSIUM SERPL-SCNC: 3.8 MMOL/L (ref 3.5–5.1)
PROT SERPL-MCNC: 7.2 G/DL (ref 6.3–8.2)
RBC # BLD AUTO: 4.14 M/UL (ref 4.05–5.25)
RBC MORPH BLD: ABNORMAL
SODIUM SERPL-SCNC: 136 MMOL/L (ref 136–145)
WBC # BLD AUTO: 11.2 K/UL (ref 4.3–11.1)
WBC MORPH BLD: ABNORMAL

## 2021-01-14 PROCEDURE — 74011250636 HC RX REV CODE- 250/636: Performed by: NURSE PRACTITIONER

## 2021-01-14 PROCEDURE — 80053 COMPREHEN METABOLIC PANEL: CPT

## 2021-01-14 PROCEDURE — 99211 OFF/OP EST MAY X REQ PHY/QHP: CPT

## 2021-01-14 PROCEDURE — 85025 COMPLETE CBC W/AUTO DIFF WBC: CPT

## 2021-01-14 PROCEDURE — 96360 HYDRATION IV INFUSION INIT: CPT

## 2021-01-14 RX ORDER — LEVOTHYROXINE SODIUM 100 UG/1
TABLET ORAL
COMMUNITY

## 2021-01-14 RX ORDER — SODIUM CHLORIDE 9 MG/ML
INJECTION, SOLUTION INTRAVENOUS ONCE
Status: COMPLETED | OUTPATIENT
Start: 2021-01-14 | End: 2021-01-14

## 2021-01-14 RX ORDER — SODIUM CHLORIDE 0.9 % (FLUSH) 0.9 %
10 SYRINGE (ML) INJECTION AS NEEDED
Status: DISCONTINUED | OUTPATIENT
Start: 2021-01-14 | End: 2021-01-16 | Stop reason: HOSPADM

## 2021-01-14 RX ADMIN — Medication 10 ML: at 12:08

## 2021-01-14 RX ADMIN — SODIUM CHLORIDE 1000 ML: 900 INJECTION, SOLUTION INTRAVENOUS at 12:09

## 2021-01-14 NOTE — CONSULTS
Patient: Shane Molsey MRN: [id number]  SSN: [id number]    YOB: 1950  Age: 79 y.o. Sex: female      Other Providers:  Dr. Luis Jung and Dr. Rafael Pino: fatigue    DIAGNOSIS: pT1c pN0 R breast IDC, triple negative (h/o L breast IDC)    PREVIOUS TREATMENT:  1. 2001: L lumpectomy, adjuvant chemo and adjuvant RT  2. 10/05/2020: R lumpectomy and SLN bx  3. 01/05/2020: Completed TC x 4    HISTORY OF PRESENT ILLNESS:  Shane Mosley is a 79 y.o. female who I am seeing at the request of Dr. Luis Jung. Her oncologic history dates back to 2001 when she was diagnosed with L breast IDC. She underwent BCS, chemo and RT (records being obtained from [facility]). On routine mammogram in Sept, she was found to have an abnormality on the right in the 2:00 position, 7 cm from the nipple. Biopsy was done and this revealed IDC, triple negative. She then underwent lumpectomy and SLN bx with pathology revealing IDC measuring 1.4 cm, high grade. LVSI was noted and medial margin was < 1 mm. SLN bx revealed 0/2 LNs involved. Given her high risk status, she underwent 4 cycles of TC chemo. Symptomatically, she has fatigue but notes no range of motion issues or pain.     PAST MEDICAL HISTORY:    Past Medical History:   Diagnosis Date    Arthritis     osteo    Breast cancer ([facility] [address] 2001    had chemo and radiation for breast cancer in left breast    Breast cancer ([facility] [address] dx 9/17/20    right breast-- lumpectomy/ plans for chemo and radiation--- followed by dr Aditya Altamirano   [address] [place] [address] Chronic kidney disease     stage 3 kidney disease--- followed by dr. Kathryn Peter GERD (gastroesophageal reflux disease)      prn meds    Hypercholesterolemia     Hypertension     controlled with med    Nausea & vomiting     post op n/v ~even with colonoscopy-- BUT NONE WITH 10/2020 SURG ON BREAST    Thyroid disease     hypo       PAST SURGICAL HISTORY:   Past Surgical History:   Procedure Laterality Date    HX BREAST BIOPSY Right 09/17/2020    U/S  HX BREAST LUMPECTOMY Left     HX CATARACT REMOVAL Bilateral 2018    HX  SECTION      ,     HX COLONOSCOPY      HX MASTECTOMY Right 10/5/2020    RIGHT WIRE LOC PARTIAL MASTECTOMY performed by Cindy Fuentes MD at [address]:     Current Outpatient Medications:     levothyroxine (Synthroid) 100 mcg tablet, Take  by mouth Daily (before breakfast). , Disp: , Rfl:     ergocalciferol (ERGOCALCIFEROL) 1,250 mcg (50,000 unit) capsule, TK 1 C PO 1 TIME WEEKLY, Disp: , Rfl:     psyllium (METAMUCIL) powd, Take 1 Cap by mouth daily as needed. 1 tsp in 8 oz water , Disp: , Rfl:     triamcinolone acetonide (KENALOG) 0.025 % topical cream, Apply  to affected area two (2) times a day. use thin layer on affected areas (Patient taking differently: Apply  to affected area two (2) times a day. use thin layer on affected areas to bilateral hands and to surrounding skin around port site ), Disp: 15 g, Rfl: 3    silver sulfADIAZINE (SILVADENE) 1 % topical cream, Apply  to affected area daily. (Patient taking differently: Apply 1 g to affected area daily. Apply externally to the affected area daily - genitalia ), Disp: 50 g, Rfl: 1    glucosamine/chondr huynh A sod (OSTEO BI-FLEX PO), Take 2 Tabs by mouth daily. , Disp: , Rfl:     ondansetron hcl (Zofran) 8 mg tablet, Take 1 Tab by mouth every eight (8) hours as needed for Nausea. Indications: prevent nausea and vomiting from cancer chemotherapy, Disp: 90 Tab, Rfl: 2    calcium-cholecalciferol, d3, (CALCIUM 600 + D) 600-125 mg-unit tab, Take 1 Tab by mouth nightly., Disp: , Rfl:     famotidine (PEPCID) 20 mg tablet, Take 20 mg by mouth daily as needed for Gastroesophageal Reflux Disease (GERD), Heartburn or Indigestion. Take / use AM day of surgery  per anesthesia protocols. Indications: gastroesophageal reflux disease, Disp: , Rfl:     quinapriL (ACCUPRIL) 20 mg tablet, Take 20 mg by mouth daily.  Indications: high blood pressure, Disp: , Rfl:     rosuvastatin (CRESTOR) 10 mg tablet, Take 10 mg by mouth Three (3) times a week. Takes M/W/Sat ~at bedtime  Indications: high cholesterol, Disp: , Rfl:   No current facility-administered medications for this encounter.      Facility-Administered Medications Ordered in Other Encounters:     central line flush (saline) syringe 10 mL, 10 mL, InterCATHeter, PRN, Freeman Robert MD, 10 mL at 01/14/21 1208    ALLERGIES:   Allergies   Allergen Reactions    Nsaids (Non-Steroidal Anti-Inflammatory Drug) Other (comments)     Can not have because of CKD       SOCIAL HISTORY:   Social History     Socioeconomic History    Marital status:      Spouse name: Not on file    Number of children: Not on file    Years of education: Not on file    Highest education level: Not on file   Occupational History    Not on file   Social Needs    Financial resource strain: Not on file    Food insecurity     Worry: Not on file     Inability: Not on file    Transportation needs     Medical: Not on file     Non-medical: Not on file   Tobacco Use    Smoking status: Never Smoker    Smokeless tobacco: Never Used   Substance and Sexual Activity    Alcohol use: Never     Frequency: Never    Drug use: Never    Sexual activity: Not on file   Lifestyle    Physical activity     Days per week: Not on file     Minutes per session: Not on file    Stress: Not on file   Relationships    Social connections     Talks on phone: Not on file     Gets together: Not on file     Attends Latter-day service: Not on file     Active member of club or organization: Not on file     Attends meetings of clubs or organizations: Not on file     Relationship status: Not on file    Intimate partner violence     Fear of current or ex partner: Not on file     Emotionally abused: Not on file     Physically abused: Not on file     Forced sexual activity: Not on file   Other Topics Concern    Not on file   Social History Narrative    Not on file       FAMILY HISTORY:   Family History   Problem Relation Age of Onset    Heart Disease Mother     Cancer Father         prostate     Cancer Brother         prostate    Cancer Paternal Aunt         leukemia     Cancer Paternal Uncle         leukemia     Cancer Paternal Uncle         prostate    Cancer Paternal Uncle         pancreatic    Cancer Cousin         ovarian    Cancer Cousin         lung       REVIEW OF SYSTEMS: Please see the completed review of systems sheet in the chart that I have reviewed today.       PHYSICAL EXAMINATION:   ECOG Performance status 1  VITAL SIGNS:   Visit Vitals  BP (!) 141/69 (BP 1 Location: Right arm, BP Patient Position: Sitting)   Pulse (!) 105   Temp 98.2 °F (36.8 °C)   SpO2 98%      General: well developed/nourished adult Female in no acute distress; appears stated age  [de-identified]: normocephalic, atraumatic; EOMI  Neck: supple with full ROM; no cervical lymphadenopathy  Respiratory: normal inspiratory effort, no audible wheezes  Extremities: no cyanosis, clubbing, or edema  Musculoskeletal: mobility intact x4; normal ROM in all joints  Skin: no skin lesions identified  Neuro: AOx3; sensation intact x 4; CNII-XII grossly intact  Psych: appropriate affect, insight, and judgement  GI: abdomen soft, non-distended  Breast: no masses palpated; scar well-healed    PATHOLOGY:    Pathology report reviewed - see HPI    LABORATORY:   Lab Results   Component Value Date/Time    Sodium 140 01/05/2021 08:36 AM    Potassium 4.2 01/05/2021 08:36 AM    Chloride 108 (H) 01/05/2021 08:36 AM    CO2 23 01/05/2021 08:36 AM    Anion gap 9 01/05/2021 08:36 AM    Glucose 167 (H) 01/05/2021 08:36 AM    BUN 11 01/05/2021 08:36 AM    Creatinine 1.10 (H) 01/05/2021 08:36 AM    GFR est AA >60 01/05/2021 08:36 AM    GFR est non-AA 52 (L) 01/05/2021 08:36 AM    Calcium 9.1 01/05/2021 08:36 AM    Magnesium 2.2 01/05/2021 08:36 AM    Albumin 3.3 01/05/2021 08:36 AM    Protein, total 6.4 01/05/2021 08:36 AM Globulin 3.1 01/05/2021 08:36 AM    A-G Ratio 1.1 (L) 01/05/2021 08:36 AM    ALT (SGPT) 27 01/05/2021 08:36 AM     Lab Results   Component Value Date/Time    WBC 9.9 01/05/2021 08:36 AM    HGB 11.0 (L) 01/05/2021 08:36 AM    HCT 34.8 (L) 01/05/2021 08:36 AM    PLATELET 082 08/52/4241 08:36 AM       RADIOLOGY:    Xr Chest Port    Result Date: 10/29/2020  EXAMINATION: CHEST RADIOGRAPH 10/29/2020 3:52 PM INDICATION: Postoperative radiograph for port placement COMPARISON: None TECHNIQUE: A single view of the chest was obtained. FINDINGS: Support Devices: Left subclavian chest port terminates at the cavoatrial junction. Osseous : No acute abnormality. Cardiomediastinal Silhouette: Normal in size. Mitral annular calcification noted. Lungs: Clear lungs. Normal pulmonary vascularity. Pleura: No pleural fluid or pneumothorax. Upper Abdomen: No abnormality. IMPRESSION: Left chest port terminates at the cavoatrial junction. IMPRESSION:  Akua Cardenas is a 79 y.o. female with Stage I R breast IDC, triple negative. PLAN:    Adjuvant RT is recommended given she had BCS. Simulation is set up for 1/27 with RT to start soon thereafter. Will try to obtain RT records to ensure no overlap with prior RT field.     Autumn Montanez MD   January 14, 2021

## 2021-01-27 ENCOUNTER — HOSPITAL ENCOUNTER (OUTPATIENT)
Dept: RADIATION ONCOLOGY | Age: 71
Discharge: HOME | End: 2021-01-27
Payer: COMMERCIAL

## 2021-01-27 PROCEDURE — 77290 THER RAD SIMULAJ FIELD CPLX: CPT

## 2021-01-27 PROCEDURE — 77470 SPECIAL RADIATION TREATMENT: CPT

## 2021-01-27 PROCEDURE — 77332 RADIATION TREATMENT AID(S): CPT

## 2021-02-03 ENCOUNTER — HOSPITAL ENCOUNTER (OUTPATIENT)
Dept: RADIATION ONCOLOGY | Age: 71
Discharge: HOME | End: 2021-02-03
Payer: MEDICARE

## 2021-02-03 PROCEDURE — 77300 RADIATION THERAPY DOSE PLAN: CPT

## 2021-02-03 PROCEDURE — 77334 RADIATION TREATMENT AID(S): CPT

## 2021-02-03 PROCEDURE — 77295 3-D RADIOTHERAPY PLAN: CPT

## 2021-02-17 ENCOUNTER — HOSPITAL ENCOUNTER (OUTPATIENT)
Dept: RADIATION ONCOLOGY | Age: 71
Discharge: HOME | End: 2021-02-17
Payer: MEDICARE

## 2021-02-17 PROCEDURE — 77280 THER RAD SIMULAJ FIELD SMPL: CPT

## 2021-02-17 PROCEDURE — 77412 RADIATION TX DELIVERY LVL 3: CPT

## 2021-02-18 ENCOUNTER — HOSPITAL ENCOUNTER (OUTPATIENT)
Dept: RADIATION ONCOLOGY | Age: 71
Discharge: HOME | End: 2021-02-18
Payer: MEDICARE

## 2021-02-18 PROCEDURE — 77412 RADIATION TX DELIVERY LVL 3: CPT

## 2021-02-19 ENCOUNTER — HOSPITAL ENCOUNTER (OUTPATIENT)
Dept: RADIATION ONCOLOGY | Age: 71
Discharge: HOME | End: 2021-02-19
Payer: MEDICARE

## 2021-02-19 PROCEDURE — 77412 RADIATION TX DELIVERY LVL 3: CPT

## 2021-02-22 ENCOUNTER — HOSPITAL ENCOUNTER (OUTPATIENT)
Dept: RADIATION ONCOLOGY | Age: 71
Discharge: HOME | End: 2021-02-22
Payer: MEDICARE

## 2021-02-22 PROCEDURE — 77412 RADIATION TX DELIVERY LVL 3: CPT

## 2021-02-23 ENCOUNTER — HOSPITAL ENCOUNTER (OUTPATIENT)
Dept: RADIATION ONCOLOGY | Age: 71
Discharge: HOME | End: 2021-02-23
Payer: MEDICARE

## 2021-02-23 VITALS
DIASTOLIC BLOOD PRESSURE: 65 MMHG | TEMPERATURE: 96.6 F | OXYGEN SATURATION: 97 % | SYSTOLIC BLOOD PRESSURE: 135 MMHG | HEART RATE: 93 BPM | BODY MASS INDEX: 38.36 KG/M2 | WEIGHT: 230.5 LBS

## 2021-02-23 PROCEDURE — 77412 RADIATION TX DELIVERY LVL 3: CPT

## 2021-02-23 NOTE — PROGRESS NOTES
Patient: Martine Rabago MRN: [id number]  SSN: [id number]    YOB: 1950  Age: 70 y.o. Sex: female      DIAGNOSIS:  pT1c pN0 R breast IDC, triple negative (h/o L breast IDC)    TREATMENT SITE:  R breast    DOSE and FRACTIONATION:  5 of 20 fractions; 1335 cGy of 5005 cGy    INTERVAL HISTORY:  Martine Rabago is a 70 y.o. female being treated for breast cancer. Week 1: no complaints    OBJECTIVE:  NAD  Visit Vitals  /65   Pulse 93   Temp (!) 96.6 °F (35.9 °C)   Wt 104.6 kg (230 lb 8 oz)   SpO2 97%   BMI 38.36 kg/m²       Lab Results   Component Value Date/Time    Sodium 136 01/14/2021 12:11 PM    Potassium 3.8 01/14/2021 12:11 PM    Chloride 103 01/14/2021 12:11 PM    CO2 23 01/14/2021 12:11 PM    Anion gap 10 01/14/2021 12:11 PM    Glucose 98 01/14/2021 12:11 PM    BUN 11 01/14/2021 12:11 PM    Creatinine 1.10 (H) 01/14/2021 12:11 PM    GFR est AA >60 01/14/2021 12:11 PM    GFR est non-AA 52 (L) 01/14/2021 12:11 PM    Calcium 9.7 01/14/2021 12:11 PM    Magnesium 2.2 01/05/2021 08:36 AM    Albumin 3.7 01/14/2021 12:11 PM    Protein, total 7.2 01/14/2021 12:11 PM    Globulin 3.5 01/14/2021 12:11 PM    A-G Ratio 1.1 (L) 01/14/2021 12:11 PM    ALT (SGPT) 46 01/14/2021 12:11 PM     Lab Results   Component Value Date/Time    WBC 11.2 (H) 01/14/2021 12:11 PM    HGB 12.3 01/14/2021 12:11 PM    HCT 38.0 01/14/2021 12:11 PM    PLATELET 446 91/34/5934 12:11 PM       ASSESSMENT and PLAN:  Martine Rabago is tolerating radiation as anticipated for the current dose and fraction. We will continue on as planned with another treatment visit anticipated next week.         Merissa Panda MD   February 23, 2021

## 2021-02-24 ENCOUNTER — HOSPITAL ENCOUNTER (OUTPATIENT)
Dept: RADIATION ONCOLOGY | Age: 71
Discharge: HOME | End: 2021-02-24
Payer: MEDICARE

## 2021-02-24 PROCEDURE — 77412 RADIATION TX DELIVERY LVL 3: CPT

## 2021-02-24 PROCEDURE — 77336 RADIATION PHYSICS CONSULT: CPT

## 2021-02-24 PROCEDURE — 77417 THER RADIOLOGY PORT IMAGE(S): CPT

## 2021-02-25 ENCOUNTER — HOSPITAL ENCOUNTER (OUTPATIENT)
Dept: RADIATION ONCOLOGY | Age: 71
Discharge: HOME | End: 2021-02-25
Payer: MEDICARE

## 2021-02-25 PROCEDURE — 77412 RADIATION TX DELIVERY LVL 3: CPT

## 2021-02-26 ENCOUNTER — HOSPITAL ENCOUNTER (OUTPATIENT)
Dept: RADIATION ONCOLOGY | Age: 71
Discharge: HOME | End: 2021-02-26
Payer: MEDICARE

## 2021-02-26 PROCEDURE — 77412 RADIATION TX DELIVERY LVL 3: CPT

## 2021-03-02 ENCOUNTER — PATIENT OUTREACH (OUTPATIENT)
Dept: CASE MANAGEMENT | Age: 71
End: 2021-03-02

## 2021-03-02 ENCOUNTER — HOSPITAL ENCOUNTER (OUTPATIENT)
Dept: INFUSION THERAPY | Age: 71
Discharge: HOME | End: 2021-03-02
Payer: COMMERCIAL

## 2021-03-02 ENCOUNTER — HOSPITAL ENCOUNTER (OUTPATIENT)
Dept: RADIATION ONCOLOGY | Age: 71
Discharge: HOME | End: 2021-03-02
Payer: COMMERCIAL

## 2021-03-02 VITALS
TEMPERATURE: 96.5 F | SYSTOLIC BLOOD PRESSURE: 139 MMHG | BODY MASS INDEX: 38.31 KG/M2 | OXYGEN SATURATION: 97 % | RESPIRATION RATE: 16 BRPM | HEART RATE: 100 BPM | DIASTOLIC BLOOD PRESSURE: 61 MMHG | WEIGHT: 230.2 LBS

## 2021-03-02 DIAGNOSIS — Z17.1 MALIGNANT NEOPLASM OF RIGHT BREAST IN FEMALE, ESTROGEN RECEPTOR NEGATIVE, UNSPECIFIED SITE OF BREAST (HCC): ICD-10-CM

## 2021-03-02 DIAGNOSIS — C50.911 MALIGNANT NEOPLASM OF RIGHT BREAST IN FEMALE, ESTROGEN RECEPTOR NEGATIVE, UNSPECIFIED SITE OF BREAST (HCC): ICD-10-CM

## 2021-03-02 LAB
ALBUMIN SERPL-MCNC: 3.3 G/DL (ref 3.2–4.6)
ALBUMIN/GLOB SERPL: 0.9 {RATIO} (ref 1.2–3.5)
ALP SERPL-CCNC: 67 U/L (ref 50–136)
ALT SERPL-CCNC: 20 U/L (ref 12–65)
ANION GAP SERPL CALC-SCNC: 7 MMOL/L (ref 7–16)
AST SERPL-CCNC: 14 U/L (ref 15–37)
BASOPHILS # BLD: 0 K/UL (ref 0–0.2)
BASOPHILS NFR BLD: 1 % (ref 0–2)
BILIRUB SERPL-MCNC: 0.4 MG/DL (ref 0.2–1.1)
BUN SERPL-MCNC: 6 MG/DL (ref 8–23)
CALCIUM SERPL-MCNC: 8.8 MG/DL (ref 8.3–10.4)
CHLORIDE SERPL-SCNC: 108 MMOL/L (ref 98–107)
CO2 SERPL-SCNC: 26 MMOL/L (ref 21–32)
CREAT SERPL-MCNC: 0.9 MG/DL (ref 0.6–1)
DIFFERENTIAL METHOD BLD: ABNORMAL
EOSINOPHIL # BLD: 0.2 K/UL (ref 0–0.8)
EOSINOPHIL NFR BLD: 4 % (ref 0.5–7.8)
ERYTHROCYTE [DISTWIDTH] IN BLOOD BY AUTOMATED COUNT: 13.2 % (ref 11.9–14.6)
GLOBULIN SER CALC-MCNC: 3.6 G/DL (ref 2.3–3.5)
GLUCOSE SERPL-MCNC: 96 MG/DL (ref 65–100)
HCT VFR BLD AUTO: 37 % (ref 35.8–46.3)
HGB BLD-MCNC: 11.3 G/DL (ref 11.7–15.4)
IMM GRANULOCYTES # BLD AUTO: 0 K/UL (ref 0–0.5)
IMM GRANULOCYTES NFR BLD AUTO: 0 % (ref 0–5)
LYMPHOCYTES # BLD: 0.7 K/UL (ref 0.5–4.6)
LYMPHOCYTES NFR BLD: 15 % (ref 13–44)
MCH RBC QN AUTO: 29.5 PG (ref 26.1–32.9)
MCHC RBC AUTO-ENTMCNC: 30.5 G/DL (ref 31.4–35)
MCV RBC AUTO: 96.6 FL (ref 79.6–97.8)
MONOCYTES # BLD: 0.5 K/UL (ref 0.1–1.3)
MONOCYTES NFR BLD: 10 % (ref 4–12)
NEUTS SEG # BLD: 3.5 K/UL (ref 1.7–8.2)
NEUTS SEG NFR BLD: 70 % (ref 43–78)
NRBC # BLD: 0 K/UL (ref 0–0.2)
PLATELET # BLD AUTO: 166 K/UL (ref 150–450)
PMV BLD AUTO: 11 FL (ref 9.4–12.3)
POTASSIUM SERPL-SCNC: 3.9 MMOL/L (ref 3.5–5.1)
PROT SERPL-MCNC: 6.9 G/DL (ref 6.3–8.2)
RBC # BLD AUTO: 3.83 M/UL (ref 4.05–5.25)
SODIUM SERPL-SCNC: 141 MMOL/L (ref 136–145)
WBC # BLD AUTO: 5 K/UL (ref 4.3–11.1)

## 2021-03-02 PROCEDURE — 36591 DRAW BLOOD OFF VENOUS DEVICE: CPT

## 2021-03-02 PROCEDURE — 85025 COMPLETE CBC W/AUTO DIFF WBC: CPT

## 2021-03-02 PROCEDURE — 77412 RADIATION TX DELIVERY LVL 3: CPT

## 2021-03-02 PROCEDURE — 80053 COMPREHEN METABOLIC PANEL: CPT

## 2021-03-02 PROCEDURE — 74011250636 HC RX REV CODE- 250/636: Performed by: INTERNAL MEDICINE

## 2021-03-02 RX ORDER — SODIUM CHLORIDE 0.9 % (FLUSH) 0.9 %
5 SYRINGE (ML) INJECTION AS NEEDED
Status: DISCONTINUED | OUTPATIENT
Start: 2021-03-02 | End: 2021-03-04 | Stop reason: HOSPADM

## 2021-03-02 RX ORDER — HEPARIN 100 UNIT/ML
300 SYRINGE INTRAVENOUS AS NEEDED
Status: DISCONTINUED | OUTPATIENT
Start: 2021-03-02 | End: 2021-03-04 | Stop reason: HOSPADM

## 2021-03-02 RX ADMIN — Medication 5 ML: at 10:52

## 2021-03-02 RX ADMIN — HEPARIN 300 UNITS: 100 SYRINGE at 11:03

## 2021-03-02 NOTE — PROGRESS NOTES
3/2/2021 Saw the patient with Dr Susana Marroquin. She states she still feels weak. She has about 3 more weeks of radiation. She still has some nail changes. Her fingers still have numbness in the tips and when she is in bed at night she feels like her toes are numb. We encouraged her that time will improve the neuropathy. Will continue to follow.

## 2021-03-02 NOTE — PROGRESS NOTES
Patient: Bette Evans MRN: [id number]  SSN: [id number]    YOB: 1950  Age: 70 y.o. Sex: female      DIAGNOSIS:  pT1c pN0 R breast IDC, triple negative (h/o L breast IDC)    TREATMENT SITE:  R breast    DOSE and FRACTIONATION:  10 of 20 fractions; 2670 cGy of 5005 cGy    INTERVAL HISTORY:  Bette Evans is a 70 y.o. female being treated for breast cancer. Week 1: no complaints  Week 2: no complaints    OBJECTIVE:  NAD  Visit Vitals  /61 (BP 1 Location: Left upper arm, BP Patient Position: Sitting)   Pulse 100   Temp (!) 96.5 °F (35.8 °C)   Resp 16   Wt 104.4 kg (230 lb 3.2 oz)   SpO2 97%   BMI 38.31 kg/m²       Lab Results   Component Value Date/Time    Sodium 141 03/02/2021 08:39 AM    Potassium 3.9 03/02/2021 08:39 AM    Chloride 108 (H) 03/02/2021 08:39 AM    CO2 26 03/02/2021 08:39 AM    Anion gap 7 03/02/2021 08:39 AM    Glucose 96 03/02/2021 08:39 AM    BUN 6 (L) 03/02/2021 08:39 AM    Creatinine 0.90 03/02/2021 08:39 AM    GFR est AA >60 03/02/2021 08:39 AM    GFR est non-AA >60 03/02/2021 08:39 AM    Calcium 8.8 03/02/2021 08:39 AM    Magnesium 2.2 01/05/2021 08:36 AM    Albumin 3.3 03/02/2021 08:39 AM    Protein, total 6.9 03/02/2021 08:39 AM    Globulin 3.6 (H) 03/02/2021 08:39 AM    A-G Ratio 0.9 (L) 03/02/2021 08:39 AM    ALT (SGPT) 20 03/02/2021 08:39 AM     Lab Results   Component Value Date/Time    WBC 5.0 03/02/2021 08:39 AM    HGB 11.3 (L) 03/02/2021 08:39 AM    HCT 37.0 03/02/2021 08:39 AM    PLATELET 356 29/53/7825 08:39 AM       ASSESSMENT and PLAN:  Bette Evans is tolerating radiation as anticipated for the current dose and fraction. We will continue on as planned with another treatment visit anticipated next week.         Ramírez Stringer MD   March 2, 2021

## 2021-03-04 ENCOUNTER — HOSPITAL ENCOUNTER (OUTPATIENT)
Dept: RADIATION ONCOLOGY | Age: 71
Discharge: HOME | End: 2021-03-04
Payer: COMMERCIAL

## 2021-03-04 PROCEDURE — 77412 RADIATION TX DELIVERY LVL 3: CPT

## 2021-03-05 ENCOUNTER — HOSPITAL ENCOUNTER (OUTPATIENT)
Dept: RADIATION ONCOLOGY | Age: 71
Discharge: HOME | End: 2021-03-05
Payer: COMMERCIAL

## 2021-03-05 PROCEDURE — 77412 RADIATION TX DELIVERY LVL 3: CPT

## 2021-03-08 ENCOUNTER — HOSPITAL ENCOUNTER (OUTPATIENT)
Dept: RADIATION ONCOLOGY | Age: 71
Discharge: HOME | End: 2021-03-08
Payer: COMMERCIAL

## 2021-03-08 PROCEDURE — 77412 RADIATION TX DELIVERY LVL 3: CPT

## 2021-03-09 ENCOUNTER — HOSPITAL ENCOUNTER (OUTPATIENT)
Dept: RADIATION ONCOLOGY | Age: 71
Discharge: HOME | End: 2021-03-09
Payer: COMMERCIAL

## 2021-03-09 VITALS
BODY MASS INDEX: 37.94 KG/M2 | HEART RATE: 109 BPM | OXYGEN SATURATION: 97 % | DIASTOLIC BLOOD PRESSURE: 59 MMHG | TEMPERATURE: 98.4 F | SYSTOLIC BLOOD PRESSURE: 147 MMHG | WEIGHT: 228 LBS

## 2021-03-09 PROCEDURE — 77280 THER RAD SIMULAJ FIELD SMPL: CPT

## 2021-03-09 PROCEDURE — 77412 RADIATION TX DELIVERY LVL 3: CPT

## 2021-03-09 NOTE — PROGRESS NOTES
Patient: Bette Evans MRN: [id number]  SSN: [id number]    YOB: 1950  Age: 70 y.o. Sex: female      DIAGNOSIS:  pT1c pN0 R breast IDC, triple negative (h/o L breast IDC)    TREATMENT SITE:  R breast    DOSE and FRACTIONATION:  15 of 20 fractions; 4005 cGy of 5005 cGy    INTERVAL HISTORY:  Bette Evans is a 70 y.o. female being treated for breast cancer. Week 1: no complaints  Week 2: no complaints  Week 3: notes some fatigue    OBJECTIVE:  NAD  Visit Vitals  BP (!) 147/59   Pulse (!) 109   Temp 98.4 °F (36.9 °C)   Wt 103.4 kg (228 lb)   SpO2 97%   BMI 37.94 kg/m²       Lab Results   Component Value Date/Time    Sodium 141 03/02/2021 08:39 AM    Potassium 3.9 03/02/2021 08:39 AM    Chloride 108 (H) 03/02/2021 08:39 AM    CO2 26 03/02/2021 08:39 AM    Anion gap 7 03/02/2021 08:39 AM    Glucose 96 03/02/2021 08:39 AM    BUN 6 (L) 03/02/2021 08:39 AM    Creatinine 0.90 03/02/2021 08:39 AM    GFR est AA >60 03/02/2021 08:39 AM    GFR est non-AA >60 03/02/2021 08:39 AM    Calcium 8.8 03/02/2021 08:39 AM    Magnesium 2.2 01/05/2021 08:36 AM    Albumin 3.3 03/02/2021 08:39 AM    Protein, total 6.9 03/02/2021 08:39 AM    Globulin 3.6 (H) 03/02/2021 08:39 AM    A-G Ratio 0.9 (L) 03/02/2021 08:39 AM    ALT (SGPT) 20 03/02/2021 08:39 AM     Lab Results   Component Value Date/Time    WBC 5.0 03/02/2021 08:39 AM    HGB 11.3 (L) 03/02/2021 08:39 AM    HCT 37.0 03/02/2021 08:39 AM    PLATELET 459 08/28/9256 08:39 AM       ASSESSMENT and PLAN:  Bette Needs is tolerating radiation as anticipated for the current dose and fraction. We will continue on as planned with another treatment visit anticipated next week.         Ramírez Stringer MD   March 9, 2021

## 2021-03-10 ENCOUNTER — HOSPITAL ENCOUNTER (OUTPATIENT)
Dept: RADIATION ONCOLOGY | Age: 71
Discharge: HOME | End: 2021-03-10
Payer: COMMERCIAL

## 2021-03-10 PROCEDURE — 77336 RADIATION PHYSICS CONSULT: CPT

## 2021-03-10 PROCEDURE — 77412 RADIATION TX DELIVERY LVL 3: CPT

## 2021-03-11 ENCOUNTER — HOSPITAL ENCOUNTER (OUTPATIENT)
Dept: RADIATION ONCOLOGY | Age: 71
Discharge: HOME | End: 2021-03-11
Payer: COMMERCIAL

## 2021-03-11 PROCEDURE — 77412 RADIATION TX DELIVERY LVL 3: CPT

## 2021-03-12 ENCOUNTER — HOSPITAL ENCOUNTER (OUTPATIENT)
Dept: RADIATION ONCOLOGY | Age: 71
Discharge: HOME | End: 2021-03-12
Payer: COMMERCIAL

## 2021-03-12 PROCEDURE — 77412 RADIATION TX DELIVERY LVL 3: CPT

## 2021-03-15 ENCOUNTER — HOSPITAL ENCOUNTER (OUTPATIENT)
Dept: RADIATION ONCOLOGY | Age: 71
Discharge: HOME | End: 2021-03-15
Payer: COMMERCIAL

## 2021-03-15 DIAGNOSIS — C50.211 MALIGNANT NEOPLASM OF UPPER-INNER QUADRANT OF RIGHT FEMALE BREAST, UNSPECIFIED ESTROGEN RECEPTOR STATUS (HCC): Primary | ICD-10-CM

## 2021-03-15 PROCEDURE — 77412 RADIATION TX DELIVERY LVL 3: CPT

## 2021-03-16 ENCOUNTER — HOSPITAL ENCOUNTER (OUTPATIENT)
Dept: RADIATION ONCOLOGY | Age: 71
Discharge: HOME | End: 2021-03-16
Payer: COMMERCIAL

## 2021-03-16 VITALS
OXYGEN SATURATION: 98 % | TEMPERATURE: 97.2 F | HEART RATE: 100 BPM | DIASTOLIC BLOOD PRESSURE: 78 MMHG | SYSTOLIC BLOOD PRESSURE: 148 MMHG

## 2021-03-16 PROCEDURE — 77412 RADIATION TX DELIVERY LVL 3: CPT

## 2021-03-16 PROCEDURE — 77336 RADIATION PHYSICS CONSULT: CPT

## 2021-03-16 NOTE — PROGRESS NOTES
Patient: Bran Marrero MRN: [id number]  SSN: [id number]    YOB: 1950  Age: 70 y.o. Sex: female      DIAGNOSIS:  pT1c pN0 R breast IDC, triple negative (h/o L breast IDC)    TREATMENT SITE:  R breast    DOSE and FRACTIONATION:  20 of 20 fractions; 5005 cGy of 5005 cGy    INTERVAL HISTORY:  Bran Marrero is a 70 y.o. female being treated for breast cancer. Week 1: no complaints  Week 2: no complaints  Week 3: notes some fatigue  Week 4: some skin irritation    OBJECTIVE:  NAD; gr 3 dermatitis under R breast and R axilla  Visit Vitals  BP (!) 148/78 (BP 1 Location: Left upper arm, BP Patient Position: Sitting)   Pulse 100   Temp 97.2 °F (36.2 °C)   SpO2 98%       Lab Results   Component Value Date/Time    Sodium 141 03/02/2021 08:39 AM    Potassium 3.9 03/02/2021 08:39 AM    Chloride 108 (H) 03/02/2021 08:39 AM    CO2 26 03/02/2021 08:39 AM    Anion gap 7 03/02/2021 08:39 AM    Glucose 96 03/02/2021 08:39 AM    BUN 6 (L) 03/02/2021 08:39 AM    Creatinine 0.90 03/02/2021 08:39 AM    GFR est AA >60 03/02/2021 08:39 AM    GFR est non-AA >60 03/02/2021 08:39 AM    Calcium 8.8 03/02/2021 08:39 AM    Magnesium 2.2 01/05/2021 08:36 AM    Albumin 3.3 03/02/2021 08:39 AM    Protein, total 6.9 03/02/2021 08:39 AM    Globulin 3.6 (H) 03/02/2021 08:39 AM    A-G Ratio 0.9 (L) 03/02/2021 08:39 AM    ALT (SGPT) 20 03/02/2021 08:39 AM     Lab Results   Component Value Date/Time    WBC 5.0 03/02/2021 08:39 AM    HGB 11.3 (L) 03/02/2021 08:39 AM    HCT 37.0 03/02/2021 08:39 AM    PLATELET 946 46/04/4288 08:39 AM       ASSESSMENT and PLAN:  Bran Marrero is tolerating radiation as anticipated for the current dose and fraction. Skin check in 2 weeks. Mammogram and f/u in 6 mos.         Oralia Alvarez MD   March 16, 2021

## 2021-03-31 ENCOUNTER — HOSPITAL ENCOUNTER (OUTPATIENT)
Dept: RADIATION ONCOLOGY | Age: 71
Discharge: HOME | End: 2021-03-31
Payer: COMMERCIAL

## 2021-06-21 ENCOUNTER — PATIENT OUTREACH (OUTPATIENT)
Dept: CASE MANAGEMENT | Age: 71
End: 2021-06-21

## 2021-06-21 ENCOUNTER — HOSPITAL ENCOUNTER (OUTPATIENT)
Dept: LAB | Age: 71
Discharge: HOME | End: 2021-06-21
Payer: MEDICARE

## 2021-06-21 DIAGNOSIS — C50.911 MALIGNANT NEOPLASM OF RIGHT BREAST IN FEMALE, ESTROGEN RECEPTOR NEGATIVE, UNSPECIFIED SITE OF BREAST (HCC): ICD-10-CM

## 2021-06-21 DIAGNOSIS — Z17.1 MALIGNANT NEOPLASM OF RIGHT BREAST IN FEMALE, ESTROGEN RECEPTOR NEGATIVE, UNSPECIFIED SITE OF BREAST (HCC): ICD-10-CM

## 2021-06-21 LAB
ALBUMIN SERPL-MCNC: 3.5 G/DL (ref 3.2–4.6)
ALBUMIN/GLOB SERPL: 0.9 {RATIO} (ref 1.2–3.5)
ALP SERPL-CCNC: 79 U/L (ref 50–136)
ALT SERPL-CCNC: 24 U/L (ref 12–65)
ANION GAP SERPL CALC-SCNC: 4 MMOL/L (ref 7–16)
AST SERPL-CCNC: 21 U/L (ref 15–37)
BASOPHILS # BLD: 0 K/UL (ref 0–0.2)
BASOPHILS NFR BLD: 1 % (ref 0–2)
BILIRUB SERPL-MCNC: 0.4 MG/DL (ref 0.2–1.1)
BUN SERPL-MCNC: 11 MG/DL (ref 8–23)
CALCIUM SERPL-MCNC: 9.3 MG/DL (ref 8.3–10.4)
CANCER AG15-3 SERPL-ACNC: 9.7 U/ML (ref 1–35)
CHLORIDE SERPL-SCNC: 111 MMOL/L (ref 98–107)
CO2 SERPL-SCNC: 25 MMOL/L (ref 21–32)
CREAT SERPL-MCNC: 1.2 MG/DL (ref 0.6–1)
DIFFERENTIAL METHOD BLD: NORMAL
EOSINOPHIL # BLD: 0.2 K/UL (ref 0–0.8)
EOSINOPHIL NFR BLD: 3 % (ref 0.5–7.8)
ERYTHROCYTE [DISTWIDTH] IN BLOOD BY AUTOMATED COUNT: 14.6 % (ref 11.9–14.6)
GLOBULIN SER CALC-MCNC: 3.9 G/DL (ref 2.3–3.5)
GLUCOSE SERPL-MCNC: 122 MG/DL (ref 65–100)
HCT VFR BLD AUTO: 39.8 % (ref 35.8–46.3)
HGB BLD-MCNC: 13.1 G/DL (ref 11.7–15.4)
IMM GRANULOCYTES # BLD AUTO: 0 K/UL (ref 0–0.5)
IMM GRANULOCYTES NFR BLD AUTO: 0 % (ref 0–5)
LYMPHOCYTES # BLD: 1.3 K/UL (ref 0.5–4.6)
LYMPHOCYTES NFR BLD: 22 % (ref 13–44)
MCH RBC QN AUTO: 29 PG (ref 26.1–32.9)
MCHC RBC AUTO-ENTMCNC: 32.9 G/DL (ref 31.4–35)
MCV RBC AUTO: 88.2 FL (ref 79.6–97.8)
MONOCYTES # BLD: 0.3 K/UL (ref 0.1–1.3)
MONOCYTES NFR BLD: 5 % (ref 4–12)
NEUTS SEG # BLD: 4.1 K/UL (ref 1.7–8.2)
NEUTS SEG NFR BLD: 69 % (ref 43–78)
NRBC # BLD: 0 K/UL (ref 0–0.2)
PLATELET # BLD AUTO: 159 K/UL (ref 150–450)
PMV BLD AUTO: 11 FL (ref 9.4–12.3)
POTASSIUM SERPL-SCNC: 3.9 MMOL/L (ref 3.5–5.1)
PROT SERPL-MCNC: 7.4 G/DL (ref 6.3–8.2)
RBC # BLD AUTO: 4.51 M/UL (ref 4.05–5.2)
SODIUM SERPL-SCNC: 140 MMOL/L (ref 136–145)
WBC # BLD AUTO: 5.9 K/UL (ref 4.3–11.1)

## 2021-06-21 PROCEDURE — 80053 COMPREHEN METABOLIC PANEL: CPT

## 2021-06-21 PROCEDURE — 85025 COMPLETE CBC W/AUTO DIFF WBC: CPT

## 2021-06-21 PROCEDURE — 36415 COLL VENOUS BLD VENIPUNCTURE: CPT

## 2021-06-21 PROCEDURE — 86300 IMMUNOASSAY TUMOR CA 15-3: CPT

## 2021-06-21 NOTE — PROGRESS NOTES
6/21/2021 Saw the patient with Dr Luis E Moscoso. She is doing well. She does have some periodic breast pain and we discussed that the more time away from radiation this should improve. We discussed the treatment summary and this will be mailed to her. Navigation will sign off.

## 2021-09-15 ENCOUNTER — HOSPITAL ENCOUNTER (OUTPATIENT)
Dept: MAMMOGRAPHY | Age: 71
Discharge: HOME | End: 2021-09-15
Attending: RADIOLOGY
Payer: MEDICARE

## 2021-09-15 DIAGNOSIS — C50.211 MALIGNANT NEOPLASM OF UPPER-INNER QUADRANT OF RIGHT FEMALE BREAST, UNSPECIFIED ESTROGEN RECEPTOR STATUS (HCC): ICD-10-CM

## 2021-09-15 PROCEDURE — 77066 DX MAMMO INCL CAD BI: CPT

## 2021-10-25 ENCOUNTER — HOSPITAL ENCOUNTER (OUTPATIENT)
Dept: LAB | Age: 71
Discharge: HOME | End: 2021-10-25
Payer: MEDICARE

## 2021-10-25 DIAGNOSIS — C50.911 MALIGNANT NEOPLASM OF RIGHT BREAST IN FEMALE, ESTROGEN RECEPTOR NEGATIVE, UNSPECIFIED SITE OF BREAST (HCC): ICD-10-CM

## 2021-10-25 DIAGNOSIS — Z17.1 MALIGNANT NEOPLASM OF RIGHT BREAST IN FEMALE, ESTROGEN RECEPTOR NEGATIVE, UNSPECIFIED SITE OF BREAST (HCC): ICD-10-CM

## 2021-10-25 LAB
ALBUMIN SERPL-MCNC: 3.4 G/DL (ref 3.2–4.6)
ALBUMIN/GLOB SERPL: 0.8 {RATIO} (ref 1.2–3.5)
ALP SERPL-CCNC: 98 U/L (ref 50–136)
ALT SERPL-CCNC: 47 U/L (ref 12–65)
ANION GAP SERPL CALC-SCNC: 5 MMOL/L (ref 7–16)
AST SERPL-CCNC: 48 U/L (ref 15–37)
BASOPHILS # BLD: 0 K/UL (ref 0–0.2)
BASOPHILS NFR BLD: 0 % (ref 0–2)
BILIRUB SERPL-MCNC: 0.4 MG/DL (ref 0.2–1.1)
BUN SERPL-MCNC: 13 MG/DL (ref 8–23)
CALCIUM SERPL-MCNC: 9.3 MG/DL (ref 8.3–10.4)
CANCER AG15-3 SERPL-ACNC: 7.4 U/ML (ref 1–35)
CHLORIDE SERPL-SCNC: 110 MMOL/L (ref 98–107)
CO2 SERPL-SCNC: 26 MMOL/L (ref 21–32)
CREAT SERPL-MCNC: 1.1 MG/DL (ref 0.6–1)
DIFFERENTIAL METHOD BLD: ABNORMAL
EOSINOPHIL # BLD: 0.2 K/UL (ref 0–0.8)
EOSINOPHIL NFR BLD: 4 % (ref 0.5–7.8)
ERYTHROCYTE [DISTWIDTH] IN BLOOD BY AUTOMATED COUNT: 13.4 % (ref 11.9–14.6)
GLOBULIN SER CALC-MCNC: 4.4 G/DL (ref 2.3–3.5)
GLUCOSE SERPL-MCNC: 120 MG/DL (ref 65–100)
HCT VFR BLD AUTO: 40.6 % (ref 35.8–46.3)
HGB BLD-MCNC: 13 G/DL (ref 11.7–15.4)
IMM GRANULOCYTES # BLD AUTO: 0 K/UL (ref 0–0.5)
IMM GRANULOCYTES NFR BLD AUTO: 0 % (ref 0–5)
LYMPHOCYTES # BLD: 1.4 K/UL (ref 0.5–4.6)
LYMPHOCYTES NFR BLD: 27 % (ref 13–44)
MCH RBC QN AUTO: 30 PG (ref 26.1–32.9)
MCHC RBC AUTO-ENTMCNC: 32 G/DL (ref 31.4–35)
MCV RBC AUTO: 93.5 FL (ref 79.6–97.8)
MONOCYTES # BLD: 0.3 K/UL (ref 0.1–1.3)
MONOCYTES NFR BLD: 6 % (ref 4–12)
NEUTS SEG # BLD: 3.3 K/UL (ref 1.7–8.2)
NEUTS SEG NFR BLD: 63 % (ref 43–78)
NRBC # BLD: 0 K/UL (ref 0–0.2)
PLATELET # BLD AUTO: 142 K/UL (ref 150–450)
PMV BLD AUTO: 11.3 FL (ref 9.4–12.3)
POTASSIUM SERPL-SCNC: 4 MMOL/L (ref 3.5–5.1)
PROT SERPL-MCNC: 7.8 G/DL (ref 6.3–8.2)
RBC # BLD AUTO: 4.34 M/UL (ref 4.05–5.2)
SODIUM SERPL-SCNC: 141 MMOL/L (ref 136–145)
WBC # BLD AUTO: 5.2 K/UL (ref 4.3–11.1)

## 2021-10-25 PROCEDURE — 86300 IMMUNOASSAY TUMOR CA 15-3: CPT

## 2021-10-25 PROCEDURE — 36415 COLL VENOUS BLD VENIPUNCTURE: CPT

## 2021-10-25 PROCEDURE — 80053 COMPREHEN METABOLIC PANEL: CPT

## 2021-10-25 PROCEDURE — 85025 COMPLETE CBC W/AUTO DIFF WBC: CPT

## 2021-10-27 ENCOUNTER — HOSPITAL ENCOUNTER (OUTPATIENT)
Dept: INTERVENTIONAL RADIOLOGY/VASCULAR | Age: 71
Discharge: HOME | End: 2021-10-27
Attending: INTERNAL MEDICINE
Payer: MEDICARE

## 2021-10-27 VITALS
TEMPERATURE: 98 F | DIASTOLIC BLOOD PRESSURE: 54 MMHG | SYSTOLIC BLOOD PRESSURE: 119 MMHG | BODY MASS INDEX: 39.32 KG/M2 | HEIGHT: 65 IN | OXYGEN SATURATION: 99 % | WEIGHT: 236 LBS | RESPIRATION RATE: 18 BRPM | HEART RATE: 89 BPM

## 2021-10-27 DIAGNOSIS — Z17.1 MALIGNANT NEOPLASM OF UPPER-INNER QUADRANT OF RIGHT BREAST IN FEMALE, ESTROGEN RECEPTOR NEGATIVE (HCC): ICD-10-CM

## 2021-10-27 DIAGNOSIS — C50.211 MALIGNANT NEOPLASM OF UPPER-INNER QUADRANT OF RIGHT BREAST IN FEMALE, ESTROGEN RECEPTOR NEGATIVE (HCC): ICD-10-CM

## 2021-10-27 PROCEDURE — 74011000250 HC RX REV CODE- 250: Performed by: PHYSICIAN ASSISTANT

## 2021-10-27 PROCEDURE — 77030031139 HC SUT VCRL2 J&J -A

## 2021-10-27 PROCEDURE — 77030031131 HC SUT MXN P COVD -B

## 2021-10-27 PROCEDURE — 36590 REMOVAL TUNNELED CV CATH: CPT

## 2021-10-27 PROCEDURE — 77030010507 HC ADH SKN DERMBND J&J -B

## 2021-10-27 RX ORDER — LIDOCAINE HYDROCHLORIDE AND EPINEPHRINE 10; 10 MG/ML; UG/ML
1-20 INJECTION, SOLUTION INFILTRATION; PERINEURAL
Status: DISCONTINUED | OUTPATIENT
Start: 2021-10-27 | End: 2021-10-31 | Stop reason: HOSPADM

## 2021-10-27 RX ADMIN — LIDOCAINE HYDROCHLORIDE,EPINEPHRINE BITARTRATE 70 MG: 10; .01 INJECTION, SOLUTION INFILTRATION; PERINEURAL at 13:25

## 2021-10-27 NOTE — PROGRESS NOTES
Pt to IR Suite 2 via stretcher with RN.       IR Nurse Pre-Procedure Checklist Part 2          Consent signed: Yes    H&P complete:  Not applicable    Antibiotics: Not applicable    Airway/Mallampati Done: Not applicable    Shaved: Not applicable    Pregnancy Form:Not applicable    Patient Position: Yes    MD Side: Yes     Biopsy Worksheet: Not applicable    Specimen Medium: Not applicable

## 2021-10-27 NOTE — DISCHARGE INSTRUCTIONS
[address]  Department of Interventional Radiology  [facility]  [phone] Office  [phone] Fax    DRAIN/PORT/CATHETER REMOVAL  DISCHARGE INSTRUCTIONS    General Information:     Your doctor has ordered for us to remove your drain, port, or catheter. This could be that you do not need it anymore, it is not doing its job, your physician has decided on another plan for your treatment and/or it may need replacing. Home Care Instructions: You can resume your regular diet and medication regimen. Do not drink alcohol, drive, or make any important legal decisions in the next 24 hours. Do not lift anything heavier than a gallon of milk, or do anything strenuous for the next 24 hours. You will notice a dressing over the site of the removal. This dressing should stay in place until the site is healed. The dressing should be changed at least every 48 hours. You should change the dressing sooner if it becomes soiled or wet. The nurse who discharges you to home should review with you any wound care instructions. Resume your normal level of activity slowly. You may shower after 24 hours, but do not take a bath, swim or immerse yourself in water until the site has healed, and keep the dressing dry with plastic wrap while showering. The site may ooze for a couple of days. This drainage should lessen with each passing day. Call If:     You should call your Physician and/or the Radiology Nurse if you have any bleeding other than a small spot on your bandage. Call if you have any signs of infection, fever, or increased pain at the site of the tube. Call if the oozing increases, if it changes color, or begins to have an odor. Follow-Up Instructions: Please see your ordering doctor as he/she has requested. To Reach Us: If you have any questions about your procedure, please call the Interventional Radiology department at [phone].  After business hours (5pm) and weekends, call the answering service at [phone] and ask for the Radiologist on call to be paged. Si tiene Preguntas acerca del procedimiento, por favor llame al departamento de Radiología Intervencional al [phone]. Después de horas de oficina (5 pm) y los fines de [place], llamar al [employer] [facility] [employer] [place] al [phone] y pregunte por el Radiologo de Alpesh Boo. Interventional Radiology General Nurse Discharge    After general anesthesia or intravenous sedation, for 24 hours or while taking prescription Narcotics:  · Limit your activities  · Do not drive and operate hazardous machinery  · Do not make important personal or business decisions  · Do  not drink alcoholic beverages  · If you have not urinated within 8 hours after discharge, please contact your surgeon on call. * Please give a list of your current medications to your Primary Care Provider. * Please update this list whenever your medications are discontinued, doses are     changed, or new medications (including over-the-counter products) are added. * Please carry medication information at all times in case of emergency situations. These are general instructions for a healthy lifestyle:    No smoking/ No tobacco products/ Avoid exposure to second hand smoke  Surgeon General's Warning:  Quitting smoking now greatly reduces serious risk to your health. Obesity, smoking, and sedentary lifestyle greatly increases your risk for illness  A healthy diet, regular physical exercise & weight monitoring are important for maintaining a healthy lifestyle    You may be retaining fluid if you have a history of heart failure or if you experience any of the following symptoms:  Weight gain of 3 pounds or more overnight or 5 pounds in a week, increased swelling in our hands or feet or shortness of breath while lying flat in bed.   Please call your doctor as soon as you notice any of these symptoms; do not wait until your next office visit. Recognize signs and symptoms of STROKE:  F-face looks uneven    A-arms unable to move or move unevenly    S-speech slurred or non-existent    T-time-call 911 as soon as signs and symptoms begin-DO NOT go       Back to bed or wait to see if you get better-TIME IS BRAIN.

## 2022-02-28 ENCOUNTER — HOSPITAL ENCOUNTER (OUTPATIENT)
Dept: LAB | Age: 72
Discharge: HOME | End: 2022-02-28
Payer: MEDICARE

## 2022-02-28 DIAGNOSIS — C50.211 MALIGNANT NEOPLASM OF UPPER-INNER QUADRANT OF RIGHT BREAST IN FEMALE, ESTROGEN RECEPTOR NEGATIVE (HCC): ICD-10-CM

## 2022-02-28 DIAGNOSIS — Z17.1 MALIGNANT NEOPLASM OF UPPER-INNER QUADRANT OF RIGHT BREAST IN FEMALE, ESTROGEN RECEPTOR NEGATIVE (HCC): ICD-10-CM

## 2022-02-28 LAB
ALBUMIN SERPL-MCNC: 3.4 G/DL (ref 3.2–4.6)
ALBUMIN/GLOB SERPL: 0.8 {RATIO} (ref 1.2–3.5)
ALP SERPL-CCNC: 94 U/L (ref 50–136)
ALT SERPL-CCNC: 40 U/L (ref 12–65)
ANION GAP SERPL CALC-SCNC: 5 MMOL/L (ref 7–16)
AST SERPL-CCNC: 52 U/L (ref 15–37)
BASOPHILS # BLD: 0 K/UL (ref 0–0.2)
BASOPHILS NFR BLD: 1 % (ref 0–2)
BILIRUB SERPL-MCNC: 0.5 MG/DL (ref 0.2–1.1)
BUN SERPL-MCNC: 8 MG/DL (ref 8–23)
CALCIUM SERPL-MCNC: 9.5 MG/DL (ref 8.3–10.4)
CANCER AG15-3 SERPL-ACNC: 11.2 U/ML (ref 1–35)
CHLORIDE SERPL-SCNC: 108 MMOL/L (ref 98–107)
CO2 SERPL-SCNC: 25 MMOL/L (ref 21–32)
CREAT SERPL-MCNC: 1 MG/DL (ref 0.6–1)
DIFFERENTIAL METHOD BLD: ABNORMAL
EOSINOPHIL # BLD: 0.2 K/UL (ref 0–0.8)
EOSINOPHIL NFR BLD: 3 % (ref 0.5–7.8)
ERYTHROCYTE [DISTWIDTH] IN BLOOD BY AUTOMATED COUNT: 13.6 % (ref 11.9–14.6)
GLOBULIN SER CALC-MCNC: 4.3 G/DL (ref 2.3–3.5)
GLUCOSE SERPL-MCNC: 80 MG/DL (ref 65–100)
HCT VFR BLD AUTO: 42.5 % (ref 35.8–46.3)
HGB BLD-MCNC: 13.4 G/DL (ref 11.7–15.4)
IMM GRANULOCYTES # BLD AUTO: 0 K/UL (ref 0–0.5)
IMM GRANULOCYTES NFR BLD AUTO: 0 % (ref 0–5)
LYMPHOCYTES # BLD: 1.8 K/UL (ref 0.5–4.6)
LYMPHOCYTES NFR BLD: 27 % (ref 13–44)
MCH RBC QN AUTO: 29.5 PG (ref 26.1–32.9)
MCHC RBC AUTO-ENTMCNC: 31.5 G/DL (ref 31.4–35)
MCV RBC AUTO: 93.6 FL (ref 79.6–97.8)
MONOCYTES # BLD: 0.6 K/UL (ref 0.1–1.3)
MONOCYTES NFR BLD: 9 % (ref 4–12)
NEUTS SEG # BLD: 4 K/UL (ref 1.7–8.2)
NEUTS SEG NFR BLD: 61 % (ref 43–78)
NRBC # BLD: 0 K/UL (ref 0–0.2)
PLATELET # BLD AUTO: 135 K/UL (ref 150–450)
PMV BLD AUTO: 11 FL (ref 9.4–12.3)
POTASSIUM SERPL-SCNC: 4.2 MMOL/L (ref 3.5–5.1)
PROT SERPL-MCNC: 7.7 G/DL (ref 6.3–8.2)
RBC # BLD AUTO: 4.54 M/UL (ref 4.05–5.2)
SODIUM SERPL-SCNC: 138 MMOL/L (ref 136–145)
WBC # BLD AUTO: 6.6 K/UL (ref 4.3–11.1)

## 2022-02-28 PROCEDURE — 36415 COLL VENOUS BLD VENIPUNCTURE: CPT

## 2022-02-28 PROCEDURE — 85025 COMPLETE CBC W/AUTO DIFF WBC: CPT

## 2022-02-28 PROCEDURE — 86300 IMMUNOASSAY TUMOR CA 15-3: CPT

## 2022-02-28 PROCEDURE — 80053 COMPREHEN METABOLIC PANEL: CPT

## 2022-03-19 PROBLEM — Z17.1 MALIGNANT NEOPLASM OF RIGHT BREAST IN FEMALE, ESTROGEN RECEPTOR NEGATIVE (HCC): Status: ACTIVE | Noted: 2020-11-02

## 2022-03-19 PROBLEM — E66.01 SEVERE OBESITY (HCC): Status: ACTIVE | Noted: 2020-10-20

## 2022-03-19 PROBLEM — C50.911 MALIGNANT NEOPLASM OF RIGHT BREAST IN FEMALE, ESTROGEN RECEPTOR NEGATIVE (HCC): Status: ACTIVE | Noted: 2020-11-02

## 2022-05-02 DIAGNOSIS — C50.211 MALIGNANT NEOPLASM OF UPPER-INNER QUADRANT OF RIGHT FEMALE BREAST, UNSPECIFIED ESTROGEN RECEPTOR STATUS (HCC): Primary | ICD-10-CM

## 2022-05-02 DIAGNOSIS — Z17.1 MALIGNANT NEOPLASM OF RIGHT BREAST IN FEMALE, ESTROGEN RECEPTOR NEGATIVE, UNSPECIFIED SITE OF BREAST (HCC): Primary | ICD-10-CM

## 2022-05-02 DIAGNOSIS — Z17.1 ESTROGEN RECEPTOR NEGATIVE STATUS (ER-): ICD-10-CM

## 2022-05-02 DIAGNOSIS — C50.911 MALIGNANT NEOPLASM OF RIGHT BREAST IN FEMALE, ESTROGEN RECEPTOR NEGATIVE, UNSPECIFIED SITE OF BREAST (HCC): Primary | ICD-10-CM

## 2022-05-17 ENCOUNTER — HOSPITAL ENCOUNTER (OUTPATIENT)
Dept: LAB | Age: 72
Discharge: HOME | End: 2022-05-17

## 2022-05-17 PROCEDURE — 88305 TISSUE EXAM BY PATHOLOGIST: CPT

## 2022-05-17 PROCEDURE — 88313 SPECIAL STAINS GROUP 2: CPT

## 2022-06-22 NOTE — PATIENT INSTRUCTIONS
Patient Instructions from Today's Visit    Reason for Visit:  Follow up - Breast    Diagnosis Information:  [email or web address]    Plan:  - labs reviewed    Follow Up:  - 4 months    Recent Lab Results:  Hospital Outpatient Visit on 06/27/2022   Component Date Value Ref Range Status    WBC 06/27/2022 5.2  4.3 - 11.1 K/uL Final    RBC 06/27/2022 4.23  4.05 - 5.2 M/uL Final    Hemoglobin 06/27/2022 12.9  11.7 - 15.4 g/dL Final    Hematocrit 06/27/2022 40.2  35.8 - 46.3 % Final    MCV 06/27/2022 95.0  79.6 - 97.8 FL Final    MCH 06/27/2022 30.5  26.1 - 32.9 PG Final    MCHC 06/27/2022 32.1  31.4 - 35.0 g/dL Final    RDW 06/27/2022 13.5  11.9 - 14.6 % Final    Platelets 22/03/8288 119* 150 - 450 K/uL Final    MPV 06/27/2022 11.4  9.4 - 12.3 FL Final    nRBC 06/27/2022 0.00  0.0 - 0.2 K/uL Final    **Note: Absolute NRBC parameter is now reported with Hemogram**    Seg Neutrophils 06/27/2022 61  43 - 78 % Final    Lymphocytes 06/27/2022 28  13 - 44 % Final    Monocytes 06/27/2022 6  4.0 - 12.0 % Final    Eosinophils % 06/27/2022 4  0.5 - 7.8 % Final    Basophils 06/27/2022 1  0.0 - 2.0 % Final    Immature Granulocytes 06/27/2022 0  0.0 - 5.0 % Final    Segs Absolute 06/27/2022 3.2  1.7 - 8.2 K/UL Final    Absolute Lymph # 06/27/2022 1.5  0.5 - 4.6 K/UL Final    Absolute Mono # 06/27/2022 0.3  0.1 - 1.3 K/UL Final    Absolute Eos # 06/27/2022 0.2  0.0 - 0.8 K/UL Final    Basophils Absolute 06/27/2022 0.0  0.0 - 0.2 K/UL Final    Absolute Immature Granulocyte 06/27/2022 0.0  0.0 - 0.5 K/UL Final    Differential Type 06/27/2022 AUTOMATED    Final    Sodium 06/27/2022 142  136 - 145 mmol/L Final    Potassium 06/27/2022 4.2  3.5 - 5.1 mmol/L Final    Chloride 06/27/2022 111* 98 - 107 mmol/L Final    CO2 06/27/2022 25  21 - 32 mmol/L Final    Anion Gap 06/27/2022 6* 7 - 16 mmol/L Final    Glucose 06/27/2022 133* 65 - visit summary printout at the [facility] [address] desk.     Drake Nguyen RN

## 2022-06-27 ENCOUNTER — OFFICE VISIT (OUTPATIENT)
Dept: ONCOLOGY | Age: 72
End: 2022-06-27
Payer: MEDICARE

## 2022-06-27 ENCOUNTER — HOSPITAL ENCOUNTER (OUTPATIENT)
Dept: LAB | Age: 72
Discharge: HOME | End: 2022-06-30
Payer: MEDICARE

## 2022-06-27 VITALS
HEIGHT: 65 IN | TEMPERATURE: 97.8 F | DIASTOLIC BLOOD PRESSURE: 58 MMHG | SYSTOLIC BLOOD PRESSURE: 140 MMHG | WEIGHT: 232.3 LBS | RESPIRATION RATE: 24 BRPM | HEART RATE: 94 BPM | BODY MASS INDEX: 38.7 KG/M2 | OXYGEN SATURATION: 98 %

## 2022-06-27 DIAGNOSIS — Z17.1 ESTROGEN RECEPTOR NEGATIVE STATUS (ER-): ICD-10-CM

## 2022-06-27 DIAGNOSIS — C50.211 MALIGNANT NEOPLASM OF UPPER-INNER QUADRANT OF RIGHT FEMALE BREAST, UNSPECIFIED ESTROGEN RECEPTOR STATUS (HCC): ICD-10-CM

## 2022-06-27 DIAGNOSIS — C50.211 MALIGNANT NEOPLASM OF UPPER-INNER QUADRANT OF RIGHT FEMALE BREAST, UNSPECIFIED ESTROGEN RECEPTOR STATUS (HCC): Primary | ICD-10-CM

## 2022-06-27 LAB
ALBUMIN SERPL-MCNC: 3.3 G/DL (ref 3.2–4.6)
ALBUMIN/GLOB SERPL: 0.8 {RATIO} (ref 1.2–3.5)
ALP SERPL-CCNC: 89 U/L (ref 50–136)
ALT SERPL-CCNC: 32 U/L (ref 12–65)
ANION GAP SERPL CALC-SCNC: 6 MMOL/L (ref 7–16)
AST SERPL-CCNC: 34 U/L (ref 15–37)
BASOPHILS # BLD: 0 K/UL (ref 0–0.2)
BASOPHILS NFR BLD: 1 % (ref 0–2)
BILIRUB SERPL-MCNC: 0.5 MG/DL (ref 0.2–1.1)
BUN SERPL-MCNC: 8 MG/DL (ref 8–23)
CALCIUM SERPL-MCNC: 9.3 MG/DL (ref 8.3–10.4)
CANCER AG15-3 SERPL-ACNC: 10.6 U/ML (ref 1–35)
CHLORIDE SERPL-SCNC: 111 MMOL/L (ref 98–107)
CO2 SERPL-SCNC: 25 MMOL/L (ref 21–32)
CREAT SERPL-MCNC: 1.1 MG/DL (ref 0.6–1)
DIFFERENTIAL METHOD BLD: ABNORMAL
EOSINOPHIL # BLD: 0.2 K/UL (ref 0–0.8)
EOSINOPHIL NFR BLD: 4 % (ref 0.5–7.8)
ERYTHROCYTE [DISTWIDTH] IN BLOOD BY AUTOMATED COUNT: 13.5 % (ref 11.9–14.6)
GLOBULIN SER CALC-MCNC: 4.1 G/DL (ref 2.3–3.5)
GLUCOSE SERPL-MCNC: 133 MG/DL (ref 65–100)
HCT VFR BLD AUTO: 40.2 % (ref 35.8–46.3)
HGB BLD-MCNC: 12.9 G/DL (ref 11.7–15.4)
IMM GRANULOCYTES # BLD AUTO: 0 K/UL (ref 0–0.5)
IMM GRANULOCYTES NFR BLD AUTO: 0 % (ref 0–5)
LYMPHOCYTES # BLD: 1.5 K/UL (ref 0.5–4.6)
LYMPHOCYTES NFR BLD: 28 % (ref 13–44)
MCH RBC QN AUTO: 30.5 PG (ref 26.1–32.9)
MCHC RBC AUTO-ENTMCNC: 32.1 G/DL (ref 31.4–35)
MCV RBC AUTO: 95 FL (ref 79.6–97.8)
MONOCYTES # BLD: 0.3 K/UL (ref 0.1–1.3)
MONOCYTES NFR BLD: 6 % (ref 4–12)
NEUTS SEG # BLD: 3.2 K/UL (ref 1.7–8.2)
NEUTS SEG NFR BLD: 61 % (ref 43–78)
NRBC # BLD: 0 K/UL (ref 0–0.2)
PLATELET # BLD AUTO: 119 K/UL (ref 150–450)
PMV BLD AUTO: 11.4 FL (ref 9.4–12.3)
POTASSIUM SERPL-SCNC: 4.2 MMOL/L (ref 3.5–5.1)
PROT SERPL-MCNC: 7.4 G/DL (ref 6.3–8.2)
RBC # BLD AUTO: 4.23 M/UL (ref 4.05–5.2)
SODIUM SERPL-SCNC: 142 MMOL/L (ref 136–145)
WBC # BLD AUTO: 5.2 K/UL (ref 4.3–11.1)

## 2022-06-27 PROCEDURE — 36415 COLL VENOUS BLD VENIPUNCTURE: CPT

## 2022-06-27 PROCEDURE — 1123F ACP DISCUSS/DSCN MKR DOCD: CPT | Performed by: INTERNAL MEDICINE

## 2022-06-27 PROCEDURE — 99214 OFFICE O/P EST MOD 30 MIN: CPT | Performed by: INTERNAL MEDICINE

## 2022-06-27 PROCEDURE — 80053 COMPREHEN METABOLIC PANEL: CPT

## 2022-06-27 PROCEDURE — 85025 COMPLETE CBC W/AUTO DIFF WBC: CPT

## 2022-06-27 PROCEDURE — 86300 IMMUNOASSAY TUMOR CA 15-3: CPT

## 2022-06-27 RX ORDER — DULOXETIN HYDROCHLORIDE 30 MG/1
CAPSULE, DELAYED RELEASE ORAL
COMMUNITY
Start: 2022-04-27

## 2022-06-27 RX ORDER — TRAMADOL HYDROCHLORIDE 50 MG/1
TABLET ORAL
COMMUNITY
Start: 2022-06-06

## 2022-06-27 ASSESSMENT — PATIENT HEALTH QUESTIONNAIRE - PHQ9
1. LITTLE INTEREST OR PLEASURE IN DOING THINGS: 0
SUM OF ALL RESPONSES TO PHQ9 QUESTIONS 1 & 2: 0
SUM OF ALL RESPONSES TO PHQ QUESTIONS 1-9: 0
2. FEELING DOWN, DEPRESSED OR HOPELESS: 0

## 2022-06-27 NOTE — PROGRESS NOTES
[facility] Hematology and Oncology: Office Visit Established Patient    Chief Complaint:    Chief Complaint   Patient presents with    Follow-up         History of Present Illness:  Ms. Dorinda Donato is a 67 y.o. female who returns today for management of breast cancer. She has a history of left breast cancer treated in 2001, she underwent lumpectomy then adjuvant chemotherapy and radiation with [facility] (Dr. Loida Bartlett). She apparently was not followed by them long term and was  not on endocrine therapy at that time. She believes that the chemotherapy regimen including Taxotere but she cannot remember if she had Adriamycin. She believes she had 4 cycles and she remembers she was very sick on therapy. On 8/19/2020, she had  a routine diagnostic bilateral mammogram which reported a new right breast mass suspicious for malignancy. She underwent core needle biopsy of the mass with pathology showing infiltrating duct carcinoma, high-grade, ER/NH/HER-2 negative but additional  IHC confirmed mammary origin. On 10/5/2020, she underwent right partial mastectomy and right sentinel node biopsy by Dr. Xiomara Roberts. The surgical pathology confirmed invasive ductal carcinoma, high grade, 1.4 cm in greatest dimension. 2 sentinel nodes  were found to be negative for tumor. We recommended that she undergo adjuvant chemotherapy for risk reduction, but because of the possibility that she received anthracycline therapy in the past, we recommended assessment with TTE and an attempt to obtain  old records before deciding on specific regimens. We have been unable to locate records from [facility] which detail her previous regimen from 2001, but she has an old insurance approval letter that suggests that she did receive Adriamycin as part of her adjuvant  therapy.   Because we cannot be sure about her previous anthracycline exposure, and because the ABC meta-analysis only shows about 3% improvement in patients with anthracycline-containing regimens (likely an overestimate since this only included patients  T2 or higher), I recommend the use of Taxotere plus Cytoxan administered every three weeks for four cycles, with growth factor support. She completed 4 cycles of TC with her final dose in January 2021, she tolerated this reasonably well. She then went  on to complete adjuvant radiation in March 2021. She returns today for routine follow-up. For the most part she is doing very well. Her leg weakness is unchanged, but she remains active. She did not see onc rehab because she was told it would be for her lymphedema and not her leg weakness. Appetite is decreased, she has lost about 10 pounds but she wishes to lose more to help with her leg weakness. No nausea, vomiting, abdominal pain, other GI symptoms. No bone pain. There are no breast changes or concerns. Review of Systems:  Constitutional: Negative. HENT: Negative. Eyes: Negative. Respiratory: Negative. Cardiovascular: Negative. Gastrointestinal: Positive for decreased appetite. Genitourinary: Negative. Musculoskeletal: Positive for leg weakness. Skin: Negative. Neurological: Negative. Endo/Heme/Allergies: Negative. Psychiatric/Behavioral: Negative. All other systems reviewed and are negative.        No Known Allergies  Past Medical History:   Diagnosis Date    Arthritis     osteo    Breast cancer ([facility] [address] 2001    had chemo and radiation for breast cancer in left breast    Breast cancer ([facility] [address] dx 9/17/20    right breast-- lumpectomy/ plans for chemo and radiation--- followed by dr Sadiq Louis   [facility] Chronic kidney disease     stage 3 kidney disease--- followed by dr. Piedad Curran GERD (gastroesophageal reflux disease)      prn meds    Hypercholesterolemia     Hypertension     controlled with med    Nausea & vomiting     post op n/v     Thyroid disease     hypo     Past Surgical History:   Procedure Laterality Date    BREAST BIOPSY Right 09/17/2020    U/S    BREAST LUMPECTOMY Left     BREAST LUMPECTOMY Right 2020    CATARACT REMOVAL Bilateral 2018     SECTION      ,     COLONOSCOPY      IR REMOVE TUNNELED VAD W PORT  10/27/2021    MASTECTOMY Right 10/5/2020    RIGHT WIRE LOC PARTIAL MASTECTOMY performed by Zuleima Flores MD at [facility] RIGHT Right 2020    US BREAST NEEDLE BIOPSY RIGHT 2020 [facility]    US GUIDED NEEDLE LOC OF RIGHT BREAST Right 10/5/2020    US GUIDED NEEDLE LOC OF RIGHT BREAST 10/5/2020 [facility]     Family History   Problem Relation Age of Onset    Cancer Cousin         lung    Breast Cancer Neg Hx     Heart Disease Mother     Cancer Father         prostate     Cancer Cousin         ovarian    Cancer Paternal Aunt         leukemia     Cancer Paternal Uncle         leukemia     Cancer Paternal Uncle         prostate    Cancer Paternal Uncle         pancreatic    Cancer Brother         prostate     Social History     Socioeconomic History    Marital status:      Spouse name: Not on file    Number of children: Not on file    Years of education: Not on file    Highest education level: Not on file   Occupational History    Not on file   Tobacco Use    Smoking status: Never Smoker    Smokeless tobacco: Never Used   Substance and Sexual Activity    Alcohol use: Never    Drug use: Never    Sexual activity: Not on file   Other Topics Concern    Not on file   Social History Narrative    Not on file     Social Determinants of Health     Financial Resource Strain:     Difficulty of Paying Living Expenses: Not on file   Food Insecurity:     Worried About Running Out of Food in the Last Year: Not on file    Lupillo of Food in the Last Year: Not on file   Transportation Needs:     Lack of Transportation (Medical): Not on file    Lack of Transportation (Non-Medical):  Not on file   Physical Activity:     Days of Exercise per Week: Not on file    Minutes of Exercise per Session: Not on file   Stress:     Feeling of Stress : Not on file   Social Connections:     Frequency of Communication with Friends and Family: Not on file    Frequency of Social Gatherings with Friends and Family: Not on file    Attends Anabaptist Services: Not on file    Active Member of [address] [place] [address] or Organizations: Not on file    Attends Club or Organization Meetings: Not on file    Marital Status: Not on file   Intimate Partner Violence:     Fear of Current or Ex-Partner: Not on file    Emotionally Abused: Not on file    Physically Abused: Not on file    Sexually Abused: Not on file   Housing Stability:     Unable to Pay for Housing in the Last Year: Not on file    Number of [place] in the Last Year: Not on file    Unstable Housing in the Last Year: Not on file     Current Outpatient Medications   Medication Sig Dispense Refill    DULoxetine (CYMBALTA) 30 MG extended release capsule       traMADol (ULTRAM) 50 MG tablet TAKE 1 TABLET BY MOUTH THREE TIMES DAILY AS NEEDED FOR PAIN      Calcium Carbonate-Vitamin D (CALCIUM-VITAMIN D) 600-125 MG-UNIT TABS Take 1 tablet by mouth      ergocalciferol (ERGOCALCIFEROL) 1.25 MG (63289 UT) capsule TK 1 C PO 1 TIME WEEKLY      famotidine (PEPCID) 20 MG tablet Take 20 mg by mouth daily as needed      levothyroxine (SYNTHROID) 100 MCG tablet Take by mouth every morning (before breakfast)      ondansetron (ZOFRAN) 8 MG tablet Take 8 mg by mouth every 8 hours as needed      quinapril (ACCUPRIL) 20 MG tablet Take 20 mg by mouth daily      rosuvastatin (CRESTOR) 10 MG tablet Take 10 mg by mouth three times a week      silver sulfADIAZINE (SILVADENE) 1 % cream Apply topically daily      triamcinolone (KENALOG) 0.025 % cream Apply topically 2 times daily       No current facility-administered medications for this visit.        OBJECTIVE:  BP (!) 140/58 (Site: Left Upper Arm, Position: Standing, Cuff Size: Large Adult)   Pulse 94   Temp 97.8 °F (36.6 °C) (Oral)   Resp 24   Ht 5' 5\" (1.651 m)   Wt 232 lb 4.8 oz (105.4 kg)   SpO2 98%   Breastfeeding No   BMI 38.66 kg/m²     Physical Exam:  Constitutional: Well developed, well nourished female in no acute distress, sitting comfortably in the exam room chair. HEENT: Normocephalic and atraumatic. Sclerae anicteric. Neck supple without JVD. No thyromegaly present. Lymph node   No palpable submandibular, cervical, supraclavicular lymph nodes. Skin Warm and dry. No bruising and no rash noted. No erythema. No pallor. Respiratory Lungs are clear to auscultation bilaterally without wheezes, rales or rhonchi, normal air exchange without accessory muscle use. CVS Normal rate, regular rhythm and normal S1 and S2. No murmurs, gallops, or rubs. Abdomen Soft, nontender and nondistended, normoactive bowel sounds. No palpable mass. No hepatosplenomegaly. Neuro Grossly nonfocal with no obvious sensory or motor deficits. MSK Normal range of motion in general.  No edema and no tenderness. Psych Appropriate mood and affect.       Labs:  Recent Results (from the past 96 hour(s))   CBC with Auto Differential    Collection Time: 06/27/22  2:08 PM   Result Value Ref Range    WBC 5.2 4.3 - 11.1 K/uL    RBC 4.23 4.05 - 5.2 M/uL    Hemoglobin 12.9 11.7 - 15.4 g/dL    Hematocrit 40.2 35.8 - 46.3 %    MCV 95.0 79.6 - 97.8 FL    MCH 30.5 26.1 - 32.9 PG    MCHC 32.1 31.4 - 35.0 g/dL    RDW 13.5 11.9 - 14.6 %    Platelets 248 (L) 266 - 450 K/uL    MPV 11.4 9.4 - 12.3 FL    nRBC 0.00 0.0 - 0.2 K/uL    Seg Neutrophils 61 43 - 78 %    Lymphocytes 28 13 - 44 %    Monocytes 6 4.0 - 12.0 %    Eosinophils % 4 0.5 - 7.8 %    Basophils 1 0.0 - 2.0 %    Immature Granulocytes 0 0.0 - 5.0 %    Segs Absolute 3.2 1.7 - 8.2 K/UL    Absolute Lymph # 1.5 0.5 - 4.6 K/UL    Absolute Mono # 0.3 0.1 - 1.3 K/UL    Absolute Eos # 0.2 0.0 - 0.8 K/UL    Basophils Absolute 0.0 0.0 - 0.2 K/UL    Absolute Immature Granulocyte 0.0 0.0 - 0.5 K/UL    Differential Type AUTOMATED     Comprehensive Metabolic Panel    Collection Time: 06/27/22  2:08 PM   Result Value Ref Range    Sodium 142 136 - 145 mmol/L    Potassium 4.2 3.5 - 5.1 mmol/L    Chloride 111 (H) 98 - 107 mmol/L    CO2 25 21 - 32 mmol/L    Anion Gap 6 (L) 7 - 16 mmol/L    Glucose 133 (H) 65 - 100 mg/dL    BUN 8 8 - 23 MG/DL    CREATININE 1.10 (H) 0.6 - 1.0 MG/DL    GFR African American >60 >60 ml/min/1.73m2    GFR Non- 52 (L) >60 ml/min/1.73m2    Calcium 9.3 8.3 - 10.4 MG/DL    Total Bilirubin 0.5 0.2 - 1.1 MG/DL    ALT 32 12 - 65 U/L    AST 34 15 - 37 U/L    Alk Phosphatase 89 50 - 136 U/L    Total Protein 7.4 6.3 - 8.2 g/dL    Albumin 3.3 3.2 - 4.6 g/dL    Globulin 4.1 (H) 2.3 - 3.5 g/dL    Albumin/Globulin Ratio 0.8 (L) 1.2 - 3.5     Cancer Antigen 15-3    Collection Time: 06/27/22  2:08 PM   Result Value Ref Range    CA 15-3 10.60 1.0 - 35.0 U/mL       Imaging:  No results found. ASSESSMENT:   Diagnosis Orders   1. Malignant neoplasm of upper-inner quadrant of right female breast, unspecified estrogen receptor status ([facility] [address]  CBC with Auto Differential    Comprehensive Metabolic Panel    Cancer Antigen 15-3    CBC with Auto Differential    Comprehensive Metabolic Panel    Cancer Antigen 15-3         PLAN:  Lab studies were personally reviewed. Breast cancer: IDC, high grade, right breast, triple negative, s/p lumpectomy and sentinel node, tumor 1.4 cm with 2 nodes negative. [id number] (stage 1B)   Although her tumor was small and node negative, the triple negative histology greatly increases her risk of recurrence, as she will not have any benefit from adjuvant endocrine therapy. The only systemic  therapy that will lower her recurrence risk is cytotoxic therapy. Therefore, I recommend that chemotherapy be administered in the adjuvant setting for risk reduction.   Unfortunately, she has already had chemotherapy in 2001 for a contralateral breast  cancer, which was presumably triple negative as well based on her adjuvant therapy. If she already had Adriamycin, this limits our ability to use it in the current setting. We have been unable to locate records from [facility] which detail her previous regimen  from 2001, but she has an old insurance approval letter that suggests that she did receive Adriamycin as part of her adjuvant therapy. Because we cannot be sure about her previous anthracycline exposure, and because the ABC meta-analysis only shows about  3% improvement in patients with anthracycline-containing regimens (likely an overestimate since this only included patients T2 or higher), I recommend the use of Taxotere plus Cytoxan administered every three weeks for four cycles, with growth factor  support. She completed 4 cycles of TC with her final dose in January 2021, she tolerated this reasonably well. She then went on to complete adjuvant radiation in March 2021. She returns today for routine follow-up. For the most part she is doing very well. Her leg weakness is unchanged, but she remains active. She did not see onc rehab because she was told it would be for her lymphedema and not her leg weakness. Appetite is decreased, she has lost about 10 pounds but she wishes to lose more to help with her leg weakness. No nausea, vomiting, abdominal pain, other GI symptoms. No bone pain. There are no breast changes or concerns. Labs reviewed and stable, Ca 15-3 normal.  No evidence that her weakness or appetite is related to recurrent disease, we will continue observation alone, she was triple negative so there is no role for additional therapy at this time. She does not wish for formal exercise or therapy regimen, she will remain active on her own schedule. Repeat mammogram September 2021 negative, repeat September 2022 is already scheduled. All questions were asked and answered to the best of my ability.   F/u 4 months for observation,  repeat labs including Ca 15-3.           Jez Carlisle MD, MD  [address] [place] [address] Hematology and Oncology  [address] [place] [address]  Office : [phone]  Fax : [phone]

## 2022-08-31 ENCOUNTER — HOSPITAL ENCOUNTER (OUTPATIENT)
Dept: MRI IMAGING | Age: 72
Discharge: HOME | End: 2022-09-03
Payer: MEDICARE

## 2022-08-31 DIAGNOSIS — G45.9 TIA (TRANSIENT ISCHEMIC ATTACK): ICD-10-CM

## 2022-08-31 PROCEDURE — 70551 MRI BRAIN STEM W/O DYE: CPT

## 2022-09-28 ENCOUNTER — HOSPITAL ENCOUNTER (OUTPATIENT)
Dept: MAMMOGRAPHY | Age: 72
Discharge: HOME | End: 2022-10-01
Payer: MEDICARE

## 2022-09-28 DIAGNOSIS — C50.211 MALIGNANT NEOPLASM OF UPPER-INNER QUADRANT OF RIGHT FEMALE BREAST, UNSPECIFIED ESTROGEN RECEPTOR STATUS (HCC): ICD-10-CM

## 2022-09-28 DIAGNOSIS — Z17.1 ESTROGEN RECEPTOR NEGATIVE STATUS (ER-): ICD-10-CM

## 2022-09-28 PROCEDURE — 77067 SCR MAMMO BI INCL CAD: CPT

## 2022-10-06 ENCOUNTER — APPOINTMENT (OUTPATIENT)
Dept: RADIATION ONCOLOGY | Age: 72
End: 2022-10-06

## 2022-10-27 ENCOUNTER — HOSPITAL ENCOUNTER (OUTPATIENT)
Dept: LAB | Age: 72
Discharge: HOME | End: 2022-10-30
Payer: MEDICARE

## 2022-10-27 ENCOUNTER — OFFICE VISIT (OUTPATIENT)
Dept: ONCOLOGY | Age: 72
End: 2022-10-27
Payer: MEDICARE

## 2022-10-27 VITALS
DIASTOLIC BLOOD PRESSURE: 49 MMHG | RESPIRATION RATE: 19 BRPM | HEIGHT: 65 IN | WEIGHT: 226 LBS | OXYGEN SATURATION: 97 % | TEMPERATURE: 97.6 F | HEART RATE: 90 BPM | SYSTOLIC BLOOD PRESSURE: 127 MMHG | BODY MASS INDEX: 37.65 KG/M2

## 2022-10-27 DIAGNOSIS — C50.211 MALIGNANT NEOPLASM OF UPPER-INNER QUADRANT OF RIGHT BREAST IN FEMALE, ESTROGEN RECEPTOR NEGATIVE (HCC): Primary | ICD-10-CM

## 2022-10-27 DIAGNOSIS — Z17.1 MALIGNANT NEOPLASM OF UPPER-INNER QUADRANT OF RIGHT BREAST IN FEMALE, ESTROGEN RECEPTOR NEGATIVE (HCC): Primary | ICD-10-CM

## 2022-10-27 DIAGNOSIS — R53.83 OTHER FATIGUE: ICD-10-CM

## 2022-10-27 DIAGNOSIS — C50.211 MALIGNANT NEOPLASM OF UPPER-INNER QUADRANT OF RIGHT FEMALE BREAST, UNSPECIFIED ESTROGEN RECEPTOR STATUS (HCC): ICD-10-CM

## 2022-10-27 LAB
ALBUMIN SERPL-MCNC: 3.2 G/DL (ref 3.2–4.6)
ALBUMIN/GLOB SERPL: 0.8 {RATIO} (ref 0.4–1.6)
ALP SERPL-CCNC: 111 U/L (ref 50–136)
ALT SERPL-CCNC: 21 U/L (ref 12–65)
ANION GAP SERPL CALC-SCNC: 5 MMOL/L (ref 2–11)
AST SERPL-CCNC: 25 U/L (ref 15–37)
BASOPHILS # BLD: 0 K/UL (ref 0–0.2)
BASOPHILS NFR BLD: 1 % (ref 0–2)
BILIRUB SERPL-MCNC: 0.6 MG/DL (ref 0.2–1.1)
BUN SERPL-MCNC: 12 MG/DL (ref 8–23)
CALCIUM SERPL-MCNC: 9.7 MG/DL (ref 8.3–10.4)
CANCER AG15-3 SERPL-ACNC: 17.6 U/ML (ref 1–35)
CHLORIDE SERPL-SCNC: 108 MMOL/L (ref 101–110)
CO2 SERPL-SCNC: 27 MMOL/L (ref 21–32)
CREAT SERPL-MCNC: 1.1 MG/DL (ref 0.6–1)
DIFFERENTIAL METHOD BLD: ABNORMAL
EOSINOPHIL # BLD: 0.2 K/UL (ref 0–0.8)
EOSINOPHIL NFR BLD: 4 % (ref 0.5–7.8)
ERYTHROCYTE [DISTWIDTH] IN BLOOD BY AUTOMATED COUNT: 13.7 % (ref 11.9–14.6)
GLOBULIN SER CALC-MCNC: 4 G/DL (ref 2.8–4.5)
GLUCOSE SERPL-MCNC: 116 MG/DL (ref 65–100)
HCT VFR BLD AUTO: 38.5 % (ref 35.8–46.3)
HGB BLD-MCNC: 12.4 G/DL (ref 11.7–15.4)
IMM GRANULOCYTES # BLD AUTO: 0 K/UL (ref 0–0.5)
IMM GRANULOCYTES NFR BLD AUTO: 0 % (ref 0–5)
LYMPHOCYTES # BLD: 1.5 K/UL (ref 0.5–4.6)
LYMPHOCYTES NFR BLD: 26 % (ref 13–44)
MCH RBC QN AUTO: 30.7 PG (ref 26.1–32.9)
MCHC RBC AUTO-ENTMCNC: 32.2 G/DL (ref 31.4–35)
MCV RBC AUTO: 95.3 FL (ref 82–102)
MONOCYTES # BLD: 0.5 K/UL (ref 0.1–1.3)
MONOCYTES NFR BLD: 8 % (ref 4–12)
NEUTS SEG # BLD: 3.5 K/UL (ref 1.7–8.2)
NEUTS SEG NFR BLD: 61 % (ref 43–78)
NRBC # BLD: 0 K/UL (ref 0–0.2)
PLATELET # BLD AUTO: 151 K/UL (ref 150–450)
PMV BLD AUTO: 10.6 FL (ref 9.4–12.3)
POTASSIUM SERPL-SCNC: 4 MMOL/L (ref 3.5–5.1)
PROT SERPL-MCNC: 7.2 G/DL (ref 6.3–8.2)
RBC # BLD AUTO: 4.04 M/UL (ref 4.05–5.2)
SODIUM SERPL-SCNC: 140 MMOL/L (ref 133–143)
WBC # BLD AUTO: 5.7 K/UL (ref 4.3–11.1)

## 2022-10-27 PROCEDURE — 1123F ACP DISCUSS/DSCN MKR DOCD: CPT | Performed by: NURSE PRACTITIONER

## 2022-10-27 PROCEDURE — 80053 COMPREHEN METABOLIC PANEL: CPT

## 2022-10-27 PROCEDURE — 86300 IMMUNOASSAY TUMOR CA 15-3: CPT

## 2022-10-27 PROCEDURE — 99214 OFFICE O/P EST MOD 30 MIN: CPT | Performed by: NURSE PRACTITIONER

## 2022-10-27 PROCEDURE — 85025 COMPLETE CBC W/AUTO DIFF WBC: CPT

## 2022-10-27 PROCEDURE — 36415 COLL VENOUS BLD VENIPUNCTURE: CPT

## 2022-10-27 ASSESSMENT — PATIENT HEALTH QUESTIONNAIRE - PHQ9
1. LITTLE INTEREST OR PLEASURE IN DOING THINGS: 0
2. FEELING DOWN, DEPRESSED OR HOPELESS: 0
SUM OF ALL RESPONSES TO PHQ9 QUESTIONS 1 & 2: 0
SUM OF ALL RESPONSES TO PHQ QUESTIONS 1-9: 0

## 2022-10-27 NOTE — PATIENT INSTRUCTIONS
Hospital Outpatient Visit on 10/27/2022   Component Date Value Ref Range Status    WBC 10/27/2022 5.7  4.3 - 11.1 K/uL Final    RBC 10/27/2022 4.04 (A)  4.05 - 5.2 M/uL Final    Hemoglobin 10/27/2022 12.4  11.7 - 15.4 g/dL Final    Hematocrit 10/27/2022 38.5  35.8 - 46.3 % Final    MCV 10/27/2022 95.3  82.0 - 102.0 FL Final    MCH 10/27/2022 30.7  26.1 - 32.9 PG Final    MCHC 10/27/2022 32.2  31.4 - 35.0 g/dL Final    RDW 10/27/2022 13.7  11.9 - 14.6 % Final    Platelets 36/00/9987 151  150 - 450 K/uL Final    MPV 10/27/2022 10.6  9.4 - 12.3 FL Final    nRBC 10/27/2022 0.00  0.0 - 0.2 K/uL Final    **Note: Absolute NRBC parameter is now reported with Hemogram**    Seg Neutrophils 10/27/2022 61  43 - 78 % Final    Lymphocytes 10/27/2022 26  13 - 44 % Final    Monocytes 10/27/2022 8  4.0 - 12.0 % Final    Eosinophils % 10/27/2022 4  0.5 - 7.8 % Final    Basophils 10/27/2022 1  0.0 - 2.0 % Final    Immature Granulocytes 10/27/2022 0  0.0 - 5.0 % Final    Segs Absolute 10/27/2022 3.5  1.7 - 8.2 K/UL Final    Absolute Lymph # 10/27/2022 1.5  0.5 - 4.6 K/UL Final    Absolute Mono # 10/27/2022 0.5  0.1 - 1.3 K/UL Final    Absolute Eos # 10/27/2022 0.2  0.0 - 0.8 K/UL Final    Basophils Absolute 10/27/2022 0.0  0.0 - 0.2 K/UL Final    Absolute Immature Granulocyte 10/27/2022 0.0  0.0 - 0.5 K/UL Final    Differential Type 10/27/2022 AUTOMATED    Final    Sodium 10/27/2022 140  133 - 143 mmol/L Final    Potassium 10/27/2022 4.0  3.5 - 5.1 mmol/L Final    Chloride 10/27/2022 108  101 - 110 mmol/L Final    CO2 10/27/2022 27  21 - 32 mmol/L Final    Anion Gap 10/27/2022 5  2 - 11 mmol/L Final    Glucose 10/27/2022 116 (A)  65 - 100 mg/dL Final    BUN 10/27/2022 12  8 - 23 MG/DL Final    Creatinine 10/27/2022 1.10 (A)  0.6 - 1.0 MG/DL Final    Est, Glom Filt Rate 10/27/2022 53 (A)  >60 ml/min/1.73m2 Final    Comment:      Pediatric calculator link: Sita.at. org/professionals/kdoqi/gfr_calculatorped       Effective Oct 3, 2022       These results are not intended for use in patients <25years of age. eGFR results are calculated without a race factor using  the 2021 CKD-EPI equation. Careful clinical correlation is recommended, particularly when comparing to results calculated using previous equations. The CKD-EPI equation is less accurate in patients with extremes of muscle mass, extra-renal metabolism of creatinine, excessive creatine ingestion, or following therapy that affects renal tubular secretion.       Calcium 10/27/2022 9.7  8.3 - 10.4 MG/DL Final    Total Bilirubin 10/27/2022 0.6  0.2 - 1.1 MG/DL Final    ALT 10/27/2022 21  12 - 65 U/L Final    AST 10/27/2022 25  15 - 37 U/L Final    Alk Phosphatase 10/27/2022 111  50 - 136 U/L Final    Total Protein 10/27/2022 7.2  6.3 - 8.2 g/dL Final    Albumin 10/27/2022 3.2  3.2 - 4.6 g/dL Final    Globulin 10/27/2022 4.0  2.8 - 4.5 g/dL Final    Albumin/Globulin Ratio 10/27/2022 0.8  0.4 - 1.6   Final    CA 15-3 10/27/2022 17.60  1.0 - 35.0 U/mL Final

## 2022-10-27 NOTE — PROGRESS NOTES
[facility] Hematology and Oncology: Office Visit Established Patient    Chief Complaint:    Chief Complaint   Patient presents with    Follow-up         History of Present Illness:  Ms. Parvez Serna is a 67 y.o. female who returns today for management of breast cancer. She has a history of left breast cancer treated in 2001, she underwent lumpectomy then adjuvant chemotherapy and radiation with [facility] (Dr. Pipo Mariscal). She apparently was not followed by them long term and was  not on endocrine therapy at that time. She believes that the chemotherapy regimen including Taxotere but she cannot remember if she had Adriamycin. She believes she had 4 cycles and she remembers she was very sick on therapy. On 8/19/2020, she had  a routine diagnostic bilateral mammogram which reported a new right breast mass suspicious for malignancy. She underwent core needle biopsy of the mass with pathology showing infiltrating duct carcinoma, high-grade, ER/MS/HER-2 negative but additional  IHC confirmed mammary origin. On 10/5/2020, she underwent right partial mastectomy and right sentinel node biopsy by Dr. Malorie Ash. The surgical pathology confirmed invasive ductal carcinoma, high grade, 1.4 cm in greatest dimension. 2 sentinel nodes  were found to be negative for tumor. We recommended that she undergo adjuvant chemotherapy for risk reduction, but because of the possibility that she received anthracycline therapy in the past, we recommended assessment with TTE and an attempt to obtain  old records before deciding on specific regimens. We have been unable to locate records from [facility] which detail her previous regimen from 2001, but she has an old insurance approval letter that suggests that she did receive Adriamycin as part of her adjuvant  therapy.   Because we cannot be sure about her previous anthracycline exposure, and because the ABC meta-analysis only shows about 3% improvement in patients with anthracycline-containing regimens (likely an overestimate since this only included patients  T2 or higher), I recommend the use of Taxotere plus Cytoxan administered every three weeks for four cycles, with growth factor support. She completed 4 cycles of TC with her final dose in January 2021, she tolerated this reasonably well. She then went  on to complete adjuvant radiation in March 2021. She is here for routine 4 month follow-up. Since last seen she has been well overall. She has intentionally lost another 6# and BP is improved. Fatigue is ongoing in the late afternoon. There are no respiratory changes or edema. She continues to report BLE numbness in the evenings or when legs are elevated and has noticed a mild darkening of her skin from her shins down. There is no bone pain and she denies any breast changes or concerns. Review of Systems:  Constitutional: Negative. HENT: Negative. Eyes: Negative. Respiratory: Negative. Cardiovascular: Negative. Gastrointestinal: Negative. Genitourinary: Negative. Musculoskeletal: Positive for leg weakness/numbness. Skin: Negative. Neurological: Negative. Endo/Heme/Allergies: Negative. Psychiatric/Behavioral: Negative. All other systems reviewed and are negative.        No Known Allergies  Past Medical History:   Diagnosis Date    Arthritis     osteo    Breast cancer ([facility] [address] 2001    had chemo and radiation for breast cancer in left breast    Breast cancer ([facility] [address] dx 9/17/20    right breast-- lumpectomy/ plans for chemo and radiation--- followed by dr Kaz Ny    Chronic kidney disease     stage 3 kidney disease--- followed by dr. Artur Vidal    GERD (gastroesophageal reflux disease)      prn meds    Hypercholesterolemia     Hypertension     controlled with med    Nausea & vomiting     post op n/v     Thyroid disease     hypo     Past Surgical History:   Procedure Laterality Date    BREAST BIOPSY Right 09/17/2020    U/S    BREAST LUMPECTOMY Left 2001    BREAST LUMPECTOMY Right 2020 CATARACT REMOVAL Bilateral 2018     SECTION      1990    COLONOSCOPY      IR REMOVE TUNNELED VAD W PORT  10/27/2021    MASTECTOMY Right 10/5/2020    RIGHT WIRE LOC PARTIAL MASTECTOMY performed by Adrien Bishop MD at [facility] RIGHT Right 2020    US BREAST NEEDLE BIOPSY RIGHT 2020 [facility]    US GUIDED NEEDLE LOC OF RIGHT BREAST Right 10/5/2020    US GUIDED NEEDLE LOC OF RIGHT BREAST 10/5/2020 [facility]     Family History   Problem Relation Age of Onset    Cancer Cousin         lung    Breast Cancer Neg Hx     Heart Disease Mother     Cancer Father         prostate     Cancer Cousin         ovarian    Cancer Paternal Aunt         leukemia     Cancer Paternal Uncle         leukemia     Cancer Paternal Uncle         prostate    Cancer Paternal Uncle         pancreatic    Cancer Brother         prostate     Social History     Socioeconomic History    Marital status:       Spouse name: Not on file    Number of children: Not on file    Years of education: Not on file    Highest education level: Not on file   Occupational History    Not on file   Tobacco Use    Smoking status: Never    Smokeless tobacco: Never   Substance and Sexual Activity    Alcohol use: Never    Drug use: Never    Sexual activity: Not on file   Other Topics Concern    Not on file   Social History Narrative    Not on file     Social Determinants of Health     Financial Resource Strain: Not on file   Food Insecurity: Not on file   Transportation Needs: Not on file   Physical Activity: Not on file   Stress: Not on file   Social Connections: Not on file   Intimate Partner Violence: Not on file   Housing Stability: Not on file     Current Outpatient Medications   Medication Sig Dispense Refill    DULoxetine (CYMBALTA) 30 MG extended release capsule       traMADol (ULTRAM) 50 MG tablet TAKE 1 TABLET BY MOUTH THREE TIMES DAILY AS NEEDED FOR PAIN      Calcium Carbonate-Vitamin D (CALCIUM-VITAMIN D) 600-125 MG-UNIT TABS Take 1 tablet by mouth      ergocalciferol (ERGOCALCIFEROL) 1.25 MG (53329 UT) capsule TK 1 C PO 1 TIME WEEKLY      famotidine (PEPCID) 20 MG tablet Take 20 mg by mouth daily as needed      levothyroxine (SYNTHROID) 100 MCG tablet Take by mouth every morning (before breakfast)      ondansetron (ZOFRAN) 8 MG tablet Take 8 mg by mouth every 8 hours as needed      quinapril (ACCUPRIL) 20 MG tablet Take 20 mg by mouth daily      rosuvastatin (CRESTOR) 10 MG tablet Take 10 mg by mouth three times a week      silver sulfADIAZINE (SILVADENE) 1 % cream Apply topically daily      triamcinolone (KENALOG) 0.025 % cream Apply topically 2 times daily       No current facility-administered medications for this visit. OBJECTIVE:  BP (!) 127/49 (Site: Left Upper Arm, Position: Sitting, Cuff Size: Large Adult)   Pulse 90   Temp 97.6 °F (36.4 °C) (Oral)   Resp 19   Ht 5' 5\" (1.651 m)   Wt 226 lb (102.5 kg)   SpO2 97%   BMI 37.61 kg/m²     Physical Exam:  Constitutional: Well developed, well nourished female in no acute distress, sitting comfortably in the exam room chair. HEENT: Normocephalic and atraumatic. Sclerae anicteric. Neck supple without JVD. No thyromegaly present. Lymph node   No palpable submandibular, cervical, supraclavicular, or axillary lymph nodes. Skin Warm and dry. No bruising and no rash noted. No erythema. No pallor. +minimal vascular changes of BLE   Respiratory Lungs are clear to auscultation bilaterally without wheezes, rales or rhonchi, normal air exchange without accessory muscle use. CVS Normal rate, regular rhythm and normal S1 and S2. No murmurs, gallops, or rubs. Abdomen Soft, nontender and nondistended, normoactive bowel sounds. Neuro Grossly nonfocal with no obvious sensory or motor deficits. MSK Normal range of motion in general.  No edema and no tenderness. Psych Appropriate mood and affect.       Labs:  Recent Results personally reviewed. Breast cancer: IDC, high grade, right breast, triple negative, s/p lumpectomy and sentinel node, tumor 1.4 cm with 2 nodes negative. [id number] (stage 1B)   Although her tumor was small and node negative, the triple negative histology greatly increases her risk of recurrence, as she will not have any benefit from adjuvant endocrine therapy. The only systemic  therapy that will lower her recurrence risk is cytotoxic therapy. Therefore, I recommend that chemotherapy be administered in the adjuvant setting for risk reduction. Unfortunately, she has already had chemotherapy in 2001 for a contralateral breast  cancer, which was presumably triple negative as well based on her adjuvant therapy. If she already had Adriamycin, this limits our ability to use it in the current setting. We have been unable to locate records from [facility] which detail her previous regimen  from 2001, but she has an old insurance approval letter that suggests that she did receive Adriamycin as part of her adjuvant therapy. Because we cannot be sure about her previous anthracycline exposure, and because the ABC meta-analysis only shows about  3% improvement in patients with anthracycline-containing regimens (likely an overestimate since this only included patients T2 or higher), I recommend the use of Taxotere plus Cytoxan administered every three weeks for four cycles, with growth factor  support. She completed 4 cycles of TC with her final dose in January 2021, she tolerated this reasonably well. She then went on to complete adjuvant radiation in March 2021. She is here for routine 4 month follow-up. Since last seen she has been well overall. She has intentionally lost another 6# and BP is improved. Fatigue is ongoing in the late afternoon. There are no respiratory changes or edema.   She continues to report BLE numbness in the evenings or when legs are elevated and has noticed a mild darkening of her skin from her shins down. There is no bone pain and she denies any breast changes or concerns. Labs reviewed, Ca 15-3 remains normal.  I explained that her leg complaint sound to be of a vascular nature and she will follow up with her PCP. Mammogram in September was benign, repeat in September 2023. All questions were asked and answered to the best of my ability. F/u 4 months for observation,  repeat labs including Ca 15-3.            ROBIN Call - [facility] Hematology and Oncology  [address] [place] [address]  Office : [phone]  Fax : [phone]

## 2022-11-10 ENCOUNTER — OFFICE VISIT (OUTPATIENT)
Dept: SURGERY | Age: 72
End: 2022-11-10
Payer: MEDICARE

## 2022-11-10 VITALS
WEIGHT: 230.3 LBS | DIASTOLIC BLOOD PRESSURE: 56 MMHG | BODY MASS INDEX: 38.32 KG/M2 | HEART RATE: 96 BPM | SYSTOLIC BLOOD PRESSURE: 105 MMHG

## 2022-11-10 DIAGNOSIS — E66.9 OBESITY WITH BODY MASS INDEX GREATER THAN 30: ICD-10-CM

## 2022-11-10 DIAGNOSIS — L02.31 ABSCESS OF RIGHT BUTTOCK: Primary | ICD-10-CM

## 2022-11-10 PROCEDURE — 99213 OFFICE O/P EST LOW 20 MIN: CPT | Performed by: SURGERY

## 2022-11-10 PROCEDURE — 1123F ACP DISCUSS/DSCN MKR DOCD: CPT | Performed by: SURGERY

## 2022-11-10 NOTE — PROGRESS NOTES
Medical Student Office Note   Name:  Dl Mccarthy   Age:  67 y.o. Sex:  female   :  1950   MRN:  [id number]     Presenting Complaint: Left buttock pain/drainage    Subjective   Ms. Maude Eng is a 66 y/o female who is presenting to the office with complaints of pain, swelling, and drainage from an area on her left buttock for the past 2 weeks. Pt states she has had a small bump there for quite some time. She reports the symptoms began after an accidental trip and fall 2 weeks ago. She denies any other injuries. She denies any similar symptoms previously. Her daughter has been taking care of this area and changing her dressings daily. She denies any fevers, chills, NV. She was given a course of Keflex, which she finished this morning. Pt states that the area has become minimally less tender and the drainage has decreased since starting the antibiotic. Objective:   Physical Exam:   Blood pressure (!) 105/56, pulse 96, weight 230 lb 4.8 oz (104.5 kg), not currently breastfeeding. General:    Well nourished. No overt distress. NAD. Head:  Normocephalic, atraumatic  CV:   RRR. Lungs:   Respirations even, unlabored  Abdomen:   Soft, no distention  Extremities: No cyanosis or clubbing. Skin:     Warm and dry. Area of erythema to the left buttock that has a small centralized opening. Scant creamy drainage noted. No fluctuance. Neuro:  A&O x3  Psych:  Normal mood and affect. Assessment & Plan:   Inflamed sebaceous cyst   -The area was thoroughly examined and probed. There is a small pocket likely from cyst rupture. The pocket was packed with sterile gauze. Instructed patient on dressing changes. She is to gradually remove the packing for the next few days while the area heals. Her daughter will do this for her.    -All questions answered, the patient understands and agrees to the treatment plan.        Signed:  Maksim Smith OMS-III

## 2022-11-10 NOTE — PROGRESS NOTES
Danilo Ga MD, [facility], [address] [place] [address]  S[address]  Phone [phone]   Fax [phone]      Date of visit: 11/10/2022     Primary/Requesting provider: Saad Connell MD    Chief Complaint   Patient presents with    Follow-up     FU - left buttock abscess       Refer to attested MSIII note.

## 2023-02-21 ENCOUNTER — PREP FOR PROCEDURE (OUTPATIENT)
Dept: SURGERY | Age: 73
End: 2023-02-21

## 2023-02-21 ENCOUNTER — OFFICE VISIT (OUTPATIENT)
Dept: SURGERY | Age: 73
End: 2023-02-21
Payer: MEDICARE

## 2023-02-21 VITALS
HEART RATE: 69 BPM | BODY MASS INDEX: 36.65 KG/M2 | HEIGHT: 65 IN | SYSTOLIC BLOOD PRESSURE: 130 MMHG | WEIGHT: 220 LBS | DIASTOLIC BLOOD PRESSURE: 75 MMHG

## 2023-02-21 DIAGNOSIS — K60.3 PERIANAL FISTULA: Primary | ICD-10-CM

## 2023-02-21 DIAGNOSIS — E66.9 OBESITY WITH BODY MASS INDEX GREATER THAN 30: ICD-10-CM

## 2023-02-21 DIAGNOSIS — Z85.3 HISTORY OF INVASIVE BREAST CANCER: ICD-10-CM

## 2023-02-21 PROBLEM — K60.30 PERIANAL FISTULA: Status: ACTIVE | Noted: 2023-02-21

## 2023-02-21 PROCEDURE — 1123F ACP DISCUSS/DSCN MKR DOCD: CPT | Performed by: SURGERY

## 2023-02-21 PROCEDURE — 99214 OFFICE O/P EST MOD 30 MIN: CPT | Performed by: SURGERY

## 2023-02-21 ASSESSMENT — ENCOUNTER SYMPTOMS
GASTROINTESTINAL NEGATIVE: 1
ALLERGIC/IMMUNOLOGIC NEGATIVE: 1
EYES NEGATIVE: 1
RESPIRATORY NEGATIVE: 1

## 2023-02-21 NOTE — H&P (VIEW-ONLY)
alert and oriented to person, place, and time. Cranial Nerves: No cranial nerve deficit. Psychiatric:         Mood and Affect: Mood normal.         Behavior: Behavior normal.         Thought Content: Thought content normal.         Judgment: Judgment normal.           ASSESSMENT and PLAN:    1. Perianal fistula    2. Obesity with body mass index greater than 30    3. History of invasive breast cancer         History of persistent, intermittent drainage is suggestive of a fistula; her subjective report of passage of air is also consistent with fistula. Exam in office is inconclusive. Discussed options including observation, I&D, or definitive management. Curative procedures include fistulotomy, Seton, or plug. Discussed fistulotomy for superficial fistula, if identified, vs plug for trans- or supra-sphincteric fistula, if identified. Plugs are associated with 60-80% success, with negligible incontinence risk, and can be repeated if unsuccessful. Will arrange exam under anesthesia, possible fistulotomy or fistula plug placement. Procedure reviewed. Risks reviewed to include bleeding, infection, scarring, recurrence.

## 2023-02-21 NOTE — PROGRESS NOTES
Makayla Nixon MD, [facility], [address] [place] [address]  Sravan Watson  Phone [phone]   Fax [phone]      Date of visit: 2/23/2023     Primary/Requesting provider: Mckayla Ashraf MD    Chief Complaint   Patient presents with    Follow-up     Perirectal fistula          Patient is a 68 y.o. female who presents for additional evaluation of a perianal draining site, for which she was seen 11/10/22. At that time, she reported an acute worsening and possible infection of a pre-existing nodule which flared up after she fell on it. The site was drained. She reports that the area never completely healed and continues to drain. Her PCP has reportedly packed it a few times, but it continues to persist.  She notes the drainage is intermittent, with pain/discomfort increasing until it drains, then it will drain for a few days before repeating the cycle. She also believes she has experienced gas passing through the site. She denies fever, chills, or alteration of baseline bowel/bladder function currently, although she did experience some urge incontinence during the initial episode.     Medications:   Current Outpatient Medications on File Prior to Visit   Medication Sig Dispense Refill    DULoxetine (CYMBALTA) 30 MG extended release capsule       traMADol (ULTRAM) 50 MG tablet TAKE 1 TABLET BY MOUTH THREE TIMES DAILY AS NEEDED FOR PAIN      Calcium Carbonate-Vitamin D (CALCIUM-VITAMIN D) 600-125 MG-UNIT TABS Take 1 tablet by mouth      ergocalciferol (ERGOCALCIFEROL) 1.25 MG (08752 UT) capsule TK 1 C PO 1 TIME WEEKLY      famotidine (PEPCID) 20 MG tablet Take 20 mg by mouth daily as needed      levothyroxine (SYNTHROID) 100 MCG tablet Take by mouth every morning (before breakfast)      ondansetron (ZOFRAN) 8 MG tablet Take 8 mg by mouth every 8 hours as needed      quinapril (ACCUPRIL) 20 MG tablet Take 20 mg by mouth daily      rosuvastatin (CRESTOR) 10 MG tablet Take 10 mg by mouth three times a week      silver sulfADIAZINE (SILVADENE) 1 % cream Apply topically daily      triamcinolone (KENALOG) 0.025 % cream Apply topically 2 times daily       No current facility-administered medications on file prior to visit.        Allergies:   Allergies   Allergen Reactions    Adhesive Tape      Can use paper tape per patient        Past History:  Past Medical History:   Diagnosis Date    Arthritis     osteo    Breast cancer (HCC)     had chemo and radiation for breast cancer in left breast    Breast cancer (HCC) dx 20    right breast-- lumpectomy/ plans for chemo and radiation--- followed by dr velazquez    Chronic kidney disease     stage 3 kidney disease--- followed by dr. mary BUNDY (gastroesophageal reflux disease)      prn meds    Hypercholesterolemia     Hypertension     controlled with med    Nausea & vomiting     post op n/v     Thyroid disease     hypo      Past Surgical History:   Procedure Laterality Date    BREAST BIOPSY Right 2020    U/S    BREAST LUMPECTOMY Left     BREAST LUMPECTOMY Right 2020    CATARACT REMOVAL Bilateral 2018     SECTION      ,     COLONOSCOPY      IR REMOVE TUNNELED VAD W PORT  10/27/2021    MASTECTOMY Right 10/5/2020    RIGHT WIRE LOC PARTIAL MASTECTOMY performed by Eugene Newsome MD at [facility]    US BREAST BIOPSY W LOC DEVICE 1ST LESION RIGHT Right 2020    US BREAST NEEDLE BIOPSY RIGHT 2020 [facility]    US GUIDED NEEDLE LOC OF RIGHT BREAST Right 10/5/2020    US GUIDED NEEDLE LOC OF RIGHT BREAST 10/5/2020 [facility]        Family and Social History:  Family History   Problem Relation Age of Onset    Cancer Cousin         lung    Breast Cancer Neg Hx     Heart Disease Mother     Cancer Father         prostate     Cancer Cousin         ovarian    Cancer Paternal Aunt         leukemia     Cancer Paternal Uncle         leukemia     Cancer Paternal Uncle         prostate    Cancer Paternal Uncle          pancreatic    Cancer Brother         prostate     Social History     Socioeconomic History    Marital status:      Spouse name: Not on file    Number of children: Not on file    Years of education: Not on file    Highest education level: Not on file   Occupational History    Not on file   Tobacco Use    Smoking status: Never    Smokeless tobacco: Never   Substance and Sexual Activity    Alcohol use: Never    Drug use: Never    Sexual activity: Not on file   Other Topics Concern    Not on file   Social History Narrative    Not on file     Social Determinants of Health     Financial Resource Strain: Not on file   Food Insecurity: Not on file   Transportation Needs: Not on file   Physical Activity: Not on file   Stress: Not on file   Social Connections: Not on file   Intimate Partner Violence: Not on file   Housing Stability: Not on file           Review of Systems   Constitutional: Negative. HENT: Negative. Eyes: Negative. Respiratory: Negative. Cardiovascular: Negative. Gastrointestinal: Negative. Endocrine: Negative. Genitourinary: Negative. Musculoskeletal: Negative. Skin: Negative. Allergic/Immunologic: Negative. Neurological: Negative. Hematological: Negative. Psychiatric/Behavioral: Negative. Physical Exam  Vitals and nursing note reviewed. Constitutional:       Appearance: Normal appearance. She is obese. HENT:      Head: Normocephalic and atraumatic. Eyes:      General: No scleral icterus. Pupils: Pupils are equal, round, and reactive to light. Cardiovascular:      Rate and Rhythm: Normal rate. Pulmonary:      Effort: Pulmonary effort is normal. No respiratory distress. Breath sounds: No stridor. Abdominal:      Palpations: Abdomen is soft. Genitourinary:      Musculoskeletal:         General: No deformity. Skin:     General: Skin is warm and dry. Neurological:      General: No focal deficit present.       Mental Status: She is alert and oriented to person, place, and time. Cranial Nerves: No cranial nerve deficit. Psychiatric:         Mood and Affect: Mood normal.         Behavior: Behavior normal.         Thought Content: Thought content normal.         Judgment: Judgment normal.           ASSESSMENT and PLAN:    1. Perianal fistula    2. Obesity with body mass index greater than 30    3. History of invasive breast cancer         History of persistent, intermittent drainage is suggestive of a fistula; her subjective report of passage of air is also consistent with fistula. Exam in office is inconclusive. Discussed options including observation, I&D, or definitive management. Curative procedures include fistulotomy, Seton, or plug. Discussed fistulotomy for superficial fistula, if identified, vs plug for trans- or supra-sphincteric fistula, if identified. Plugs are associated with 60-80% success, with negligible incontinence risk, and can be repeated if unsuccessful. Will arrange exam under anesthesia, possible fistulotomy or fistula plug placement. Procedure reviewed. Risks reviewed to include bleeding, infection, scarring, recurrence.

## 2023-03-08 NOTE — PERIOP NOTE
The following records have been requested from [id number] (Dr. María Elena Gaona):       Please send most recent EKG tracing, echocardiogram and  last office visit via fax to [phone] for anesthesia review prior to upcoming surgery. Thank you!

## 2023-03-08 NOTE — PERIOP NOTE
Patient verified name and . Order for consent is found in EHR and matches case posting; patient verifies procedure. Type 1b surgery, Phone assessment complete. Orders were received. Labs per surgeon: none  Labs per anesthesia protocol: none    Pt states her PCP - Dr. Maggy Aguirre @ [facility] Phone: [phone]  Fax: [phone] - told her she has \"a dark spot on her heart\". States she was not referred to a cardiologist and her PCP does echo's every 6 months (most recent 2022 per pt). Most recent office note and echo requested via faxed request for review prior to surgery. Patient answered medical/surgical history questions at their best of ability. All prior to admission medications documented in EPIC. Patient instructed to take the following medications the day of surgery according to anesthesia guidelines with a small sip of water: Cymbalta, Synthroid. Hold all vitamins 7 days prior to surgery and NSAIDS 5 days prior to surgery. Prescription meds to hold:none  Patient instructed on the following:    > Arrive at [address], time of arrival to be called the day before by 1700  > NPO after midnight, unless otherwise indicated, including gum, mints, and ice chips  > Responsible adult must drive patient to the hospital, stay during surgery, and patient will need supervision 24 hours after anesthesia  > Use antibacterial soap in shower the night before surgery and on the morning of surgery  > All piercings must be removed prior to arrival.    > Leave all valuables (money and jewelry) at home but bring insurance card and ID on DOS.   > Do not wear make-up, nail polish, lotions, cologne, perfumes, powders, or oil on skin. Artificial nails are not permitted.

## 2023-03-20 ENCOUNTER — ANESTHESIA EVENT (OUTPATIENT)
Dept: SURGERY | Age: 73
End: 2023-03-20
Payer: MEDICARE

## 2023-03-21 ENCOUNTER — ANESTHESIA (OUTPATIENT)
Dept: SURGERY | Age: 73
End: 2023-03-21
Payer: MEDICARE

## 2023-03-21 ENCOUNTER — HOSPITAL ENCOUNTER (OUTPATIENT)
Age: 73
Setting detail: OUTPATIENT SURGERY
Discharge: HOME | End: 2023-03-21
Attending: SURGERY | Admitting: SURGERY
Payer: MEDICARE

## 2023-03-21 VITALS
OXYGEN SATURATION: 91 % | SYSTOLIC BLOOD PRESSURE: 126 MMHG | DIASTOLIC BLOOD PRESSURE: 46 MMHG | RESPIRATION RATE: 16 BRPM | BODY MASS INDEX: 36 KG/M2 | HEART RATE: 68 BPM | HEIGHT: 65 IN | TEMPERATURE: 98.7 F | WEIGHT: 216.1 LBS

## 2023-03-21 DIAGNOSIS — K60.3 PERIANAL FISTULA: ICD-10-CM

## 2023-03-21 PROCEDURE — 6370000000 HC RX 637 (ALT 250 FOR IP): Performed by: SURGERY

## 2023-03-21 PROCEDURE — 3700000000 HC ANESTHESIA ATTENDED CARE: Performed by: SURGERY

## 2023-03-21 PROCEDURE — 6360000002 HC RX W HCPCS: Performed by: SURGERY

## 2023-03-21 PROCEDURE — 7100000011 HC PHASE II RECOVERY - ADDTL 15 MIN: Performed by: SURGERY

## 2023-03-21 PROCEDURE — 3600000002 HC SURGERY LEVEL 2 BASE: Performed by: SURGERY

## 2023-03-21 PROCEDURE — 7100000010 HC PHASE II RECOVERY - FIRST 15 MIN: Performed by: SURGERY

## 2023-03-21 PROCEDURE — 46050 I&D PERIANAL ABSCESS SUPFC: CPT | Performed by: SURGERY

## 2023-03-21 PROCEDURE — 3700000001 HC ADD 15 MINUTES (ANESTHESIA): Performed by: SURGERY

## 2023-03-21 PROCEDURE — 7100000000 HC PACU RECOVERY - FIRST 15 MIN: Performed by: SURGERY

## 2023-03-21 PROCEDURE — 6360000002 HC RX W HCPCS: Performed by: NURSE ANESTHETIST, CERTIFIED REGISTERED

## 2023-03-21 PROCEDURE — 7100000001 HC PACU RECOVERY - ADDTL 15 MIN: Performed by: SURGERY

## 2023-03-21 PROCEDURE — 3600000012 HC SURGERY LEVEL 2 ADDTL 15MIN: Performed by: SURGERY

## 2023-03-21 PROCEDURE — 6370000000 HC RX 637 (ALT 250 FOR IP): Performed by: ANESTHESIOLOGY

## 2023-03-21 PROCEDURE — C1763 CONN TISS, NON-HUMAN: HCPCS | Performed by: SURGERY

## 2023-03-21 PROCEDURE — 46280 REMOVE ANAL FIST COMPLEX: CPT | Performed by: SURGERY

## 2023-03-21 PROCEDURE — 2580000003 HC RX 258: Performed by: ANESTHESIOLOGY

## 2023-03-21 PROCEDURE — 2500000003 HC RX 250 WO HCPCS: Performed by: NURSE ANESTHETIST, CERTIFIED REGISTERED

## 2023-03-21 PROCEDURE — 2500000003 HC RX 250 WO HCPCS: Performed by: SURGERY

## 2023-03-21 PROCEDURE — 2709999900 HC NON-CHARGEABLE SUPPLY: Performed by: SURGERY

## 2023-03-21 DEVICE — BIODESIGN ANAL FISTULA PLUG
Type: IMPLANTABLE DEVICE | Site: RECTUM | Status: FUNCTIONAL
Brand: BIODESIGN

## 2023-03-21 RX ORDER — SODIUM CHLORIDE, SODIUM LACTATE, POTASSIUM CHLORIDE, CALCIUM CHLORIDE 600; 310; 30; 20 MG/100ML; MG/100ML; MG/100ML; MG/100ML
INJECTION, SOLUTION INTRAVENOUS CONTINUOUS
Status: DISCONTINUED | OUTPATIENT
Start: 2023-03-21 | End: 2023-03-21 | Stop reason: HOSPADM

## 2023-03-21 RX ORDER — PROPOFOL 10 MG/ML
INJECTION, EMULSION INTRAVENOUS PRN
Status: DISCONTINUED | OUTPATIENT
Start: 2023-03-21 | End: 2023-03-21 | Stop reason: SDUPTHER

## 2023-03-21 RX ORDER — DIPHENHYDRAMINE HYDROCHLORIDE 50 MG/ML
12.5 INJECTION INTRAMUSCULAR; INTRAVENOUS
Status: DISCONTINUED | OUTPATIENT
Start: 2023-03-21 | End: 2023-03-21 | Stop reason: HOSPADM

## 2023-03-21 RX ORDER — SODIUM CHLORIDE 0.9 % (FLUSH) 0.9 %
5-40 SYRINGE (ML) INJECTION EVERY 12 HOURS SCHEDULED
Status: DISCONTINUED | OUTPATIENT
Start: 2023-03-21 | End: 2023-03-21 | Stop reason: HOSPADM

## 2023-03-21 RX ORDER — FENTANYL CITRATE 50 UG/ML
25 INJECTION, SOLUTION INTRAMUSCULAR; INTRAVENOUS EVERY 5 MIN PRN
Status: DISCONTINUED | OUTPATIENT
Start: 2023-03-21 | End: 2023-03-21 | Stop reason: HOSPADM

## 2023-03-21 RX ORDER — ONDANSETRON 2 MG/ML
INJECTION INTRAMUSCULAR; INTRAVENOUS PRN
Status: DISCONTINUED | OUTPATIENT
Start: 2023-03-21 | End: 2023-03-21 | Stop reason: SDUPTHER

## 2023-03-21 RX ORDER — DEXAMETHASONE SODIUM PHOSPHATE 10 MG/ML
INJECTION INTRAMUSCULAR; INTRAVENOUS PRN
Status: DISCONTINUED | OUTPATIENT
Start: 2023-03-21 | End: 2023-03-21 | Stop reason: SDUPTHER

## 2023-03-21 RX ORDER — ONDANSETRON 2 MG/ML
4 INJECTION INTRAMUSCULAR; INTRAVENOUS
Status: DISCONTINUED | OUTPATIENT
Start: 2023-03-21 | End: 2023-03-21 | Stop reason: HOSPADM

## 2023-03-21 RX ORDER — MIDAZOLAM HYDROCHLORIDE 1 MG/ML
2 INJECTION INTRAMUSCULAR; INTRAVENOUS
Status: DISCONTINUED | OUTPATIENT
Start: 2023-03-21 | End: 2023-03-21 | Stop reason: HOSPADM

## 2023-03-21 RX ORDER — FENTANYL CITRATE 50 UG/ML
INJECTION, SOLUTION INTRAMUSCULAR; INTRAVENOUS PRN
Status: DISCONTINUED | OUTPATIENT
Start: 2023-03-21 | End: 2023-03-21 | Stop reason: SDUPTHER

## 2023-03-21 RX ORDER — METOCLOPRAMIDE HYDROCHLORIDE 5 MG/ML
10 INJECTION INTRAMUSCULAR; INTRAVENOUS
Status: DISCONTINUED | OUTPATIENT
Start: 2023-03-21 | End: 2023-03-21 | Stop reason: HOSPADM

## 2023-03-21 RX ORDER — APREPITANT 40 MG/1
40 CAPSULE ORAL ONCE
Status: COMPLETED | OUTPATIENT
Start: 2023-03-21 | End: 2023-03-21

## 2023-03-21 RX ORDER — HYDROCODONE BITARTRATE AND ACETAMINOPHEN 5; 325 MG/1; MG/1
1 TABLET ORAL EVERY 4 HOURS PRN
Qty: 20 TABLET | Refills: 0 | Status: SHIPPED | OUTPATIENT
Start: 2023-03-21 | End: 2023-03-26

## 2023-03-21 RX ORDER — LIDOCAINE 50 MG/G
OINTMENT TOPICAL PRN
Status: DISCONTINUED | OUTPATIENT
Start: 2023-03-21 | End: 2023-03-21 | Stop reason: HOSPADM

## 2023-03-21 RX ORDER — FENTANYL CITRATE 50 UG/ML
100 INJECTION, SOLUTION INTRAMUSCULAR; INTRAVENOUS
Status: DISCONTINUED | OUTPATIENT
Start: 2023-03-21 | End: 2023-03-21 | Stop reason: HOSPADM

## 2023-03-21 RX ORDER — LIDOCAINE HYDROCHLORIDE 10 MG/ML
INJECTION, SOLUTION INFILTRATION; PERINEURAL PRN
Status: DISCONTINUED | OUTPATIENT
Start: 2023-03-21 | End: 2023-03-21 | Stop reason: HOSPADM

## 2023-03-21 RX ORDER — OXYCODONE HYDROCHLORIDE 5 MG/1
5 TABLET ORAL
Status: DISCONTINUED | OUTPATIENT
Start: 2023-03-21 | End: 2023-03-21 | Stop reason: HOSPADM

## 2023-03-21 RX ORDER — SCOLOPAMINE TRANSDERMAL SYSTEM 1 MG/1
1 PATCH, EXTENDED RELEASE TRANSDERMAL
Status: DISCONTINUED | OUTPATIENT
Start: 2023-03-21 | End: 2023-03-21 | Stop reason: HOSPADM

## 2023-03-21 RX ORDER — LIDOCAINE HYDROCHLORIDE 20 MG/ML
INJECTION, SOLUTION EPIDURAL; INFILTRATION; INTRACAUDAL; PERINEURAL PRN
Status: DISCONTINUED | OUTPATIENT
Start: 2023-03-21 | End: 2023-03-21 | Stop reason: SDUPTHER

## 2023-03-21 RX ADMIN — FENTANYL CITRATE 50 MCG: 50 INJECTION, SOLUTION INTRAMUSCULAR; INTRAVENOUS at 14:38

## 2023-03-21 RX ADMIN — PROPOFOL 180 MG: 10 INJECTION, EMULSION INTRAVENOUS at 14:38

## 2023-03-21 RX ADMIN — APREPITANT 40 MG: 40 CAPSULE ORAL at 13:19

## 2023-03-21 RX ADMIN — LIDOCAINE HYDROCHLORIDE 60 MG: 20 INJECTION, SOLUTION EPIDURAL; INFILTRATION; INTRACAUDAL; PERINEURAL at 14:38

## 2023-03-21 RX ADMIN — SODIUM CHLORIDE, POTASSIUM CHLORIDE, SODIUM LACTATE AND CALCIUM CHLORIDE: 600; 310; 30; 20 INJECTION, SOLUTION INTRAVENOUS at 13:03

## 2023-03-21 RX ADMIN — FENTANYL CITRATE 50 MCG: 50 INJECTION, SOLUTION INTRAMUSCULAR; INTRAVENOUS at 15:00

## 2023-03-21 RX ADMIN — DEXAMETHASONE SODIUM PHOSPHATE 5 MG: 10 INJECTION INTRAMUSCULAR; INTRAVENOUS at 14:48

## 2023-03-21 RX ADMIN — Medication 2000 MG: at 14:42

## 2023-03-21 RX ADMIN — ONDANSETRON 4 MG: 2 INJECTION INTRAMUSCULAR; INTRAVENOUS at 14:48

## 2023-03-21 ASSESSMENT — PAIN - FUNCTIONAL ASSESSMENT: PAIN_FUNCTIONAL_ASSESSMENT: 0-10

## 2023-03-21 NOTE — ANESTHESIA POSTPROCEDURE EVALUATION
Department of Anesthesiology  Postprocedure Note    Patient: Ander Palmer  MRN: [id number]  YOB: 1950  Date of evaluation: 3/21/2023      Procedure Summary     Date: 03/21/23 Room / Location: [facility] MAIN OR 05 / [facility] MAIN OR    Anesthesia Start: 0282 Anesthesia Stop: 1166    Procedures:       RECTAL EXAM UNDER ANESTHESIA (Rectum)      FISTULA PLUG/ FISTULECTOMY (Rectum) Diagnosis:       Perianal fistula      (Perianal fistula [K60.3])    Surgeons: Chris Chisholm MD Responsible Provider: Sloane Baker MD    Anesthesia Type: general ASA Status: 2          Anesthesia Type: No value filed. Natacha Phase I: Natacha Score: 10    Natacha Phase II:        Anesthesia Post Evaluation    Patient location during evaluation: PACU  Patient participation: complete - patient participated  Level of consciousness: awake and alert  Airway patency: patent  Nausea & Vomiting: no nausea and no vomiting  Complications: no  Cardiovascular status: hemodynamically stable  Respiratory status: acceptable  Hydration status: euvolemic  Comments: Blood pressure 137/63, pulse 71, temperature 98.7 °F (37.1 °C), temperature source Temporal, resp. rate 12, height 5' 5\" (1.651 m), weight 216 lb 1.6 oz (98 kg), SpO2 98 %, not currently breastfeeding.       Pt stable for discharge from PACU  Multimodal analgesia pain management approach

## 2023-03-21 NOTE — OP NOTE
Operative Report     Patient: Shonda Horvath MRN: [id number]      YOB: 1950  Age: 68 y.o. Sex: female       Date of Surgery: 3/21/2023     Preoperative Diagnosis: Perianal fistula [K60.3]     Postoperative Diagnosis:  perianal fistula with chronic abscess    Procedure: Anorectal exam  under anesthesia  Fistulectomy  Fistula plug placement    Surgeon(s) and Role:     * Jasbir Huggins MD - Primary    Complications:  none      Procedure Details   Informed consent was obtained previously, and the  patient anesthetized and placed in lithotomy position with adequate padding to all pressure points and the buttocks and perineum were prepped and draped. Anoscopy was performed revealing rather diffuse redundant perianal skin, grade 1 right posterior external hemorrhoids, and several areas of internal hemorrhoidal engorgement. An anterior midline chronic scar was noted, somewhat umbilicated suggestive of the internal fistulous orifice. The skin site at 4:00, 5cm from the anal verge, was probed and a well-developed tract was identified which extended to the anterior midline. Beginning at the skin site, the fistula was opened over the probe towards the internal opening. Unfortunately, at the anal margin it veered deep and appeared to traverse 30-50% of the sphincter complex thickness thus further fistulotomy was not done. The opened area revealed a thick fibrous tract lined with copious granulation tissue, which had areas of purulence. This tissue was excised, completing a partial fistulectomy. The site was then loosely closed with layers of 3-0 vicryl and widely spaced and incomplete closure of the skin. The fistula plug was opened and the brush was used to debride the sphincteric, intact portion of the fistula.   The plug was then brought through the fistula tract, seating it at the internal orifice of the fistula and extending it along the fistulectomy path essentially to the skin, where

## 2023-03-21 NOTE — ANESTHESIA PRE PROCEDURE
12:17 PM    CREATININE 1.10 10/27/2022 12:17 PM    GFRAA >60 06/27/2022 02:08 PM    AGRATIO 0.8 02/28/2022 01:11 PM    LABGLOM 53 10/27/2022 12:17 PM    GLUCOSE 116 10/27/2022 12:17 PM    PROT 7.2 10/27/2022 12:17 PM    CALCIUM 9.7 10/27/2022 12:17 PM    BILITOT 0.6 10/27/2022 12:17 PM    ALKPHOS 111 10/27/2022 12:17 PM    ALKPHOS 94 02/28/2022 01:11 PM    AST 25 10/27/2022 12:17 PM    ALT 21 10/27/2022 12:17 PM       POC Tests: No results for input(s): POCGLU, POCNA, POCK, POCCL, POCBUN, POCHEMO, POCHCT in the last 72 hours. Coags: No results found for: PROTIME, INR, APTT    HCG (If Applicable): No results found for: PREGTESTUR, PREGSERUM, HCG, HCGQUANT     ABGs: No results found for: PHART, PO2ART, RJL4RSN, SIN5UBO, BEART, I1CYAQXO     Type & Screen (If Applicable):  No results found for: LABABO, LABRH    Drug/Infectious Status (If Applicable):  No results found for: HIV, HEPCAB    COVID-19 Screening (If Applicable):   Lab Results   Component Value Date/Time    COVID19 Not Detected 10/22/2020 10:37 AM           Anesthesia Evaluation  Patient summary reviewed and Nursing notes reviewed no history of anesthetic complications:   Airway: Mallampati: II  TM distance: >3 FB   Neck ROM: full  Mouth opening: > = 3 FB   Dental: normal exam         Pulmonary:Negative Pulmonary ROS breath sounds clear to auscultation                             Cardiovascular:  Exercise tolerance: good (>4 METS),   (+) hypertension:, hyperlipidemia        Rhythm: regular  Rate: normal                 ROS comment: Echo 2020 - normal EF, diastolic dysfxn, no sig valve dz     Neuro/Psych:   (+) depression/anxiety             GI/Hepatic/Renal:            ROS comment: BMI 35. Endo/Other:    (+) hypothyroidism::., .                 Abdominal:             Vascular: negative vascular ROS. Other Findings:           Anesthesia Plan      general     ASA 2       Induction: intravenous.     MIPS: Postoperative opioids intended and

## 2023-03-21 NOTE — INTERVAL H&P NOTE
Update History & Physical    The patient's History and Physical was reviewed with the patient and I examined the patient. There was no change. The surgical site was confirmed by the patient and me. Plan: The risks, benefits, expected outcome, and alternative to the recommended procedure have been discussed with the patient. Patient understands and wants to proceed with the procedure.      Suhail Baldwin MD  3/21/2023

## 2023-03-21 NOTE — DISCHARGE INSTRUCTIONS
Yadi [employer]ting M.D.  [phone]    Instructions following Rectal Surgery: This sheet gives you a general idea about how long it will take for you to recover. But each person recovers at a different pace. Follow the steps below to get better as quickly as possible. How can you care for yourself at home? Activity  ? Rest when you feel tired. Getting enough sleep will help you recover. ? Try to walk each day. Start out by walking a little more than you did the day before. Bit by bit, increase the amount you walk. Walking boosts blood flow and helps prevent pneumonia and constipation. ? Avoid strenuous activities, such as biking, jogging, weight lifting, or aerobic exercise, until cleared by your surgeon. Also, avoid lifting anything that would make you strain- do not lift anything more than 30 pounds. You may take showers as usual but NO SOAKING. Pat your anal area dry when you are done. ? You may drive when you are no longer taking prescription pain medicine and can quickly move your foot from the gas pedal to the brake. You must also be able to sit comfortably for a long period of time, even if you do not plan to go far. You might get caught in traffic. Diet  ? Drink plenty of fluids. ? Start taking a fiber supplement every day and begin adding high-fiber foods to your diet 2 or 3 days after your surgery. This will make bowel movements easier and reduce the chance that you will have problems again. ? You may notice that your bowel movements are not regular right after your surgery. This is common. Try to avoid constipation and straining with bowel movements. If you have not had a bowel movement after a couple of days, feel free to take a mild laxative. It is also ok to start taking an over-the-counter stool softener the day of surgery. Medications  ? Take pain medicines exactly as directed.    ? If you are not taking a prescription pain medicine, take an over-the-counter medicine such as

## 2023-03-30 ENCOUNTER — OFFICE VISIT (OUTPATIENT)
Dept: SURGERY | Age: 73
End: 2023-03-30

## 2023-03-30 DIAGNOSIS — K60.3 PERIANAL FISTULA: Primary | ICD-10-CM

## 2023-03-30 PROCEDURE — 99024 POSTOP FOLLOW-UP VISIT: CPT | Performed by: SURGERY

## 2023-04-03 NOTE — PROGRESS NOTES
S/p exam under anesthesia with partial anal fistulotomy, with fistula plug in trans-sphincteric portion. Reports some pain, with persistent pressure, and decreasing bleeding/draining. She does not report any continence concerns    Exam:  In NAD  Perineum reveals intact suture line of left perianal area, slightly open peripherally. No perianal cellulitis    1. Perianal fistula       Plan:   Will remove sutures in another week (4/7), which should minimize risk of skin separation and plug exposure  Again reviewed natural history of plug- site should heal then potentially begin to drain at 4-6wks postop    Follow-up and Dispositions    Return in about 4 weeks (around 4/27/2023) for nurse visit 4/7 for suture removal.

## 2023-04-07 ENCOUNTER — NURSE ONLY (OUTPATIENT)
Dept: SURGERY | Age: 73
End: 2023-04-07

## 2023-04-07 DIAGNOSIS — K60.3 PERIANAL FISTULA: Primary | ICD-10-CM

## 2023-04-07 NOTE — PROGRESS NOTES
Patient presents today for suture removal following an EUA. She had 3 sutures and they were removed. The patient tolerated well.  She denies any pain, fevers, chills, n/v.

## 2023-04-27 ENCOUNTER — OFFICE VISIT (OUTPATIENT)
Dept: SURGERY | Age: 73
End: 2023-04-27

## 2023-04-27 DIAGNOSIS — K60.3 PERIANAL FISTULA: Primary | ICD-10-CM

## 2023-04-27 PROCEDURE — 99024 POSTOP FOLLOW-UP VISIT: CPT | Performed by: SURGERY

## 2023-04-27 NOTE — PROGRESS NOTES
S/p fistulectomy and fistula plug placement 3/21  Continues to have painless bleeding, not just w/BM otherwise feels well and is pleased with outcome    Exam:  No perianal cellulitis  Fistula external opening sealed. Distal 1/2 of fistulectomy wound closed. Proximal 1/2 of fistulectomy remains open, to anocutaneous junction, with good granulation tissue in defect    1. Perianal fistula       Wound healing well  May take several weeks for final wound closure due to location of wound. Bleeding/discharge is c/w open perianal wound portion.     If continues to have bleeding >6-8 weeks, return for exam- may need silver nitrate

## 2023-05-31 NOTE — NURSE NAVIGATOR
Consult right breast cancer.  F/u Dr. Geiger 3-2-21.  S/p lumpectomy 10-5-20.  Chemo end 21.  Pt presented in wheelchair with daughter.  She is tearful and states she feels weak.  Daughter states pt is not eating or drinking well.  Pt is having small amount of loose stool about five times daily.  Previous chemo/RT to left breast in  at [facility] per pt.  Pt is  3/ para 3.  States one child  soon after birth.  Started period around age 11.  Periods stopped during chemo .  Pt has history of oral contraceptive use.  No history of HRT.  CONSENTS SIGNED FOR RIGHT BREAST RT.  CT SIM SCHEDULED Wednesday, 21 AT 2 PM.  APT GIVEN TO PT.  Pt to have labs and iv fluids following consult.  Orders placed by NP.    Katy Chowdary RN      
Nonspecific chest pain

## 2023-06-02 ENCOUNTER — TRANSCRIBE ORDERS (OUTPATIENT)
Facility: HOSPITAL | Age: 73
End: 2023-06-02

## 2023-06-02 DIAGNOSIS — Z12.31 VISIT FOR SCREENING MAMMOGRAM: Primary | ICD-10-CM

## 2023-07-12 ENCOUNTER — OFFICE VISIT (OUTPATIENT)
Dept: ORTHOPEDIC SURGERY | Age: 73
End: 2023-07-12
Payer: MEDICARE

## 2023-07-12 DIAGNOSIS — M17.12 ARTHRITIS OF LEFT KNEE: ICD-10-CM

## 2023-07-12 DIAGNOSIS — M25.562 CHRONIC PAIN OF BOTH KNEES: Primary | ICD-10-CM

## 2023-07-12 DIAGNOSIS — M17.11 ARTHRITIS OF RIGHT KNEE: ICD-10-CM

## 2023-07-12 DIAGNOSIS — M25.561 CHRONIC PAIN OF BOTH KNEES: Primary | ICD-10-CM

## 2023-07-12 DIAGNOSIS — G89.29 CHRONIC PAIN OF BOTH KNEES: Primary | ICD-10-CM

## 2023-07-12 PROCEDURE — 1123F ACP DISCUSS/DSCN MKR DOCD: CPT | Performed by: ORTHOPAEDIC SURGERY

## 2023-07-12 PROCEDURE — 99203 OFFICE O/P NEW LOW 30 MIN: CPT | Performed by: ORTHOPAEDIC SURGERY

## 2023-07-12 NOTE — PROGRESS NOTES
leukemia     Cancer Paternal Uncle         leukemia     Cancer Paternal Uncle         prostate    Cancer Paternal Uncle         pancreatic    Cancer Brother         prostate     Social History     Socioeconomic History    Marital status:      Spouse name: Not on file    Number of children: Not on file    Years of education: Not on file    Highest education level: Not on file   Occupational History    Not on file   Tobacco Use    Smoking status: Never    Smokeless tobacco: Never   Substance and Sexual Activity    Alcohol use: Never    Drug use: Never    Sexual activity: Not on file   Other Topics Concern    Not on file   Social History Narrative    Not on file     Social Determinants of Health     Financial Resource Strain: Not on file   Food Insecurity: Not on file   Transportation Needs: Not on file   Physical Activity: Not on file   Stress: Not on file   Social Connections: Not on file   Intimate Partner Violence: Not on file   Housing Stability: Not on file       Review of Systems:  As per HPI. Pertinent positives and negatives are addressed with the patient, particularly those related to musculoskeletal concerns. Non-orthopaedic concerns were referred back to the primary care physician. PHYSICAL EXAMINATION:   The patient appears their stated age and they are in no distress. The lower extremities are as described below. Circulation is normal with palpable pedal pulses bilaterally and no edema. There is no lymph adenopathy in the popliteal or malleolar region. The skin is without stasis disease distally bilaterally. Hip ROM is smooth and painless. Knee range of motion is 0-120 degrees on the right and 0-120 degrees on the left. bilateral knee: There is 2mm of anterior/posterior translation and 2mm of medial/lateral instability bilaterally. There is 2 degrees of varus alignment in the bilateral knee. There is some pain to palpation over the medial joint line.   Limb lengths are equal.  The

## 2023-09-29 ENCOUNTER — HOSPITAL ENCOUNTER (EMERGENCY)
Age: 73
Discharge: HOME | End: 2023-09-29
Attending: EMERGENCY MEDICINE
Payer: MEDICARE

## 2023-09-29 ENCOUNTER — APPOINTMENT (OUTPATIENT)
Dept: GENERAL RADIOLOGY | Age: 73
End: 2023-09-29
Payer: MEDICARE

## 2023-09-29 VITALS
WEIGHT: 225 LBS | RESPIRATION RATE: 22 BRPM | OXYGEN SATURATION: 97 % | TEMPERATURE: 98.8 F | HEART RATE: 80 BPM | BODY MASS INDEX: 37.49 KG/M2 | SYSTOLIC BLOOD PRESSURE: 144 MMHG | DIASTOLIC BLOOD PRESSURE: 66 MMHG | HEIGHT: 65 IN

## 2023-09-29 DIAGNOSIS — I50.9 CONGESTIVE HEART FAILURE, UNSPECIFIED HF CHRONICITY, UNSPECIFIED HEART FAILURE TYPE (HCC): Primary | ICD-10-CM

## 2023-09-29 LAB
ALBUMIN SERPL-MCNC: 3.3 G/DL (ref 3.2–4.6)
ALBUMIN/GLOB SERPL: 0.8 (ref 0.4–1.6)
ALP SERPL-CCNC: 87 U/L (ref 50–136)
ALT SERPL-CCNC: 29 U/L (ref 12–65)
ANION GAP SERPL CALC-SCNC: 7 MMOL/L (ref 2–11)
AST SERPL-CCNC: 35 U/L (ref 15–37)
BASOPHILS # BLD: 0 K/UL (ref 0–0.2)
BASOPHILS NFR BLD: 1 % (ref 0–2)
BILIRUB SERPL-MCNC: 0.5 MG/DL (ref 0.2–1.1)
BUN SERPL-MCNC: 9 MG/DL (ref 8–23)
CALCIUM SERPL-MCNC: 9.6 MG/DL (ref 8.3–10.4)
CHLORIDE SERPL-SCNC: 110 MMOL/L (ref 101–110)
CO2 SERPL-SCNC: 25 MMOL/L (ref 21–32)
CREAT SERPL-MCNC: 0.9 MG/DL (ref 0.6–1)
DIFFERENTIAL METHOD BLD: ABNORMAL
EKG ATRIAL RATE: 86 BPM
EKG DIAGNOSIS: NORMAL
EKG P AXIS: 55 DEGREES
EKG P-R INTERVAL: 128 MS
EKG Q-T INTERVAL: 426 MS
EKG QRS DURATION: 126 MS
EKG QTC CALCULATION (BAZETT): 509 MS
EKG R AXIS: 63 DEGREES
EKG T AXIS: 48 DEGREES
EKG VENTRICULAR RATE: 86 BPM
EOSINOPHIL # BLD: 0.2 K/UL (ref 0–0.8)
EOSINOPHIL NFR BLD: 4 % (ref 0.5–7.8)
ERYTHROCYTE [DISTWIDTH] IN BLOOD BY AUTOMATED COUNT: 13.4 % (ref 11.9–14.6)
GLOBULIN SER CALC-MCNC: 4.3 G/DL (ref 2.8–4.5)
GLUCOSE SERPL-MCNC: 83 MG/DL (ref 65–100)
HCT VFR BLD AUTO: 34.8 % (ref 35.8–46.3)
HGB BLD-MCNC: 11.3 G/DL (ref 11.7–15.4)
IMM GRANULOCYTES # BLD AUTO: 0 K/UL (ref 0–0.5)
IMM GRANULOCYTES NFR BLD AUTO: 0 % (ref 0–5)
LYMPHOCYTES # BLD: 1.3 K/UL (ref 0.5–4.6)
LYMPHOCYTES NFR BLD: 26 % (ref 13–44)
MAGNESIUM SERPL-MCNC: 2.3 MG/DL (ref 1.8–2.4)
MCH RBC QN AUTO: 31.5 PG (ref 26.1–32.9)
MCHC RBC AUTO-ENTMCNC: 32.5 G/DL (ref 31.4–35)
MCV RBC AUTO: 96.9 FL (ref 82–102)
MONOCYTES # BLD: 0.4 K/UL (ref 0.1–1.3)
MONOCYTES NFR BLD: 7 % (ref 4–12)
NEUTS SEG # BLD: 3 K/UL (ref 1.7–8.2)
NEUTS SEG NFR BLD: 62 % (ref 43–78)
NRBC # BLD: 0 K/UL (ref 0–0.2)
NT PRO BNP: 356 PG/ML (ref 5–125)
PLATELET # BLD AUTO: 72 K/UL (ref 150–450)
PMV BLD AUTO: 10.5 FL (ref 9.4–12.3)
POTASSIUM SERPL-SCNC: 4.4 MMOL/L (ref 3.5–5.1)
PROT SERPL-MCNC: 7.6 G/DL (ref 6.3–8.2)
RBC # BLD AUTO: 3.59 M/UL (ref 4.05–5.2)
SODIUM SERPL-SCNC: 142 MMOL/L (ref 133–143)
TROPONIN I SERPL HS-MCNC: 17.5 PG/ML (ref 0–14)
TROPONIN I SERPL HS-MCNC: 19.2 PG/ML (ref 0–14)
WBC # BLD AUTO: 4.9 K/UL (ref 4.3–11.1)

## 2023-09-29 PROCEDURE — 83735 ASSAY OF MAGNESIUM: CPT

## 2023-09-29 PROCEDURE — 93010 ELECTROCARDIOGRAM REPORT: CPT | Performed by: INTERNAL MEDICINE

## 2023-09-29 PROCEDURE — 83880 ASSAY OF NATRIURETIC PEPTIDE: CPT

## 2023-09-29 PROCEDURE — 80053 COMPREHEN METABOLIC PANEL: CPT

## 2023-09-29 PROCEDURE — 84484 ASSAY OF TROPONIN QUANT: CPT

## 2023-09-29 PROCEDURE — 94762 N-INVAS EAR/PLS OXIMTRY CONT: CPT

## 2023-09-29 PROCEDURE — 93005 ELECTROCARDIOGRAM TRACING: CPT | Performed by: EMERGENCY MEDICINE

## 2023-09-29 PROCEDURE — 71045 X-RAY EXAM CHEST 1 VIEW: CPT

## 2023-09-29 PROCEDURE — 85025 COMPLETE CBC W/AUTO DIFF WBC: CPT

## 2023-09-29 PROCEDURE — 99285 EMERGENCY DEPT VISIT HI MDM: CPT

## 2023-09-29 RX ORDER — FUROSEMIDE 20 MG/1
20 TABLET ORAL DAILY
Qty: 7 TABLET | Refills: 0 | Status: SHIPPED | OUTPATIENT
Start: 2023-09-29 | End: 2023-10-06

## 2023-09-29 ASSESSMENT — LIFESTYLE VARIABLES
HOW MANY STANDARD DRINKS CONTAINING ALCOHOL DO YOU HAVE ON A TYPICAL DAY: PATIENT DOES NOT DRINK
HOW OFTEN DO YOU HAVE A DRINK CONTAINING ALCOHOL: NEVER

## 2023-09-29 NOTE — ED TRIAGE NOTES
Pt presents to the ED - was sent from PCP office due to abnormal EKG. Patient states she has had intermittent chest pain and feeling SOB while ambulated x1 year. Denies any cardiac history but does state mother and grandmother passed of heart attacks.

## 2023-09-30 NOTE — DISCHARGE INSTRUCTIONS
Follow low-sodium diet drink plenty of water use Lasix 1 pill daily for the next week. You will likely get a call from the cardiology office on Monday to arrange follow-up appointment return to ER for any worsening symptoms.   You may continue all at home medications

## 2023-09-30 NOTE — ED PROVIDER NOTES
Emergency Department Provider Note       PCP: Arlene Herzog MD   Age: 68 y.o. Sex: female     DISPOSITION Decision To Discharge 09/29/2023 08:24:55 PM       ICD-10-CM    1. Congestive heart failure, unspecified HF chronicity, unspecified heart failure type ([address])  I50.[address] [place] [address] Decision Making     Complexity of Problems Addressed:  1 or more acute illnesses that pose a threat to life or bodily function. Data Reviewed and Analyzed:   I independently ordered and reviewed each unique test.  I reviewed external records: provider visit note from PCP. I independently ordered and interpreted the ED EKG in the absence of a Cardiologist.    Rate: 86  EKG Interpretation: EKG Interpretation: left bundle branch block  ST Segments: Normal ST segments - NO STEMI      I interpreted the X-rays chest x-ray shows cardiomegaly with congestion no obvious pleural effusions. I interpreted the CBC CMP unremarkable BNP.  356, troponins x2 , 17 and 19    Discussion of management or test interpretation. 77-year-old female with incidental finding of left bundle branch block on EKG at routine doctor's visit today. Troponins x217 and 19 BNP slightly elevated. Patient discussed with Dr. Mac Reeves, will start on Lasix 20 mg a day for the next 5 days will refer to [facility] cardiology patient to continue all other at home medications return to ER for any worsening symptoms      Risk of Complications and/or Morbidity of Patient Management:  Prescription drug management performed. Shared medical decision making was utilized in creating the patients health plan today. Is this patient to be included in the SEP-1 core measure due to severe sepsis or septic shock? No Exclusion criteria - the patient is NOT to be included for SEP-1 Core Measure due to: Infection is not suspected      History      Patient to ER sent from primary care office with abnormal EKG.   Patient had her normally

## 2023-10-03 ENCOUNTER — HOSPITAL ENCOUNTER (OUTPATIENT)
Dept: MAMMOGRAPHY | Age: 73
Discharge: HOME | End: 2023-10-06
Payer: MEDICARE

## 2023-10-03 ENCOUNTER — OFFICE VISIT (OUTPATIENT)
Age: 73
End: 2023-10-03
Payer: MEDICARE

## 2023-10-03 VITALS
HEART RATE: 79 BPM | HEIGHT: 65 IN | DIASTOLIC BLOOD PRESSURE: 50 MMHG | BODY MASS INDEX: 37.49 KG/M2 | WEIGHT: 225 LBS | SYSTOLIC BLOOD PRESSURE: 116 MMHG

## 2023-10-03 DIAGNOSIS — Z09 HOSPITAL DISCHARGE FOLLOW-UP: ICD-10-CM

## 2023-10-03 DIAGNOSIS — I50.32 CHRONIC DIASTOLIC CONGESTIVE HEART FAILURE (HCC): Primary | ICD-10-CM

## 2023-10-03 DIAGNOSIS — I44.7 LBBB (LEFT BUNDLE BRANCH BLOCK): ICD-10-CM

## 2023-10-03 DIAGNOSIS — Z12.31 VISIT FOR SCREENING MAMMOGRAM: ICD-10-CM

## 2023-10-03 DIAGNOSIS — I10 PRIMARY HYPERTENSION: ICD-10-CM

## 2023-10-03 PROCEDURE — 99204 OFFICE O/P NEW MOD 45 MIN: CPT | Performed by: INTERNAL MEDICINE

## 2023-10-03 PROCEDURE — 3078F DIAST BP <80 MM HG: CPT | Performed by: INTERNAL MEDICINE

## 2023-10-03 PROCEDURE — 3074F SYST BP LT 130 MM HG: CPT | Performed by: INTERNAL MEDICINE

## 2023-10-03 PROCEDURE — 1123F ACP DISCUSS/DSCN MKR DOCD: CPT | Performed by: INTERNAL MEDICINE

## 2023-10-03 PROCEDURE — 77063 BREAST TOMOSYNTHESIS BI: CPT

## 2023-10-03 PROCEDURE — 1111F DSCHRG MED/CURRENT MED MERGE: CPT | Performed by: INTERNAL MEDICINE

## 2023-10-03 RX ORDER — LEVOTHYROXINE SODIUM 112 MCG
TABLET ORAL
COMMUNITY
Start: 2023-07-25

## 2023-10-03 RX ORDER — BENAZEPRIL HYDROCHLORIDE 20 MG/1
TABLET ORAL
COMMUNITY
Start: 2023-05-05

## 2023-10-03 RX ORDER — ACETAMINOPHEN 325 MG/1
TABLET ORAL
COMMUNITY
Start: 2022-01-27

## 2023-10-03 RX ORDER — DIPHENOXYLATE HYDROCHLORIDE AND ATROPINE SULFATE 2.5; .025 MG/1; MG/1
TABLET ORAL
COMMUNITY

## 2023-10-03 RX ORDER — FEXOFENADINE HCL 180 MG/1
180 TABLET ORAL DAILY
COMMUNITY

## 2023-10-03 RX ORDER — HYDROCHLOROTHIAZIDE 25 MG/1
TABLET ORAL
COMMUNITY

## 2023-10-03 RX ORDER — THIAMINE HCL 100 MG
TABLET ORAL
COMMUNITY

## 2023-10-03 RX ORDER — CYCLOSPORINE 0.5 MG/ML
EMULSION OPHTHALMIC
COMMUNITY
Start: 2023-05-15

## 2023-10-03 NOTE — PROGRESS NOTES
diuretic. DHF? ? Lasix PRN now. Follow. HTN:  On Ace. CP:  Given these risk factors and symptoms as outlined, plan for NST. We did review options of NST, LHC, other stress testing and agreed to proceed with NST in our office. To ER for worsening angina. Plan on definitive LHC for worsening angina. Low plts:  need to follow. See back after echo and NST. The patient has been instructed to call with any angina or equivalent as reviewed today. All questions were answered with the patient voicing complete understanding. Patient has been instructed and agrees to call our office with any issues or other concerns related to their cardiac condition(s) and/or complaint(s).         Return for Return After Test.       Kevan Anthony DO  10/3/2023

## 2023-10-10 ENCOUNTER — HOSPITAL ENCOUNTER (OUTPATIENT)
Dept: MAMMOGRAPHY | Age: 73
Discharge: HOME | End: 2023-10-13
Payer: MEDICARE

## 2023-10-10 DIAGNOSIS — R92.8 ABNORMAL SCREENING MAMMOGRAM: ICD-10-CM

## 2023-10-10 PROCEDURE — 77065 DX MAMMO INCL CAD UNI: CPT

## 2023-10-16 ENCOUNTER — TELEPHONE (OUTPATIENT)
Age: 73
End: 2023-10-16

## 2023-10-16 NOTE — TELEPHONE ENCOUNTER
Patient called stating she would like to know the results from a procedure on Friday 10/13/23. Please call and advise.

## 2023-10-17 NOTE — TELEPHONE ENCOUNTER
NST was ok, normal perfusion. Follow for more angina, see me as planned. Fine to drive from heart standpoint.    Thanks

## 2023-12-14 ENCOUNTER — OFFICE VISIT (OUTPATIENT)
Age: 73
End: 2023-12-14
Payer: MEDICARE

## 2023-12-14 VITALS
HEART RATE: 72 BPM | HEIGHT: 65 IN | SYSTOLIC BLOOD PRESSURE: 118 MMHG | DIASTOLIC BLOOD PRESSURE: 60 MMHG | BODY MASS INDEX: 38.24 KG/M2 | WEIGHT: 229.5 LBS

## 2023-12-14 DIAGNOSIS — I10 PRIMARY HYPERTENSION: ICD-10-CM

## 2023-12-14 DIAGNOSIS — I50.32 CHRONIC DIASTOLIC CONGESTIVE HEART FAILURE (HCC): Primary | ICD-10-CM

## 2023-12-14 DIAGNOSIS — I44.7 LBBB (LEFT BUNDLE BRANCH BLOCK): ICD-10-CM

## 2023-12-14 PROCEDURE — 99214 OFFICE O/P EST MOD 30 MIN: CPT | Performed by: INTERNAL MEDICINE

## 2023-12-14 PROCEDURE — 1123F ACP DISCUSS/DSCN MKR DOCD: CPT | Performed by: INTERNAL MEDICINE

## 2023-12-14 PROCEDURE — 3074F SYST BP LT 130 MM HG: CPT | Performed by: INTERNAL MEDICINE

## 2023-12-14 PROCEDURE — 3078F DIAST BP <80 MM HG: CPT | Performed by: INTERNAL MEDICINE

## 2023-12-14 RX ORDER — FUROSEMIDE 20 MG/1
20 TABLET ORAL DAILY
Qty: 30 TABLET | Refills: 0 | Status: SHIPPED | OUTPATIENT
Start: 2023-12-14 | End: 2024-01-13

## 2023-12-14 NOTE — PROGRESS NOTES
[address] [facility], [facility], [address]  PHONE: [phone]     23    NAME:  Rhonda Yanez  : 1950  MRN: [id number]       SUBJECTIVE:   Rhonda Yanez is a 68 y.o. female seen for a follow up visit regarding the following:     Chief Complaint   Patient presents with    LBBB     Echo and NST        HPI:   Breast CA in , CA free now. Echo 10/2023: normal EF, mod PHTN  NST 10/2023 Normal pharmacological myocardial perfusion study. Findings suggest a low risk of cardiac events. Some GRAVES, some some SOB. Some CP comes and goes. Patient denies recent history of orthopnea, PND, excessive dizziness and/or syncope. On lasix PRN (this is new) and HCTZ daily. NT pro BNP was 356. Past Medical History, Past Surgical History, Family history, Social History, and Medications were all reviewed with the patient today and updated as necessary.      Current Outpatient Medications   Medication Sig Dispense Refill    acetaminophen (TYLENOL) 325 MG tablet 2 tablets as needed Orally every 6 hrs      benazepril (LOTENSIN) 20 MG tablet 1 tablet for blood pressure Orally Once a day for 90 days      hydroCHLOROthiazide (HYDRODIURIL) 25 MG tablet TAKE 1 TABLET BY MOUTH DAILY Oral      SYNTHROID 112 MCG tablet       fexofenadine (ALLEGRA) 180 MG tablet Take 1 tablet by mouth daily      Magnesium 500 MG TABS Take by mouth      Potassium Gluconate (QC POTASSIUM PO) Take by mouth      DULoxetine (CYMBALTA) 30 MG extended release capsule Take 1 capsule by mouth daily      traMADol (ULTRAM) 50 MG tablet       Calcium Carbonate-Vitamin D (CALCIUM-VITAMIN D) 600-125 MG-UNIT TABS Take 1 tablet by mouth      ergocalciferol (ERGOCALCIFEROL) 1.25 MG (52485 UT) capsule TK 1 C PO 1 TIME WEEKLY      famotidine (PEPCID) 20 MG tablet Take 1 tablet by mouth daily as needed      rosuvastatin (CRESTOR) 10 MG tablet Take 1 tablet by mouth every evening      cycloSPORINE (RESTASIS)

## 2024-02-29 RX ORDER — FUROSEMIDE 20 MG/1
20 TABLET ORAL DAILY
Qty: 90 TABLET | Refills: 1 | Status: SHIPPED | OUTPATIENT
Start: 2024-02-29 | End: 2024-08-27

## 2024-02-29 NOTE — TELEPHONE ENCOUNTER
Patient needs refill of Furosemide 20 mg one tablet every other day, she has been out for quite a while the last 3-4 days her legs are swelling quite a lot.  She needs a new RX, Dr. Clark advised her to take one tablet every other day which was doing well until she ran out of it 3-4 weeks ago. New Pharmacy below:    [facility] at [address] [place] [address]

## 2024-05-02 ENCOUNTER — TRANSCRIBE ORDERS (OUTPATIENT)
Dept: SCHEDULING | Age: 74
End: 2024-05-02

## 2024-05-02 DIAGNOSIS — Z12.31 SCREENING MAMMOGRAM FOR HIGH-RISK PATIENT: Primary | ICD-10-CM

## 2024-06-18 ENCOUNTER — OFFICE VISIT (OUTPATIENT)
Age: 74
End: 2024-06-18
Payer: MEDICARE

## 2024-06-18 VITALS
SYSTOLIC BLOOD PRESSURE: 108 MMHG | BODY MASS INDEX: 36.82 KG/M2 | DIASTOLIC BLOOD PRESSURE: 60 MMHG | HEART RATE: 82 BPM | WEIGHT: 221 LBS | HEIGHT: 65 IN

## 2024-06-18 DIAGNOSIS — I44.7 LBBB (LEFT BUNDLE BRANCH BLOCK): ICD-10-CM

## 2024-06-18 DIAGNOSIS — I50.32 CHRONIC DIASTOLIC CONGESTIVE HEART FAILURE (HCC): ICD-10-CM

## 2024-06-18 DIAGNOSIS — I27.20 PULMONARY HYPERTENSION (HCC): ICD-10-CM

## 2024-06-18 DIAGNOSIS — I10 PRIMARY HYPERTENSION: Primary | ICD-10-CM

## 2024-06-18 LAB
ALBUMIN SERPL-MCNC: 3.7 G/DL (ref 3.2–4.6)
ALBUMIN/GLOB SERPL: 1.1 (ref 1–1.9)
ALP SERPL-CCNC: 93 U/L (ref 35–104)
ALT SERPL-CCNC: 15 U/L (ref 12–65)
ANION GAP SERPL CALC-SCNC: 10 MMOL/L (ref 9–18)
AST SERPL-CCNC: 25 U/L (ref 15–37)
BILIRUB SERPL-MCNC: 0.5 MG/DL (ref 0–1.2)
BUN SERPL-MCNC: 10 MG/DL (ref 8–23)
CALCIUM SERPL-MCNC: 9.6 MG/DL (ref 8.8–10.2)
CHLORIDE SERPL-SCNC: 104 MMOL/L (ref 98–107)
CO2 SERPL-SCNC: 26 MMOL/L (ref 20–28)
CREAT SERPL-MCNC: 1.2 MG/DL (ref 0.6–1.1)
GLOBULIN SER CALC-MCNC: 3.4 G/DL (ref 2.3–3.5)
GLUCOSE SERPL-MCNC: 76 MG/DL (ref 70–99)
MAGNESIUM SERPL-MCNC: 2 MG/DL (ref 1.8–2.4)
POTASSIUM SERPL-SCNC: 4.5 MMOL/L (ref 3.5–5.1)
PROT SERPL-MCNC: 7 G/DL (ref 6.3–8.2)
SODIUM SERPL-SCNC: 140 MMOL/L (ref 136–145)
TSH, 3RD GENERATION: 0.3 UIU/ML (ref 0.27–4.2)

## 2024-06-18 PROCEDURE — 3078F DIAST BP <80 MM HG: CPT | Performed by: INTERNAL MEDICINE

## 2024-06-18 PROCEDURE — 3074F SYST BP LT 130 MM HG: CPT | Performed by: INTERNAL MEDICINE

## 2024-06-18 PROCEDURE — 1123F ACP DISCUSS/DSCN MKR DOCD: CPT | Performed by: INTERNAL MEDICINE

## 2024-06-18 PROCEDURE — 99214 OFFICE O/P EST MOD 30 MIN: CPT | Performed by: INTERNAL MEDICINE

## 2024-06-18 NOTE — PROGRESS NOTES
[address] [facility], [place] [address]  PHONE: [phone]     24    NAME:  Rosey Pavon  : 1950  MRN: [id number]       SUBJECTIVE:   Rosey Pavon is a 74 y.o. female seen for a follow up visit regarding the following:     Chief Complaint   Patient presents with    Congestive Heart Failure    6 Month Follow-Up       HPI: Breast CA in , CA free now.   Echo 10/2023: normal EF, mod PHTN  NST 10/2023 low risk of cardiac events.         Some GRAVES, some some SOB.   Never smoked.  Former cotton .    Some CP comes and goes.   Patient denies recent history of orthopnea, PND, excessive dizziness and/or syncope.  On daily lasix now.   Off HCTZ.               Past Medical History, Past Surgical History, Family history, Social History, and Medications were all reviewed with the patient today and updated as necessary.     Current Outpatient Medications   Medication Sig Dispense Refill    Cholecalciferol (VITAMIN D3) 250 MCG (10038 UT) CAPS Take by mouth 1 cap 5 days per week      furosemide (LASIX) 20 MG tablet Take 1 tablet by mouth daily 90 tablet 1    acetaminophen (TYLENOL) 325 MG tablet 2 tablets as needed Orally every 6 hrs      benazepril (LOTENSIN) 20 MG tablet 1 tablet for blood pressure Orally Once a day for 90 days      diclofenac sodium (VOLTAREN) 1 % GEL       hydroCHLOROthiazide (HYDRODIURIL) 25 MG tablet TAKE 1 TABLET BY MOUTH DAILY Oral      SYNTHROID 112 MCG tablet       fexofenadine (ALLEGRA) 180 MG tablet Take 1 tablet by mouth daily      Magnesium 500 MG TABS Take by mouth      Potassium Gluconate (QC POTASSIUM PO) Take by mouth      DULoxetine (CYMBALTA) 30 MG extended release capsule Take 1 capsule by mouth daily      traMADol (ULTRAM) 50 MG tablet       Calcium Carbonate-Vitamin D (CALCIUM-VITAMIN D) 600-125 MG-UNIT TABS Take 1 tablet by mouth      famotidine (PEPCID) 20 MG tablet Take 1 tablet by mouth daily as needed      rosuvastatin

## 2024-08-19 ENCOUNTER — NURSE ONLY (OUTPATIENT)
Dept: PULMONOLOGY | Age: 74
End: 2024-08-19
Payer: MEDICARE

## 2024-08-19 ENCOUNTER — OFFICE VISIT (OUTPATIENT)
Dept: PULMONOLOGY | Age: 74
End: 2024-08-19

## 2024-08-19 VITALS
TEMPERATURE: 98.2 F | OXYGEN SATURATION: 97 % | BODY MASS INDEX: 37.65 KG/M2 | HEART RATE: 82 BPM | HEIGHT: 65 IN | RESPIRATION RATE: 18 BRPM | DIASTOLIC BLOOD PRESSURE: 60 MMHG | WEIGHT: 226 LBS | SYSTOLIC BLOOD PRESSURE: 104 MMHG

## 2024-08-19 DIAGNOSIS — I27.20 PULMONARY HYPERTENSION (HCC): Primary | ICD-10-CM

## 2024-08-19 DIAGNOSIS — R06.02 SOB (SHORTNESS OF BREATH): ICD-10-CM

## 2024-08-19 DIAGNOSIS — I27.20 PULMONARY HYPERTENSION (HCC): ICD-10-CM

## 2024-08-19 LAB
D DIMER PPP FEU-MCNC: 0.59 UG/ML(FEU)
EXPIRATORY TIME: NORMAL
FEF 25-75% %PRED-PRE: NORMAL
FEF 25-75% PRED: NORMAL
FEF 25-75-PRE: NORMAL
FEV1 %PRED-PRE: 76 %
FEV1 PRED: 2.15 L
FEV1/FVC %PRED-PRE: NORMAL
FEV1/FVC PRED: 97 %
FEV1/FVC: 75 %
FEV1: 1.64 L
FVC %PRED-PRE: 78 %
FVC PRED: 2.8 L
FVC: 2.19 L
HCV AB SER QL: NONREACTIVE
HIV 1+2 AB+HIV1 P24 AG SERPL QL IA: NONREACTIVE
HIV 1/2 RESULT COMMENT: NORMAL
NT PRO BNP: 401 PG/ML (ref 0–125)
PEF %PRED-PRE: NORMAL
PEF PRED: NORMAL
PEF-PRE: NORMAL

## 2024-08-19 PROCEDURE — 94618 PULMONARY STRESS TESTING: CPT | Performed by: INTERNAL MEDICINE

## 2024-08-19 ASSESSMENT — PULMONARY FUNCTION TESTS
FEV1_PERCENT_PREDICTED_PRE: 76
FEV1/FVC: 75
FVC_PREDICTED: 2.80
FEV1_PREDICTED: 2.15
FEV1/FVC_PREDICTED: 97
FEV1: 1.64
FVC: 2.19
FVC_PERCENT_PREDICTED_PRE: 78

## 2024-08-19 ASSESSMENT — SLEEP AND FATIGUE QUESTIONNAIRES
HOW LIKELY ARE YOU TO NOD OFF OR FALL ASLEEP IN A CAR, WHILE STOPPED FOR A FEW MINUTES IN TRAFFIC: WOULD NEVER DOZE
HOW LIKELY ARE YOU TO NOD OFF OR FALL ASLEEP WHILE SITTING AND READING: MODERATE CHANCE OF DOZING
HOW LIKELY ARE YOU TO NOD OFF OR FALL ASLEEP WHILE SITTING QUIETLY AFTER LUNCH WITHOUT ALCOHOL: SLIGHT CHANCE OF DOZING
HOW LIKELY ARE YOU TO NOD OFF OR FALL ASLEEP WHILE LYING DOWN TO REST IN THE AFTERNOON WHEN CIRCUMSTANCES PERMIT: HIGH CHANCE OF DOZING
HOW LIKELY ARE YOU TO NOD OFF OR FALL ASLEEP WHEN YOU ARE A PASSENGER IN A CAR FOR AN HOUR WITHOUT A BREAK: WOULD NEVER DOZE
ESS TOTAL SCORE: 6
HOW LIKELY ARE YOU TO NOD OFF OR FALL ASLEEP WHILE SITTING AND TALKING TO SOMEONE: WOULD NEVER DOZE
HOW LIKELY ARE YOU TO NOD OFF OR FALL ASLEEP WHILE SITTING INACTIVE IN A PUBLIC PLACE: WOULD NEVER DOZE
HOW LIKELY ARE YOU TO NOD OFF OR FALL ASLEEP WHILE WATCHING TV: WOULD NEVER DOZE

## 2024-08-19 NOTE — PATIENT INSTRUCTIONS
Your pulmonary hypertension team here at [facility] are Aleja Mendoza MD, Ashley Gavin DNP, AGACNP, and Vero TORO CMA. We will work together to care for you. You can reach us at [phone] or through MyChart messaging.     - we will you let you know the results of your lab work  - we will check complete breathing tests before next visit  - we will also be sending you an overnight oxygen test to do one night while you sleep. Please follow given instructions and send back test after completing it.

## 2024-08-19 NOTE — PROGRESS NOTES
[facility] Pulmonary and Critical Care  [facility]  [address], [address]   [place] [address]  T: [phone]  F: [phone]       6 MINUTE WALK TEST     Patient's Name Rosey Pavon   Age 74 y.o.    Gender female   Height 65   Weight 226 lbs   Ordering Provider  KULWINDER CHEN NP          Time Dyspnea  (Veronica Scale)  Fatigue  (Veronica Scale)  Blood Pressure Heart Rate Oxygen SAT RA or   w / O2?    BASELINE 9:22  3 0 104 / 60 82 97 RA                                                                              POST TEST  9:28 5 3 136 / 56 114 94 RA     Does the patient use any walking aids? YES   - CANE .   Medications taken before test are up to date:  No  Supplemental oxygen during the test: NO    Stopped or paused before 6 minutes? No  Other symptoms at end of exercise: Other. Knee pain    Number of complete laps: 4 x 60 meters = 240 meters + final partial lap: 51 meters = 291 meters    Total distance walked in 6 minutes:  291  Meters  Predicted distance: 353 meters    Percent of predicted distance: 82 %                Tech comments: Patient give good effort doing 6mins walk. She complain knee pain while walking.    Test Performed by: LUIS KIMBALL MA

## 2024-08-19 NOTE — PROGRESS NOTES
Patient Name:  Rosey Pavon                               YOB: 1950  MRN: [id number]                                                Office Visit 8/19/2024    ASSESSMENT AND PLAN:  (Medical Decision Making)      1. Pulmonary hypertension (HCC)  Risk factors for group 1, 3, and 5. Will need more testing to narrow down causes of elevated RVSP. Check complete PFTs, autoimmune labs, d.dimer, BNP, and overnight oxymetry on RA. [place] score is 6 so she could have DORA/nocturnal hypoxemia as contributing factor. 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 Will follow testing and if no clear cause, will pursue RHC to fully determine type of PH. Continue low dose daily lasix. Patient agrees to proceed with further testing.     - Spirometry Without Bronchodilator  - CRISTO, Direct, w/Reflex; Future  - Hepatitis C Antibody; Future  - HIV 1/2 Ag/Ab, 4TH Generation,W Rflx Confirm; Future  - Rheumatoid Factor; Future  - CCP Antibodies, IGG/IGA; Future  - D-Dimer, Quantitative; Future  - Brain Natriuretic Peptide; Future  - Pulse oximetry, overnight    2. SOB (shortness of breath)  Described as functional class II symptoms. PFT normal today. 6MWT baseline is 82% predicted distance. Will check CPFT and follow along with other ordered testing as outlined above.     - Spirometry Without Bronchodilator  - CRISTO, Direct, w/Reflex; Future  - Hepatitis C Antibody; Future  - HIV 1/2 Ag/Ab, 4TH Generation,W Rflx Confirm; Future  - Rheumatoid Factor; Future  - CCP Antibodies, IGG/IGA; Future  - D-Dimer, Quantitative; Future  - Brain Natriuretic Peptide; Future  - Pulse oximetry, overnight    No orders of the defined types were placed in this encounter.    No orders of the defined types were placed in this encounter.    Follow-up and Dispositions    Return in about 3 months (around 11/19/2024) for Ashley or Dr. Mendoza,

## 2024-08-20 LAB
ANA SER QL: NEGATIVE
RHEUMATOID FACT SER QL LA: NEGATIVE

## 2024-08-21 DIAGNOSIS — I27.20 PULMONARY HYPERTENSION (HCC): ICD-10-CM

## 2024-08-21 DIAGNOSIS — R06.02 SOB (SHORTNESS OF BREATH): Primary | ICD-10-CM

## 2024-08-21 LAB — CCP IGA+IGG SERPL IA-ACNC: 7 UNITS (ref 0–19)

## 2024-08-22 ENCOUNTER — TELEPHONE (OUTPATIENT)
Dept: PULMONOLOGY | Age: 74
End: 2024-08-22

## 2024-08-22 DIAGNOSIS — R06.02 SOB (SHORTNESS OF BREATH): ICD-10-CM

## 2024-08-22 DIAGNOSIS — I27.20 PULMONARY HYPERTENSION (HCC): Primary | ICD-10-CM

## 2024-09-03 ENCOUNTER — HOSPITAL ENCOUNTER (OUTPATIENT)
Dept: GENERAL RADIOLOGY | Age: 74
Discharge: HOME | End: 2024-09-06
Payer: MEDICARE

## 2024-09-03 DIAGNOSIS — R06.02 SOB (SHORTNESS OF BREATH): ICD-10-CM

## 2024-09-03 DIAGNOSIS — I27.20 PULMONARY HYPERTENSION (HCC): ICD-10-CM

## 2024-09-03 PROCEDURE — 71046 X-RAY EXAM CHEST 2 VIEWS: CPT

## 2024-09-04 ENCOUNTER — HOSPITAL ENCOUNTER (OUTPATIENT)
Dept: NUCLEAR MEDICINE | Age: 74
Discharge: HOME | End: 2024-09-07
Payer: MEDICARE

## 2024-09-04 DIAGNOSIS — R06.02 SOB (SHORTNESS OF BREATH): ICD-10-CM

## 2024-09-04 DIAGNOSIS — I27.20 PULMONARY HYPERTENSION (HCC): ICD-10-CM

## 2024-09-04 PROCEDURE — 78582 LUNG VENTILAT&PERFUS IMAGING: CPT

## 2024-09-04 PROCEDURE — A9539 TC99M PENTETATE: HCPCS | Performed by: STUDENT IN AN ORGANIZED HEALTH CARE EDUCATION/TRAINING PROGRAM

## 2024-09-04 PROCEDURE — 3430000000 HC RX DIAGNOSTIC RADIOPHARMACEUTICAL: Performed by: STUDENT IN AN ORGANIZED HEALTH CARE EDUCATION/TRAINING PROGRAM

## 2024-09-04 PROCEDURE — A9540 TC99M MAA: HCPCS | Performed by: STUDENT IN AN ORGANIZED HEALTH CARE EDUCATION/TRAINING PROGRAM

## 2024-09-04 RX ORDER — KIT FOR THE PREPARATION OF TECHNETIUM TC 99M PENTETATE 20 MG/1
40.8 INJECTION, POWDER, LYOPHILIZED, FOR SOLUTION INTRAVENOUS; RESPIRATORY (INHALATION)
Status: COMPLETED | OUTPATIENT
Start: 2024-09-04 | End: 2024-09-04

## 2024-09-04 RX ADMIN — KIT FOR THE PREPARATION OF TECHNETIUM TC 99M ALBUMIN AGGREGATED 6.1 MILLICURIE: 2.5 INJECTION, POWDER, FOR SOLUTION INTRAVENOUS at 11:16

## 2024-09-04 RX ADMIN — KIT FOR THE PREPARATION OF TECHNETIUM TC 99M PENTETATE 40.8 MILLICURIE: 20 INJECTION, POWDER, LYOPHILIZED, FOR SOLUTION INTRAVENOUS; RESPIRATORY (INHALATION) at 11:07

## 2024-10-04 ENCOUNTER — TELEPHONE (OUTPATIENT)
Dept: PULMONOLOGY | Age: 74
End: 2024-10-04

## 2024-10-04 DIAGNOSIS — G47.34 NOCTURNAL HYPOXEMIA: ICD-10-CM

## 2024-10-04 DIAGNOSIS — I27.20 PULMONARY HYPERTENSION (HCC): Primary | ICD-10-CM

## 2024-10-04 NOTE — TELEPHONE ENCOUNTER
Spoke to patient and let her know DESTINEY on room air shows over 3 hours of desaturations below 90% with her lowest sat being 58%. She definitely needs 3 lpm with sleep. Patient agrees to oxygen and wants to proceed with sleep study. Order placed for oxygen and put in goscripts. Order placed for HST and patient voices understanding.

## 2024-10-04 NOTE — TELEPHONE ENCOUNTER
----- Message from ROBIN Rosario NP sent at 9/24/2024 11:18 AM EDT -----  This is a PH patient. Her DESTINEY on room air shows over 3 hours of desaturations below 90% with her lowest sat being 58%. She definitely needs 3 lpm with sleep. She also likely needs a sleep study if she is willing. Will you call her and ask if she is willing to undergo sleep apnea workup and if not we can continue O2 at home. Please order 3 lpm for sleep and recheck DESTINEY on 3 lpm in a couple of weeks.   Thank you,   ROBIN Palomares NP

## 2024-10-14 ENCOUNTER — HOSPITAL ENCOUNTER (OUTPATIENT)
Dept: MAMMOGRAPHY | Age: 74
Discharge: HOME | End: 2024-10-17
Payer: MEDICARE

## 2024-10-14 VITALS — HEIGHT: 65 IN | WEIGHT: 226 LBS | BODY MASS INDEX: 37.65 KG/M2

## 2024-10-14 DIAGNOSIS — Z12.31 ENCOUNTER FOR SCREENING MAMMOGRAM FOR HIGH-RISK PATIENT: ICD-10-CM

## 2024-10-14 DIAGNOSIS — Z78.0 ASYMPTOMATIC MENOPAUSAL STATE: ICD-10-CM

## 2024-10-14 PROCEDURE — 77080 DXA BONE DENSITY AXIAL: CPT

## 2024-10-14 PROCEDURE — 77063 BREAST TOMOSYNTHESIS BI: CPT

## 2024-10-25 ENCOUNTER — HOSPITAL ENCOUNTER (OUTPATIENT)
Dept: SLEEP CENTER | Age: 74
Discharge: HOME | End: 2024-10-28
Payer: MEDICARE

## 2024-10-25 PROCEDURE — 95806 SLEEP STUDY UNATT&RESP EFFT: CPT

## 2024-11-13 DIAGNOSIS — G47.33 OSA (OBSTRUCTIVE SLEEP APNEA): Primary | ICD-10-CM

## 2024-11-20 ENCOUNTER — NURSE ONLY (OUTPATIENT)
Dept: PULMONOLOGY | Age: 74
End: 2024-11-20

## 2024-11-20 ENCOUNTER — TELEPHONE (OUTPATIENT)
Dept: PULMONOLOGY | Age: 74
End: 2024-11-20

## 2024-11-20 DIAGNOSIS — I27.20 PULMONARY HYPERTENSION (HCC): Primary | ICD-10-CM

## 2024-11-20 LAB
FEV 1 , POC: 1.91 L
FEV1 % PRED, POC: 88 %
FEV1/FVC, POC: NORMAL
FVC % PRED, POC: 79 %
FVC, POC: NORMAL

## 2024-11-20 ASSESSMENT — PULMONARY FUNCTION TESTS
FVC_PERCENT_PREDICTED_POC: 79
FEV1_PERCENT_PREDICTED_POC: 88

## 2024-11-20 NOTE — TELEPHONE ENCOUNTER
Patient was here for a cpft. Before she left she asked for me to let Ashley Gavin know that she has not had her sleep study done yet. She left 2 messages with the sleep lab and hasn't received a call back. I walked her to the sleep lab and gave them her information. She was told someone will call her today to schedule her.

## 2024-12-02 ENCOUNTER — HOSPITAL ENCOUNTER (OUTPATIENT)
Dept: SLEEP MEDICINE | Age: 74
Discharge: HOME | End: 2024-12-05
Payer: MEDICARE

## 2024-12-02 PROCEDURE — 95811 POLYSOM 6/>YRS CPAP 4/> PARM: CPT

## 2024-12-06 ENCOUNTER — OFFICE VISIT (OUTPATIENT)
Dept: PULMONOLOGY | Age: 74
End: 2024-12-06
Payer: MEDICARE

## 2024-12-06 VITALS
BODY MASS INDEX: 37.15 KG/M2 | WEIGHT: 223 LBS | SYSTOLIC BLOOD PRESSURE: 122 MMHG | HEART RATE: 81 BPM | RESPIRATION RATE: 18 BRPM | DIASTOLIC BLOOD PRESSURE: 70 MMHG | TEMPERATURE: 97.4 F | OXYGEN SATURATION: 95 % | HEIGHT: 65 IN

## 2024-12-06 DIAGNOSIS — K21.9 GASTROESOPHAGEAL REFLUX DISEASE WITHOUT ESOPHAGITIS: ICD-10-CM

## 2024-12-06 DIAGNOSIS — G47.33 OSA (OBSTRUCTIVE SLEEP APNEA): ICD-10-CM

## 2024-12-06 DIAGNOSIS — I27.20 PULMONARY HYPERTENSION (HCC): Primary | ICD-10-CM

## 2024-12-06 DIAGNOSIS — G47.33 OBSTRUCTIVE SLEEP APNEA SYNDROME: Chronic | ICD-10-CM

## 2024-12-06 DIAGNOSIS — G47.34 NOCTURNAL HYPOXEMIA: ICD-10-CM

## 2024-12-06 DIAGNOSIS — R06.02 SOB (SHORTNESS OF BREATH): ICD-10-CM

## 2024-12-06 PROCEDURE — 1123F ACP DISCUSS/DSCN MKR DOCD: CPT | Performed by: STUDENT IN AN ORGANIZED HEALTH CARE EDUCATION/TRAINING PROGRAM

## 2024-12-06 PROCEDURE — 99215 OFFICE O/P EST HI 40 MIN: CPT | Performed by: STUDENT IN AN ORGANIZED HEALTH CARE EDUCATION/TRAINING PROGRAM

## 2024-12-06 PROCEDURE — 1159F MED LIST DOCD IN RCRD: CPT | Performed by: STUDENT IN AN ORGANIZED HEALTH CARE EDUCATION/TRAINING PROGRAM

## 2024-12-06 PROCEDURE — 3074F SYST BP LT 130 MM HG: CPT | Performed by: STUDENT IN AN ORGANIZED HEALTH CARE EDUCATION/TRAINING PROGRAM

## 2024-12-06 PROCEDURE — 3078F DIAST BP <80 MM HG: CPT | Performed by: STUDENT IN AN ORGANIZED HEALTH CARE EDUCATION/TRAINING PROGRAM

## 2024-12-06 PROCEDURE — 1126F AMNT PAIN NOTED NONE PRSNT: CPT | Performed by: STUDENT IN AN ORGANIZED HEALTH CARE EDUCATION/TRAINING PROGRAM

## 2024-12-06 RX ORDER — PANTOPRAZOLE SODIUM 40 MG/1
40 TABLET, DELAYED RELEASE ORAL DAILY
Qty: 90 TABLET | Refills: 3 | Status: SHIPPED | OUTPATIENT
Start: 2024-12-06

## 2024-12-06 RX ORDER — FAMOTIDINE 20 MG/1
20 TABLET, FILM COATED ORAL NIGHTLY
Qty: 60 TABLET | Refills: 5 | Status: SHIPPED | OUTPATIENT
Start: 2024-12-06

## 2024-12-06 RX ORDER — IPRATROPIUM BROMIDE 21 UG/1
2 SPRAY, METERED NASAL 2 TIMES DAILY
Qty: 1 EACH | Refills: 11 | Status: SHIPPED | OUTPATIENT
Start: 2024-12-06

## 2024-12-06 NOTE — PROGRESS NOTES
99m DTPA aerosol were administered  by inhalation, and ventilation images were obtained in eight obliquities.   Corresponding perfusion images were obtained after subsequent injection of 6.1  mCi of technetium 99m macroaggregated albumin IV.     COMPARISON: 3 September 2024        FINDINGS: No perfusion defects identified to suggest the presence of either  acute nor chronic pulmonary embolic disease. No significant ventilation defects  identified. Mild clumping of radiotracer centrally on the ventilation images  nonspecific and indicative of turbulent airflow.     IMPRESSION:  No specific findings to suggest the presence of chronic pulmonary  thromboembolic disease to account for the patient's history of pulmonary  arterial hypertension.      LABS:   Lab Results   Component Value Date/Time    WBC 4.9 09/29/2023 07:00 PM    HGB 11.3 09/29/2023 07:00 PM    HCT 34.8 09/29/2023 07:00 PM    PLT 72 09/29/2023 07:00 PM    TSH 0.297 06/18/2024 01:31 PM    CRISTO Negative 08/19/2024 10:39 AM    RF Negative 08/19/2024 10:39 AM     REFERENCE INFO:                                                                                                                            Past Medical History:   Diagnosis Date    Arthritis     osteo    Breast cancer (HCC) 2001    had chemo and radiation for breast cancer in left breast    Breast cancer (HCC) dx 9/17/20    right breast-- lumpectomy/ plans for chemo and radiation--- followed by dr velazquez    Chronic kidney disease     stage 3 kidney disease--- followed by dr. mary BUNDY (gastroesophageal reflux disease)      prn meds    Hypercholesterolemia     Hypertension     controlled with med    Nausea & vomiting     post op n/v     Thyroid disease     hypo     Allergies   Allergen Reactions    Adhesive Tape      Can use paper tape per patient    Nsaids Other (See Comments)     Other Reaction(s): Other- (not listed) - Allergy    Can not have because of CKD     Current Outpatient Medications

## 2024-12-09 ENCOUNTER — TELEPHONE (OUTPATIENT)
Dept: SLEEP MEDICINE | Age: 74
End: 2024-12-09

## 2024-12-09 DIAGNOSIS — G47.33 OSA (OBSTRUCTIVE SLEEP APNEA): Primary | ICD-10-CM

## 2024-12-09 NOTE — TELEPHONE ENCOUNTER
Patient had recent sleep study showing moderate sleep apnea. Cpap therapy is recommended per sleep interp.  Patient has agreed to start CPAP therapy. Order sent to [facility].     Stacey Pantoja MA

## 2024-12-18 ENCOUNTER — OFFICE VISIT (OUTPATIENT)
Age: 74
End: 2024-12-18
Payer: MEDICARE

## 2024-12-18 VITALS
SYSTOLIC BLOOD PRESSURE: 130 MMHG | HEIGHT: 65 IN | HEART RATE: 89 BPM | WEIGHT: 228 LBS | BODY MASS INDEX: 37.99 KG/M2 | DIASTOLIC BLOOD PRESSURE: 60 MMHG

## 2024-12-18 DIAGNOSIS — I10 PRIMARY HYPERTENSION: ICD-10-CM

## 2024-12-18 DIAGNOSIS — G47.33 OBSTRUCTIVE SLEEP APNEA SYNDROME: Chronic | ICD-10-CM

## 2024-12-18 DIAGNOSIS — I27.20 PULMONARY HYPERTENSION (HCC): ICD-10-CM

## 2024-12-18 DIAGNOSIS — I50.32 CHRONIC DIASTOLIC CONGESTIVE HEART FAILURE (HCC): Primary | ICD-10-CM

## 2024-12-18 DIAGNOSIS — I44.7 LBBB (LEFT BUNDLE BRANCH BLOCK): ICD-10-CM

## 2024-12-18 PROCEDURE — 3075F SYST BP GE 130 - 139MM HG: CPT | Performed by: INTERNAL MEDICINE

## 2024-12-18 PROCEDURE — 1123F ACP DISCUSS/DSCN MKR DOCD: CPT | Performed by: INTERNAL MEDICINE

## 2024-12-18 PROCEDURE — 93000 ELECTROCARDIOGRAM COMPLETE: CPT | Performed by: INTERNAL MEDICINE

## 2024-12-18 PROCEDURE — 3078F DIAST BP <80 MM HG: CPT | Performed by: INTERNAL MEDICINE

## 2024-12-18 PROCEDURE — 1126F AMNT PAIN NOTED NONE PRSNT: CPT | Performed by: INTERNAL MEDICINE

## 2024-12-18 PROCEDURE — 99214 OFFICE O/P EST MOD 30 MIN: CPT | Performed by: INTERNAL MEDICINE

## 2024-12-18 RX ORDER — DIPHENOXYLATE HYDROCHLORIDE AND ATROPINE SULFATE 2.5; .025 MG/1; MG/1
1 TABLET ORAL 4 TIMES DAILY PRN
COMMUNITY

## 2024-12-18 NOTE — PROGRESS NOTES
[address] [facility], [place] [address]  PHONE: [phone]     24    NAME:  Rosey Pavon  : 1950  MRN: [id number]       SUBJECTIVE:   Rosey Pavon is a 74 y.o. female seen for a follow up visit regarding the following:     Chief Complaint   Patient presents with    Congestive Heart Failure    Follow-up       HPI: Breast CA in , CA free now.   Echo 10/2023: normal EF, mod PHTN  NST 10/2023 low risk of cardiac events.       Getting on CPAP, on oxygen.  Sleeping better now, this has helped her.    Some GRAVES, some some SOB.   Never smoked.  Former cotton .    Some CP comes and goes.   Patient denies recent history of orthopnea, PND, excessive dizziness and/or syncope.    On daily lasix now.   Off HCTZ.        Past Medical History, Past Surgical History, Family history, Social History, and Medications were all reviewed with the patient today and updated as necessary.     Current Outpatient Medications   Medication Sig Dispense Refill    diphenoxylate-atropine (LOMOTIL) 2.5-0.025 MG per tablet Take 1 tablet by mouth 4 times daily as needed for Diarrhea. Max Daily Amount: 4 tablets      pantoprazole (PROTONIX) 40 MG tablet Take 1 tablet by mouth daily 90 tablet 3    famotidine (PEPCID) 20 MG tablet Take 1 tablet by mouth nightly 60 tablet 5    ipratropium (ATROVENT) 0.03 % nasal spray 2 sprays by Each Nostril route 2 times daily 1 each 11    Cholecalciferol (VITAMIN D3) 250 MCG (88493 UT) CAPS Take by mouth 1 cap 5 days per week      furosemide (LASIX) 20 MG tablet Take 1 tablet by mouth daily 90 tablet 1    acetaminophen (TYLENOL) 325 MG tablet 2 tablets as needed Orally every 6 hrs      benazepril (LOTENSIN) 20 MG tablet 1 tablet for blood pressure Orally Once a day for 90 days      diclofenac sodium (VOLTAREN) 1 % GEL       SYNTHROID 112 MCG tablet       fexofenadine (ALLEGRA) 180 MG tablet Take 1 tablet by mouth daily      Magnesium 500 MG TABS Take by

## 2025-03-06 NOTE — PROGRESS NOTES
[facility] sleep disorder center  [address]  [place] [address]  [phone]    Patient Name:  Roesy Pavon  YOB: 1950      Office Visit 3/7/2025    CHIEF COMPLAINT:    Chief Complaint   Patient presents with    New Patient    Sleep Study    Sleep Apnea    CPAP/BiPAP       HISTORY OF PRESENT ILLNESS:      The patient present for management of obstructive sleep apnea.    The patient underwent a recent diagnostic HST which was notable for a respiratory disturbance index of 27.6/hour.  Oxygen desaturations are low as 68% were noted.  Significant cardiac arrhythmias were not evident.  The patient was noted to have frequent limb movements with a limb movement arousal index being about 1.0/hour.  A subsequent CPAP titration study was conducted.  CPAP levels as high as 14 cm were performed.  CPAP was effective in eliminating disordered breathing.  CPAP was tolerated quite well by the patient.  The patient reports feeling more rested the day following.  The patient apparently has been on oxygen at 3 L/min to use with sleep prior to doing her CPAP titration.     There is no history of cataplexy, hypnagogic hallucinations, or sleep paralysis.  In addition, there is no history of frequent leg movements, kicking at night, or an inability to keep the legs still.     The patient was started on CPAP at 8-15 cm with EPR 2.  Her download indicated excellent compliance at 100%.  The average daily use is 7 hours and 5 minutes and her pressure requirement is between 10.5 and 14.6 cm.  The 95th percentile pressure for her is 13.7 cm.  Her AHI decreased to 2.5 per/hour.  She does have significant leak with the median leak is 11.1 L/min and 95th percentile leak is 64.9 L/min.  She is using the medium F30 I mask and I suggested she needs a different size.  She also asked about ENT evaluation due to chronic sinus problem.  She was wondering if she needs to keep the oxygen at home or not.  I

## 2025-03-07 ENCOUNTER — OFFICE VISIT (OUTPATIENT)
Dept: SLEEP MEDICINE | Age: 75
End: 2025-03-07
Payer: COMMERCIAL

## 2025-03-07 VITALS
WEIGHT: 232 LBS | HEART RATE: 92 BPM | HEIGHT: 65 IN | RESPIRATION RATE: 14 BRPM | BODY MASS INDEX: 38.65 KG/M2 | SYSTOLIC BLOOD PRESSURE: 130 MMHG | OXYGEN SATURATION: 100 % | DIASTOLIC BLOOD PRESSURE: 64 MMHG

## 2025-03-07 DIAGNOSIS — G47.34 NOCTURNAL HYPOXEMIA: Chronic | ICD-10-CM

## 2025-03-07 DIAGNOSIS — G47.33 OBSTRUCTIVE SLEEP APNEA ON CPAP: Primary | ICD-10-CM

## 2025-03-07 DIAGNOSIS — R40.0 DAYTIME SLEEPINESS: ICD-10-CM

## 2025-03-07 DIAGNOSIS — I27.20 PULMONARY HYPERTENSION (HCC): ICD-10-CM

## 2025-03-07 PROCEDURE — 1123F ACP DISCUSS/DSCN MKR DOCD: CPT | Performed by: INTERNAL MEDICINE

## 2025-03-07 PROCEDURE — 99215 OFFICE O/P EST HI 40 MIN: CPT | Performed by: INTERNAL MEDICINE

## 2025-03-07 PROCEDURE — G2211 COMPLEX E/M VISIT ADD ON: HCPCS | Performed by: INTERNAL MEDICINE

## 2025-03-07 PROCEDURE — 3075F SYST BP GE 130 - 139MM HG: CPT | Performed by: INTERNAL MEDICINE

## 2025-03-07 PROCEDURE — 3078F DIAST BP <80 MM HG: CPT | Performed by: INTERNAL MEDICINE

## 2025-03-31 ENCOUNTER — OFFICE VISIT (OUTPATIENT)
Dept: ENT CLINIC | Age: 75
End: 2025-03-31
Payer: COMMERCIAL

## 2025-03-31 VITALS
DIASTOLIC BLOOD PRESSURE: 70 MMHG | SYSTOLIC BLOOD PRESSURE: 138 MMHG | WEIGHT: 230 LBS | BODY MASS INDEX: 38.32 KG/M2 | HEIGHT: 65 IN

## 2025-03-31 DIAGNOSIS — K21.9 CHRONIC GERD: ICD-10-CM

## 2025-03-31 DIAGNOSIS — R09.82 PND (POST-NASAL DRIP): Primary | ICD-10-CM

## 2025-03-31 DIAGNOSIS — T17.310A CHOKING DUE TO PHLEGM IN LARYNX, INITIAL ENCOUNTER: ICD-10-CM

## 2025-03-31 DIAGNOSIS — G47.33 OSA ON CPAP: ICD-10-CM

## 2025-03-31 PROCEDURE — 1123F ACP DISCUSS/DSCN MKR DOCD: CPT | Performed by: STUDENT IN AN ORGANIZED HEALTH CARE EDUCATION/TRAINING PROGRAM

## 2025-03-31 PROCEDURE — 3075F SYST BP GE 130 - 139MM HG: CPT | Performed by: STUDENT IN AN ORGANIZED HEALTH CARE EDUCATION/TRAINING PROGRAM

## 2025-03-31 PROCEDURE — 31231 NASAL ENDOSCOPY DX: CPT | Performed by: STUDENT IN AN ORGANIZED HEALTH CARE EDUCATION/TRAINING PROGRAM

## 2025-03-31 PROCEDURE — 99203 OFFICE O/P NEW LOW 30 MIN: CPT | Performed by: STUDENT IN AN ORGANIZED HEALTH CARE EDUCATION/TRAINING PROGRAM

## 2025-03-31 PROCEDURE — 3078F DIAST BP <80 MM HG: CPT | Performed by: STUDENT IN AN ORGANIZED HEALTH CARE EDUCATION/TRAINING PROGRAM

## 2025-03-31 NOTE — PROGRESS NOTES
pantoprazole (PROTONIX) 40 MG tablet Take 1 tablet by mouth daily    famotidine (PEPCID) 20 MG tablet Take 1 tablet by mouth nightly    ipratropium (ATROVENT) 0.03 % nasal spray 2 sprays by Each Nostril route 2 times daily    Cholecalciferol (VITAMIN D3) 250 MCG (45399 UT) CAPS Take by mouth 1 cap 5 days per week    acetaminophen (TYLENOL) 325 MG tablet 2 tablets as needed Orally every 6 hrs    benazepril (LOTENSIN) 20 MG tablet 1 tablet for blood pressure Orally Once a day for 90 days    diclofenac sodium (VOLTAREN) 1 % GEL     SYNTHROID 112 MCG tablet     fexofenadine (ALLEGRA) 180 MG tablet Take 1 tablet by mouth daily    Magnesium 500 MG TABS Take by mouth    Potassium Gluconate (QC POTASSIUM PO) Take by mouth    DULoxetine (CYMBALTA) 30 MG extended release capsule Take 1 capsule by mouth daily    traMADol (ULTRAM) 50 MG tablet     ondansetron (ZOFRAN) 8 MG tablet Take 0.5 tablets by mouth every 8 hours as needed for Nausea    rosuvastatin (CRESTOR) 10 MG tablet Take 1 tablet by mouth every evening    furosemide (LASIX) 20 MG tablet Take 1 tablet by mouth daily     No current facility-administered medications for this visit.       Past Medical History:   Diagnosis Date    Arthritis     osteo    Breast cancer (HCC) 2001    had chemo and radiation for breast cancer in left breast    Breast cancer (HCC) dx 9/17/20    right breast-- lumpectomy/ plans for chemo and radiation--- followed by dr velazquez    Chronic kidney disease     stage 3 kidney disease--- followed by dr. mary BUNDY (gastroesophageal reflux disease)      prn meds    Hypercholesterolemia     Hypertension     controlled with med    Nausea & vomiting     post op n/v     Sleep apnea     Thyroid disease     hypo       Past Surgical History:   Procedure Laterality Date    ANUS SURGERY N/A 03/21/2023    FISTULA PLUG/ FISTULECTOMY [place] Newsome [place] [facility] [place]    BREAST BIOPSY Right 09/17/2020    U/S    BREAST LUMPECTOMY Left 2001

## 2025-04-09 ASSESSMENT — ENCOUNTER SYMPTOMS
COUGH: 0
APNEA: 0
CONSTIPATION: 0
WHEEZING: 0
NAUSEA: 0
EYE ITCHING: 0
SINUS PRESSURE: 0
EYE PAIN: 0
FACIAL SWELLING: 0
CHOKING: 0
DIARRHEA: 0
SHORTNESS OF BREATH: 0
STRIDOR: 0
SINUS PAIN: 0
EYE DISCHARGE: 0

## 2025-05-12 ENCOUNTER — CLINICAL SUPPORT (OUTPATIENT)
Dept: PULMONOLOGY | Age: 75
End: 2025-05-12

## 2025-05-12 ENCOUNTER — TELEPHONE (OUTPATIENT)
Age: 75
End: 2025-05-12

## 2025-05-12 ENCOUNTER — OFFICE VISIT (OUTPATIENT)
Dept: PULMONOLOGY | Age: 75
End: 2025-05-12
Payer: MEDICARE

## 2025-05-12 VITALS
RESPIRATION RATE: 14 BRPM | WEIGHT: 233.3 LBS | HEIGHT: 65 IN | BODY MASS INDEX: 38.87 KG/M2 | SYSTOLIC BLOOD PRESSURE: 133 MMHG | TEMPERATURE: 97.8 F | HEART RATE: 97 BPM | OXYGEN SATURATION: 96 % | DIASTOLIC BLOOD PRESSURE: 71 MMHG

## 2025-05-12 VITALS
WEIGHT: 233.3 LBS | RESPIRATION RATE: 14 BRPM | TEMPERATURE: 97.8 F | DIASTOLIC BLOOD PRESSURE: 71 MMHG | HEART RATE: 97 BPM | HEIGHT: 65 IN | SYSTOLIC BLOOD PRESSURE: 133 MMHG | BODY MASS INDEX: 38.87 KG/M2

## 2025-05-12 DIAGNOSIS — R06.09 DYSPNEA ON EXERTION: Primary | ICD-10-CM

## 2025-05-12 DIAGNOSIS — G47.33 OSA (OBSTRUCTIVE SLEEP APNEA): ICD-10-CM

## 2025-05-12 DIAGNOSIS — R60.0 BILATERAL LOWER EXTREMITY EDEMA: ICD-10-CM

## 2025-05-12 DIAGNOSIS — I27.20 PULMONARY HYPERTENSION (HCC): ICD-10-CM

## 2025-05-12 DIAGNOSIS — R06.09 DYSPNEA ON EXERTION: ICD-10-CM

## 2025-05-12 DIAGNOSIS — I50.32 CHRONIC DIASTOLIC CONGESTIVE HEART FAILURE (HCC): Primary | ICD-10-CM

## 2025-05-12 LAB
ANION GAP SERPL CALC-SCNC: 12 MMOL/L (ref 7–16)
BUN SERPL-MCNC: 9 MG/DL (ref 8–23)
CALCIUM SERPL-MCNC: 9.6 MG/DL (ref 8.8–10.2)
CHLORIDE SERPL-SCNC: 105 MMOL/L (ref 98–107)
CO2 SERPL-SCNC: 24 MMOL/L (ref 20–29)
CREAT SERPL-MCNC: 0.95 MG/DL (ref 0.6–1.1)
GLUCOSE SERPL-MCNC: 70 MG/DL (ref 70–99)
NT PRO BNP: 614 PG/ML (ref 0–450)
POTASSIUM SERPL-SCNC: 4 MMOL/L (ref 3.5–5.1)
SODIUM SERPL-SCNC: 140 MMOL/L (ref 136–145)

## 2025-05-12 PROCEDURE — 1123F ACP DISCUSS/DSCN MKR DOCD: CPT | Performed by: STUDENT IN AN ORGANIZED HEALTH CARE EDUCATION/TRAINING PROGRAM

## 2025-05-12 PROCEDURE — 3078F DIAST BP <80 MM HG: CPT | Performed by: STUDENT IN AN ORGANIZED HEALTH CARE EDUCATION/TRAINING PROGRAM

## 2025-05-12 PROCEDURE — 3075F SYST BP GE 130 - 139MM HG: CPT | Performed by: STUDENT IN AN ORGANIZED HEALTH CARE EDUCATION/TRAINING PROGRAM

## 2025-05-12 PROCEDURE — 99215 OFFICE O/P EST HI 40 MIN: CPT | Performed by: STUDENT IN AN ORGANIZED HEALTH CARE EDUCATION/TRAINING PROGRAM

## 2025-05-12 NOTE — PROGRESS NOTES
[facility]  [facility]  [address], [address]   [place] [id number]  T: [phone]  F: [phone]       6 MINUTE WALK TEST     Patient's Name Rosey Pavon   Age 75 y.o.    Gender female   Height 5'5   Weight 233.3 lbs   Ordering Provider  KULWINDER CHEN NP          Time Dyspnea  (Veronica Scale)  Fatigue  (Veronica Scale)  Blood Pressure Heart Rate Oxygen SAT RA or   w / O2?    BASELINE 12:50PM  2 2 133 / 71 93 97 RA                                                                              POST TEST  12:56PM 3 3 164 / 71 109 94 RA     Does the patient use any walking aids? YES   - CANE .   Medications taken before test are up to date:  Yes  Supplemental oxygen during the test: NO    Stopped or paused before 6 minutes? No  Other symptoms at end of exercise: Leg Pain    Number of complete laps: 3 x 60 meters = 180 meters + final partial lap: 0 meters = 180 meters    Total distance walked in 6 minutes:  180  Meters  Predicted distance: 378 meters    Percent of predicted distance: 48 %                Tech comments:patient demonstrated great effort.     Test Performed by: CELE LUNDBERG MA

## 2025-05-12 NOTE — PROGRESS NOTES
Patient Name:  Rosey Pavon                               YOB: 1950  MRN: [id number]                                                Office Visit 5/12/2025    ASSESSMENT AND PLAN:  (Medical Decision Making)    1. Dyspnea on exertion  Reporting functional class II symptoms but has a decreased 6MWT distance today. Mild lower extremities edema noted today as well. Need to check proBNP and BMP. May need to adjust lasix dosing.   Will also collaborate with Dr. Clark with [facility]     - Basic Metabolic Panel; Future  - Brain Natriuretic Peptide; Future    2. Bilateral lower extremity edema  +1 pitting edema today but has had more severe episodes in the recent past. Instructed her to continue daily weights and take an extra 20mg lasix x 3 days if she has severe swelling or if she gains 2+ lbs in 24 hours. Check BNP today.     - Basic Metabolic Panel; Future  - Brain Natriuretic Peptide; Future    3. Pulmonary hypertension (HCC)  Suspicious of primarily group 3 with untreated DORA and nocturnal hypoxemia. Now has CPAP and is compliant. Will need updated ECHO once on CPAP for at least 3 months. Will ask Dr. Clark if this can be done close to her follow up with him in June. Check BNP and BMP today. May need to adjust lasix.     4. DORA (obstructive sleep apnea)  Compliant with CPAP at 8/15. DESTINEY ordered by sleep to check oxygenation on CPAP alone. This is supposed to be delivered tomorrow per patient report. Will follow results. Can discontinue supplemental oxygen that she has at home if this is not necessary.      Follow-up and Dispositions    Return in about 3 months (around 8/12/2025) for Ashley or Dr. Mendoza, pulmonary hypertension.       ROBIN Palomares - NP    Total time for encounter on day of encounter was 42 minutes.  This time includes chart prep, review of tests/procedures, review of other provider's notes, documentation and counseling patient regarding disease process and

## 2025-05-12 NOTE — PATIENT INSTRUCTIONS
Your pulmonary hypertension team here at [facility] are Aleja Mendoza MD, Ashley Gavin DNP, AGACNP, and Vero TORO CMA. We will work together to care for you. You can reach us at [phone] or through MyChart messaging.     -- if you have an episode of increased swelling or weight increased 2 lbs or more overnight, take an extra lasix tablet to equal 40mg a day for 3 days and let us and cardiology know.   -- Take miralax over the counter daily for constipation.   -- continue to use your CPAP nightly and complete Overnight oxygen study ordered by Dr. Dyer  -- we will check repeat ECHO this summer   -- we will call you with the lab work results if there is anything of concern

## 2025-05-12 NOTE — TELEPHONE ENCOUNTER
----- Message from Dr. Rajinder Clark DO sent at 5/12/2025  4:08 PM EDT -----  See below, needs echo before seeing me in June.   Thanks  ----- Message -----  From: Ashley Gavin APRN - NP  Sent: 5/12/2025   2:40 PM EDT  To: DO Dr. Eduardo Abraham,   I saw our mutual patient today, Mrs. Pavon and she has been compliant with CPAP therapy now for a couple of months. Will you check an updated ECHO at or around your next follow up with her which I think is mid June? I can place the order but she said she thought you were planning on doing an ECHO at that time. She is doing fairly well but has some lower extremity edema. I am checking BMP and BNP today.   Thank you!   ROBIN Palomares NP

## 2025-05-13 ENCOUNTER — RESULTS FOLLOW-UP (OUTPATIENT)
Dept: PULMONOLOGY | Age: 75
End: 2025-05-13

## 2025-05-21 NOTE — TELEPHONE ENCOUNTER
----- Message from ROBIN Rosario NP sent at 5/13/2025  1:02 PM EDT -----  Will you please call Ms. Pavon and instruct her to take 2 lasix tablets per day for 3 total days. I am increasing her fluid pill for a few days based on her lab results and she has a little fluid overload. Her kidney function is normal.   Thank you.   ROBIN Palomares NP

## 2025-05-21 NOTE — TELEPHONE ENCOUNTER
Message from Ms. Ashley Gavin rely to the patient last 05/13/2025. \"Will you please call Ms. Pavon and instruct her to take 2 lasix tablets per day for 3 total days. I am increasing her fluid pill for a few days based on her lab results and she has a little fluid overload. Her kidney function is normal.\" Patient voices understanding.

## 2025-06-01 ENCOUNTER — HOSPITAL ENCOUNTER (OUTPATIENT)
Age: 75
Setting detail: OBSERVATION
Discharge: HOME | End: 2025-06-02
Attending: INTERNAL MEDICINE | Admitting: INTERNAL MEDICINE
Payer: MEDICARE

## 2025-06-01 ENCOUNTER — APPOINTMENT (OUTPATIENT)
Dept: CT IMAGING | Age: 75
End: 2025-06-01
Attending: INTERNAL MEDICINE
Payer: MEDICARE

## 2025-06-01 ENCOUNTER — APPOINTMENT (OUTPATIENT)
Dept: NON INVASIVE DIAGNOSTICS | Age: 75
End: 2025-06-01
Attending: INTERNAL MEDICINE
Payer: MEDICARE

## 2025-06-01 DIAGNOSIS — R07.9 CHEST PAIN: ICD-10-CM

## 2025-06-01 DIAGNOSIS — R07.9 CHEST PAIN, UNSPECIFIED TYPE: Primary | ICD-10-CM

## 2025-06-01 LAB
ALBUMIN SERPL-MCNC: 2.9 G/DL (ref 3.2–4.6)
ALBUMIN/GLOB SERPL: 0.7 (ref 1–1.9)
ALP SERPL-CCNC: 96 U/L (ref 35–104)
ALT SERPL-CCNC: 9 U/L (ref 8–45)
ANION GAP SERPL CALC-SCNC: 10 MMOL/L (ref 7–16)
APTT PPP: 21.1 SEC (ref 23.3–37.4)
AST SERPL-CCNC: 24 U/L (ref 15–37)
BILIRUB SERPL-MCNC: 0.7 MG/DL (ref 0–1.2)
BUN SERPL-MCNC: 8 MG/DL (ref 8–23)
CALCIUM SERPL-MCNC: 9.5 MG/DL (ref 8.8–10.2)
CHLORIDE SERPL-SCNC: 104 MMOL/L (ref 98–107)
CO2 SERPL-SCNC: 24 MMOL/L (ref 20–29)
CREAT SERPL-MCNC: 1.08 MG/DL (ref 0.6–1.1)
D DIMER PPP FEU-MCNC: 0.56 UG/ML(FEU)
ECHO BSA: 2.18 M2
ERYTHROCYTE [DISTWIDTH] IN BLOOD BY AUTOMATED COUNT: 13.9 % (ref 11.9–14.6)
GLOBULIN SER CALC-MCNC: 4 G/DL (ref 2.3–3.5)
GLUCOSE SERPL-MCNC: 98 MG/DL (ref 70–99)
HCT VFR BLD AUTO: 36.4 % (ref 35.8–46.3)
HGB BLD-MCNC: 12.1 G/DL (ref 11.7–15.4)
INR PPP: 1.2
MAGNESIUM SERPL-MCNC: 2.1 MG/DL (ref 1.8–2.4)
MCH RBC QN AUTO: 30.2 PG (ref 26.1–32.9)
MCHC RBC AUTO-ENTMCNC: 33.2 G/DL (ref 31.4–35)
MCV RBC AUTO: 90.8 FL (ref 82–102)
NRBC # BLD: 0 K/UL (ref 0–0.2)
PLATELET # BLD AUTO: 99 K/UL (ref 150–450)
PMV BLD AUTO: 11.8 FL (ref 9.4–12.3)
POTASSIUM SERPL-SCNC: 3.9 MMOL/L (ref 3.5–5.1)
PROT SERPL-MCNC: 6.9 G/DL (ref 6.3–8.2)
PROTHROMBIN TIME: 14.7 SEC (ref 11.3–14.9)
RBC # BLD AUTO: 4.01 M/UL (ref 4.05–5.2)
SODIUM SERPL-SCNC: 138 MMOL/L (ref 136–145)
TROPONIN T SERPL HS-MCNC: 49.6 NG/L (ref 0–14)
TROPONIN T SERPL HS-MCNC: 52.5 NG/L (ref 0–14)
UFH PPP CHRO-ACNC: <0.1 IU/ML (ref 0.3–0.7)
WBC # BLD AUTO: 5.1 K/UL (ref 4.3–11.1)

## 2025-06-01 PROCEDURE — 6370000000 HC RX 637 (ALT 250 FOR IP): Performed by: INTERNAL MEDICINE

## 2025-06-01 PROCEDURE — 36415 COLL VENOUS BLD VENIPUNCTURE: CPT

## 2025-06-01 PROCEDURE — C1887 CATHETER, GUIDING: HCPCS | Performed by: INTERNAL MEDICINE

## 2025-06-01 PROCEDURE — 93454 CORONARY ARTERY ANGIO S&I: CPT | Performed by: INTERNAL MEDICINE

## 2025-06-01 PROCEDURE — 85027 COMPLETE CBC AUTOMATED: CPT

## 2025-06-01 PROCEDURE — 96365 THER/PROPH/DIAG IV INF INIT: CPT

## 2025-06-01 PROCEDURE — 80053 COMPREHEN METABOLIC PANEL: CPT

## 2025-06-01 PROCEDURE — 71260 CT THORAX DX C+: CPT

## 2025-06-01 PROCEDURE — 85730 THROMBOPLASTIN TIME PARTIAL: CPT

## 2025-06-01 PROCEDURE — 6360000002 HC RX W HCPCS: Performed by: INTERNAL MEDICINE

## 2025-06-01 PROCEDURE — C1769 GUIDE WIRE: HCPCS | Performed by: INTERNAL MEDICINE

## 2025-06-01 PROCEDURE — 85610 PROTHROMBIN TIME: CPT

## 2025-06-01 PROCEDURE — 99152 MOD SED SAME PHYS/QHP 5/>YRS: CPT | Performed by: INTERNAL MEDICINE

## 2025-06-01 PROCEDURE — G0379 DIRECT REFER HOSPITAL OBSERV: HCPCS

## 2025-06-01 PROCEDURE — C1894 INTRO/SHEATH, NON-LASER: HCPCS | Performed by: INTERNAL MEDICINE

## 2025-06-01 PROCEDURE — 84484 ASSAY OF TROPONIN QUANT: CPT

## 2025-06-01 PROCEDURE — 6360000004 HC RX CONTRAST MEDICATION: Performed by: INTERNAL MEDICINE

## 2025-06-01 PROCEDURE — G0378 HOSPITAL OBSERVATION PER HR: HCPCS

## 2025-06-01 PROCEDURE — 2580000003 HC RX 258: Performed by: CASE MANAGER/CARE COORDINATOR

## 2025-06-01 PROCEDURE — 99153 MOD SED SAME PHYS/QHP EA: CPT | Performed by: INTERNAL MEDICINE

## 2025-06-01 PROCEDURE — 85379 FIBRIN DEGRADATION QUANT: CPT

## 2025-06-01 PROCEDURE — 2500000003 HC RX 250 WO HCPCS: Performed by: CASE MANAGER/CARE COORDINATOR

## 2025-06-01 PROCEDURE — 93458 L HRT ARTERY/VENTRICLE ANGIO: CPT | Performed by: INTERNAL MEDICINE

## 2025-06-01 PROCEDURE — 6360000002 HC RX W HCPCS: Performed by: PHYSICIAN ASSISTANT

## 2025-06-01 PROCEDURE — 83735 ASSAY OF MAGNESIUM: CPT

## 2025-06-01 PROCEDURE — 85520 HEPARIN ASSAY: CPT

## 2025-06-01 PROCEDURE — 2500000003 HC RX 250 WO HCPCS: Performed by: INTERNAL MEDICINE

## 2025-06-01 PROCEDURE — 2709999900 HC NON-CHARGEABLE SUPPLY: Performed by: INTERNAL MEDICINE

## 2025-06-01 PROCEDURE — 93306 TTE W/DOPPLER COMPLETE: CPT

## 2025-06-01 PROCEDURE — 6370000000 HC RX 637 (ALT 250 FOR IP): Performed by: CASE MANAGER/CARE COORDINATOR

## 2025-06-01 PROCEDURE — 96366 THER/PROPH/DIAG IV INF ADDON: CPT

## 2025-06-01 PROCEDURE — 6360000002 HC RX W HCPCS: Performed by: CASE MANAGER/CARE COORDINATOR

## 2025-06-01 RX ORDER — IOPAMIDOL 755 MG/ML
100 INJECTION, SOLUTION INTRAVASCULAR
Status: COMPLETED | OUTPATIENT
Start: 2025-06-01 | End: 2025-06-01

## 2025-06-01 RX ORDER — POTASSIUM CHLORIDE 7.45 MG/ML
10 INJECTION INTRAVENOUS PRN
Status: DISCONTINUED | OUTPATIENT
Start: 2025-06-01 | End: 2025-06-02 | Stop reason: HOSPADM

## 2025-06-01 RX ORDER — HEPARIN SODIUM 1000 [USP'U]/ML
4000 INJECTION, SOLUTION INTRAVENOUS; SUBCUTANEOUS PRN
Status: DISCONTINUED | OUTPATIENT
Start: 2025-06-01 | End: 2025-06-02 | Stop reason: HOSPADM

## 2025-06-01 RX ORDER — POLYETHYLENE GLYCOL 3350 17 G/17G
17 POWDER, FOR SOLUTION ORAL DAILY PRN
Status: DISCONTINUED | OUTPATIENT
Start: 2025-06-01 | End: 2025-06-02 | Stop reason: HOSPADM

## 2025-06-01 RX ORDER — IOPAMIDOL 755 MG/ML
INJECTION, SOLUTION INTRAVASCULAR PRN
Status: DISCONTINUED | OUTPATIENT
Start: 2025-06-01 | End: 2025-06-01 | Stop reason: HOSPADM

## 2025-06-01 RX ORDER — LISINOPRIL 20 MG/1
10 TABLET ORAL DAILY
Status: DISCONTINUED | OUTPATIENT
Start: 2025-06-01 | End: 2025-06-02 | Stop reason: HOSPADM

## 2025-06-01 RX ORDER — HEPARIN SODIUM 10000 [USP'U]/100ML
5-30 INJECTION, SOLUTION INTRAVENOUS CONTINUOUS
Status: DISCONTINUED | OUTPATIENT
Start: 2025-06-01 | End: 2025-06-02 | Stop reason: HOSPADM

## 2025-06-01 RX ORDER — SODIUM CHLORIDE 0.9 % (FLUSH) 0.9 %
5-40 SYRINGE (ML) INJECTION EVERY 12 HOURS SCHEDULED
Status: DISCONTINUED | OUTPATIENT
Start: 2025-06-01 | End: 2025-06-02 | Stop reason: HOSPADM

## 2025-06-01 RX ORDER — NITROGLYCERIN 20 MG/100ML
INJECTION INTRAVENOUS PRN
Status: DISCONTINUED | OUTPATIENT
Start: 2025-06-01 | End: 2025-06-01 | Stop reason: HOSPADM

## 2025-06-01 RX ORDER — FAMOTIDINE 20 MG/1
20 TABLET, FILM COATED ORAL NIGHTLY
Status: DISCONTINUED | OUTPATIENT
Start: 2025-06-01 | End: 2025-06-02 | Stop reason: HOSPADM

## 2025-06-01 RX ORDER — SODIUM CHLORIDE 0.9 % (FLUSH) 0.9 %
5-40 SYRINGE (ML) INJECTION PRN
Status: DISCONTINUED | OUTPATIENT
Start: 2025-06-01 | End: 2025-06-02 | Stop reason: HOSPADM

## 2025-06-01 RX ORDER — LIDOCAINE HYDROCHLORIDE 10 MG/ML
INJECTION, SOLUTION INFILTRATION; PERINEURAL PRN
Status: DISCONTINUED | OUTPATIENT
Start: 2025-06-01 | End: 2025-06-01 | Stop reason: HOSPADM

## 2025-06-01 RX ORDER — MAGNESIUM SULFATE IN WATER 40 MG/ML
2000 INJECTION, SOLUTION INTRAVENOUS PRN
Status: DISCONTINUED | OUTPATIENT
Start: 2025-06-01 | End: 2025-06-02 | Stop reason: HOSPADM

## 2025-06-01 RX ORDER — CETIRIZINE HYDROCHLORIDE 10 MG/1
10 TABLET ORAL DAILY
Status: DISCONTINUED | OUTPATIENT
Start: 2025-06-01 | End: 2025-06-02 | Stop reason: HOSPADM

## 2025-06-01 RX ORDER — DULOXETIN HYDROCHLORIDE 30 MG/1
30 CAPSULE, DELAYED RELEASE ORAL DAILY
Status: DISCONTINUED | OUTPATIENT
Start: 2025-06-01 | End: 2025-06-02 | Stop reason: HOSPADM

## 2025-06-01 RX ORDER — ASPIRIN 81 MG/1
81 TABLET, CHEWABLE ORAL ONCE
Status: COMPLETED | OUTPATIENT
Start: 2025-06-01 | End: 2025-06-01

## 2025-06-01 RX ORDER — HEPARIN SODIUM 1000 [USP'U]/ML
2000 INJECTION, SOLUTION INTRAVENOUS; SUBCUTANEOUS PRN
Status: DISCONTINUED | OUTPATIENT
Start: 2025-06-01 | End: 2025-06-02 | Stop reason: HOSPADM

## 2025-06-01 RX ORDER — HEPARIN SODIUM 1000 [USP'U]/ML
4000 INJECTION, SOLUTION INTRAVENOUS; SUBCUTANEOUS ONCE
Status: COMPLETED | OUTPATIENT
Start: 2025-06-01 | End: 2025-06-01

## 2025-06-01 RX ORDER — SODIUM CHLORIDE 9 MG/ML
INJECTION, SOLUTION INTRAVENOUS PRN
Status: DISCONTINUED | OUTPATIENT
Start: 2025-06-01 | End: 2025-06-02 | Stop reason: HOSPADM

## 2025-06-01 RX ORDER — MIDAZOLAM HYDROCHLORIDE 1 MG/ML
INJECTION, SOLUTION INTRAMUSCULAR; INTRAVENOUS PRN
Status: DISCONTINUED | OUTPATIENT
Start: 2025-06-01 | End: 2025-06-01 | Stop reason: HOSPADM

## 2025-06-01 RX ORDER — ACETAMINOPHEN 325 MG/1
650 TABLET ORAL EVERY 6 HOURS PRN
Status: DISCONTINUED | OUTPATIENT
Start: 2025-06-01 | End: 2025-06-02 | Stop reason: HOSPADM

## 2025-06-01 RX ORDER — FUROSEMIDE 10 MG/ML
40 INJECTION INTRAMUSCULAR; INTRAVENOUS ONCE
Status: COMPLETED | OUTPATIENT
Start: 2025-06-01 | End: 2025-06-01

## 2025-06-01 RX ORDER — SODIUM CHLORIDE 9 MG/ML
INJECTION, SOLUTION INTRAVENOUS CONTINUOUS
Status: ACTIVE | OUTPATIENT
Start: 2025-06-01 | End: 2025-06-01

## 2025-06-01 RX ORDER — ROSUVASTATIN CALCIUM 20 MG/1
20 TABLET, COATED ORAL NIGHTLY
Status: DISCONTINUED | OUTPATIENT
Start: 2025-06-01 | End: 2025-06-02 | Stop reason: HOSPADM

## 2025-06-01 RX ORDER — ACETAMINOPHEN 650 MG/1
650 SUPPOSITORY RECTAL EVERY 6 HOURS PRN
Status: DISCONTINUED | OUTPATIENT
Start: 2025-06-01 | End: 2025-06-02 | Stop reason: HOSPADM

## 2025-06-01 RX ORDER — POTASSIUM CHLORIDE 1500 MG/1
40 TABLET, EXTENDED RELEASE ORAL PRN
Status: DISCONTINUED | OUTPATIENT
Start: 2025-06-01 | End: 2025-06-02 | Stop reason: HOSPADM

## 2025-06-01 RX ORDER — PANTOPRAZOLE SODIUM 40 MG/1
40 TABLET, DELAYED RELEASE ORAL DAILY
Status: DISCONTINUED | OUTPATIENT
Start: 2025-06-01 | End: 2025-06-02 | Stop reason: HOSPADM

## 2025-06-01 RX ADMIN — FAMOTIDINE 20 MG: 20 TABLET, FILM COATED ORAL at 21:05

## 2025-06-01 RX ADMIN — DULOXETINE HYDROCHLORIDE 30 MG: 30 CAPSULE, DELAYED RELEASE ORAL at 09:01

## 2025-06-01 RX ADMIN — ACETAMINOPHEN 650 MG: 325 TABLET ORAL at 21:05

## 2025-06-01 RX ADMIN — CETIRIZINE HYDROCHLORIDE 10 MG: 10 TABLET, FILM COATED ORAL at 09:01

## 2025-06-01 RX ADMIN — FUROSEMIDE 40 MG: 10 INJECTION, SOLUTION INTRAMUSCULAR; INTRAVENOUS at 17:53

## 2025-06-01 RX ADMIN — SODIUM CHLORIDE, PRESERVATIVE FREE 10 ML: 5 INJECTION INTRAVENOUS at 21:05

## 2025-06-01 RX ADMIN — SODIUM CHLORIDE, PRESERVATIVE FREE 10 ML: 5 INJECTION INTRAVENOUS at 09:01

## 2025-06-01 RX ADMIN — HEPARIN SODIUM 9 UNITS/KG/HR: 10000 INJECTION, SOLUTION INTRAVENOUS at 03:58

## 2025-06-01 RX ADMIN — SODIUM CHLORIDE: 0.9 INJECTION, SOLUTION INTRAVENOUS at 05:01

## 2025-06-01 RX ADMIN — PANTOPRAZOLE SODIUM 40 MG: 40 TABLET, DELAYED RELEASE ORAL at 09:01

## 2025-06-01 RX ADMIN — ACETAMINOPHEN 650 MG: 325 TABLET ORAL at 12:21

## 2025-06-01 RX ADMIN — HEPARIN SODIUM 4000 UNITS: 1000 INJECTION INTRAVENOUS; SUBCUTANEOUS at 03:57

## 2025-06-01 RX ADMIN — ASPIRIN 81 MG: 81 TABLET, CHEWABLE ORAL at 09:01

## 2025-06-01 RX ADMIN — ROSUVASTATIN CALCIUM 20 MG: 20 TABLET, FILM COATED ORAL at 21:05

## 2025-06-01 RX ADMIN — IOPAMIDOL 100 ML: 755 INJECTION, SOLUTION INTRAVENOUS at 15:17

## 2025-06-01 ASSESSMENT — PAIN DESCRIPTION - ORIENTATION: ORIENTATION: LEFT

## 2025-06-01 ASSESSMENT — PAIN DESCRIPTION - DESCRIPTORS: DESCRIPTORS: SORE

## 2025-06-01 ASSESSMENT — PAIN SCALES - GENERAL: PAINLEVEL_OUTOF10: 3

## 2025-06-01 ASSESSMENT — PAIN DESCRIPTION - LOCATION: LOCATION: SHOULDER

## 2025-06-01 NOTE — PLAN OF CARE
Problem: Chronic Conditions and Co-morbidities  Goal: Patient's chronic conditions and co-morbidity symptoms are monitored and maintained or improved  Outcome: Progressing     Problem: Discharge Planning  Goal: Discharge to home or other facility with appropriate resources  Outcome: Progressing     Problem: Safety - Adult  Goal: Free from fall injury  Outcome: Progressing     Problem: Respiratory - Adult  Goal: Achieves optimal ventilation and oxygenation  Outcome: Progressing     Problem: Cardiovascular - Adult  Goal: Maintains optimal cardiac output and hemodynamic stability  Outcome: Progressing  Flowsheets (Taken 6/1/2025 0320)  Maintains optimal cardiac output and hemodynamic stability:   Monitor blood pressure and heart rate   Monitor urine output and notify Licensed Independent Practitioner for values outside of normal range   Assess for signs of decreased cardiac output   Administer fluid and/or volume expanders as ordered  Goal: Absence of cardiac dysrhythmias or at baseline  Outcome: Progressing     Problem: Metabolic/Fluid and Electrolytes - Adult  Goal: Electrolytes maintained within normal limits  Outcome: Progressing  Flowsheets (Taken 6/1/2025 0320)  Electrolytes maintained within normal limits:   Monitor labs and assess patient for signs and symptoms of electrolyte imbalances   Monitor response to electrolyte replacements, including repeat lab results as appropriate   Administer electrolyte replacement as ordered   Fluid restriction as ordered  Goal: Hemodynamic stability and optimal renal function maintained  Outcome: Progressing

## 2025-06-01 NOTE — PROGRESS NOTES
4 Eyes Skin Assessment     NAME:  Rosey Pavon  YOB: 1950  MEDICAL RECORD NUMBER:  [id number]    The patient is being assessed for  Admission    I agree that at least one RN has performed a thorough Head to Toe Skin Assessment on the patient. ALL assessment sites listed below have been assessed.      Areas assessed by both nurses:    Sacrum. Buttock, Coccyx, Ischium and Legs. Feet and Heels        Does the Patient have a Wound? No noted wound(s)       Ronn Prevention initiated by RN: No  Wound Care Orders initiated by RN: No    Pressure Injury (Stage 3,4, Unstageable, DTI, NWPT, and Complex wounds) if present, place Wound referral order by RN under : No    New Ostomies, if present place, Ostomy referral order under : No     Nurse 1 eSignature: Electronically signed by BERNIE SANCHEZ RN on 6/1/25 at 2:59 AM EDT    **SHARE this note so that the co-signing nurse can place an eSignature**    Nurse 2 eSignature: Electronically signed by ASTON MAE RN on 6/1/25 at 3:08 AM EDT

## 2025-06-01 NOTE — CARE COORDINATION
MSN, CM:  spoke with patient this AM about discharge planning.  Patient lives with his daughter, son in law, and 3 grandchildren in own home with 2 steps for entrance.  Patient is independent with all ADL's and requires a cane PRN.  Patient denies any HH, rehab, palliative care, or home oxygen currently.  Patient is retired but is able to drive himself to all appointments.  Patient was admitted for chest pain and dizziness.  EKG revealed LBBB.  Heparin gtt started.  ECHO pending.  RHC/LHC possibly needed.  Patient current on room air which is baseline.  Patient has no discharge needs at this time.  Case Management will continue to follow for any discharge needs that may arise.       06/01/25 0903   Service Assessment   Patient Orientation Alert and Oriented   Cognition Alert   History Provided By Patient   Primary Caregiver Self   Accompanied By/Relationship Daughter in law   Support Systems Children;Family Members   Patient's Healthcare Decision Maker is: Legal Next of Kin   PCP Verified by CM Yes   Last Visit to PCP Within last 3 months   Prior Functional Level Independent in ADLs/IADLs   Current Functional Level Independent in ADLs/IADLs   Can patient return to prior living arrangement Yes   Ability to make needs known: Good   Family able to assist with home care needs: Yes   Would you like for me to discuss the discharge plan with any other family members/significant others, and if so, who? Yes  (family)   Financial Resources Medicare   Community Resources None   Social/Functional History   Lives With Family;Daughter  (daughter, son in law, and 3 grandchildren)   Type of Home House   Home Layout One level   Home Access Stairs to enter without rails   Entrance Stairs - Number of Steps 2   Bathroom Shower/Tub Tub/Shower unit   Bathroom Toilet Standard   Bathroom Equipment None   Bathroom Accessibility Accessible   Home Equipment Cane   Receives Help From Family   Prior Level of Assist for ADLs Independent   Prior

## 2025-06-01 NOTE — H&P
[facility] Initial Cardiac Evaluation                Date of  Admission: 6/1/2025  2:11 AM       Primary Cardiologist:Dr. Clark  Referring Physician: Dr. Ortega  Supervising Physician: Dr. Shane    Reason for consult/admission: Chest Pain      Rosey Pavon is a 75 y.o. female with past medical history of  Chronic Diastolic Heart Failure (EF 70-75% 2023), pHTN (RVSP 44mmHg 2023), LBBB, HTN,  DORA, Breast Ca (2020) who presented to the [facility] ER in [place] on 5/31 with non-radiating  \"10/10\" chest pain/pressure that began around 5pm while loading clothes in the dryer.  States pain felt like one of her grandchildren was sitting on her chest.  She had associated symptoms of dizziness.  She attempted to sit down to rest without resolution of symptoms, however, they continued and she decided to head to the ER for further evaluation.     Results from [facility] ER:  Vitals: 117/57, HR 98, T 98.8F, O2 95% on RA, RR 18  EKG negative for acute ischemia although not available for review.  CXR: impression of pHTN and cardiomegaly  Labwork: Na 136, K 4.5, No Mg ordered, BUN 8, Cr 1.09, WBC 6.73, HGB 13.1, .  Troponin T 24, 34 respectively    Since pt is followed by [facility], request for admission and subsequent transfer here was made.  On arrival, she is asymptomatic.  EKG showing stable LBBB with no evidence of acute ischemia.        Past Medical History:   Diagnosis Date    Arthritis     osteo    Breast cancer (HCC) 2001    had chemo and radiation for breast cancer in left breast    Breast cancer (HCC) dx 9/17/20    right breast-- lumpectomy/ plans for chemo and radiation--- followed by dr velazquez    Chronic kidney disease     stage 3 kidney disease--- followed by dr. mary BUNDY (gastroesophageal reflux disease)      prn meds    Hypercholesterolemia     Hypertension     controlled with med    Nausea & vomiting     post op n/v     Sleep apnea     Thyroid disease     hypo      Past

## 2025-06-01 NOTE — PROGRESS NOTES
Radial compression band removed at 1700 after slowly reducing air from 10 cc to zero as per hospital protocol.  No bleeding or hematoma noted. 2 x 2 gauze with tegaderm placed over puncture site. The affected extremity is warm and dry to the touch. Patient instructed to call if any bleeding noted on gauze.  Patient verbalized understanding the nursing instructions.

## 2025-06-01 NOTE — PROGRESS NOTES
[facility] Dr Brito  R Snuff access - 10 ml air in band @ 1055  Heparin 5000 in RC  Versed 2 mg IV    Report to Mary Alice LEE

## 2025-06-01 NOTE — PLAN OF CARE
Problem: Discharge Planning  Goal: Discharge to home or other facility with appropriate resources  Outcome: Progressing     Problem: Chronic Conditions and Co-morbidities  Goal: Patient's chronic conditions and co-morbidity symptoms are monitored and maintained or improved  Outcome: Progressing     Problem: Safety - Adult  Goal: Free from fall injury  Outcome: Progressing     Problem: Respiratory - Adult  Goal: Achieves optimal ventilation and oxygenation  Outcome: Progressing     Problem: Cardiovascular - Adult  Goal: Maintains optimal cardiac output and hemodynamic stability  Outcome: Progressing  Goal: Absence of cardiac dysrhythmias or at baseline  Outcome: Progressing     Problem: Metabolic/Fluid and Electrolytes - Adult  Goal: Hemodynamic stability and optimal renal function maintained  Outcome: Progressing

## 2025-06-01 NOTE — ACP (ADVANCE CARE PLANNING)
Advance Care Planning   General Advance Care Planning (ACP) Conversation    Date of Conversation: 5/31/2025  Conducted with: Patient with Decision Making Capacity  Other persons present: None    Healthcare Decision Maker:    Primary Decision Maker: Ravi Diane - [facility] - [phone]    Today we documented Decision Maker(s) consistent with Legal Next of Kin hierarchy.  Content/Action Overview:  Has ACP document(s) NOT on file - requested patient to provide  Reviewed DNR/DNI and patient elects Full Code (Attempt Resuscitation)        Length of Voluntary ACP Conversation in minutes:  <16 minutes (Non-Billable)    Marva Laureano RN

## 2025-06-02 ENCOUNTER — TELEPHONE (OUTPATIENT)
Dept: PULMONOLOGY | Age: 75
End: 2025-06-02

## 2025-06-02 VITALS
BODY MASS INDEX: 36.96 KG/M2 | RESPIRATION RATE: 18 BRPM | WEIGHT: 221.8 LBS | DIASTOLIC BLOOD PRESSURE: 82 MMHG | HEIGHT: 65 IN | HEART RATE: 103 BPM | SYSTOLIC BLOOD PRESSURE: 150 MMHG | TEMPERATURE: 98.2 F | OXYGEN SATURATION: 92 %

## 2025-06-02 LAB
ALBUMIN SERPL-MCNC: 3 G/DL (ref 3.2–4.6)
ALBUMIN/GLOB SERPL: 0.8 (ref 1–1.9)
ALP SERPL-CCNC: 96 U/L (ref 35–104)
ALT SERPL-CCNC: 10 U/L (ref 8–45)
ANION GAP SERPL CALC-SCNC: 11 MMOL/L (ref 7–16)
AST SERPL-CCNC: 20 U/L (ref 15–37)
BASOPHILS # BLD: 0.03 K/UL (ref 0–0.2)
BASOPHILS NFR BLD: 0.6 % (ref 0–2)
BILIRUB SERPL-MCNC: 0.4 MG/DL (ref 0–1.2)
BUN SERPL-MCNC: 11 MG/DL (ref 8–23)
CALCIUM SERPL-MCNC: 9.7 MG/DL (ref 8.8–10.2)
CHLORIDE SERPL-SCNC: 104 MMOL/L (ref 98–107)
CHOLEST SERPL-MCNC: 119 MG/DL (ref 0–200)
CO2 SERPL-SCNC: 25 MMOL/L (ref 20–29)
CREAT SERPL-MCNC: 1.03 MG/DL (ref 0.6–1.1)
DIFFERENTIAL METHOD BLD: ABNORMAL
ECHO AO ASC DIAM: 3 CM
ECHO AO ASCENDING AORTA INDEX: 1.44 CM/M2
ECHO AO ROOT DIAM: 2.7 CM
ECHO AO ROOT INDEX: 1.29 CM/M2
ECHO AV ACCELERATION TIME: 84 MS
ECHO AV AREA PEAK VELOCITY: 1.8 CM2
ECHO AV AREA VTI: 2.1 CM2
ECHO AV AREA/BSA PEAK VELOCITY: 0.9 CM2/M2
ECHO AV AREA/BSA VTI: 1 CM2/M2
ECHO AV MEAN GRADIENT: 10 MMHG
ECHO AV MEAN VELOCITY: 1.5 M/S
ECHO AV PEAK GRADIENT: 20 MMHG
ECHO AV PEAK VELOCITY: 2.2 M/S
ECHO AV VELOCITY RATIO: 0.64
ECHO AV VTI: 39.2 CM
ECHO BSA: 2.18 M2
ECHO EST RA PRESSURE: 3 MMHG
ECHO IVC PROX: 2 CM
ECHO LA AREA 2C: 29.7 CM2
ECHO LA AREA 4C: 29.9 CM2
ECHO LA DIAMETER INDEX: 2.2 CM/M2
ECHO LA DIAMETER: 4.6 CM
ECHO LA MAJOR AXIS: 6.7 CM
ECHO LA MINOR AXIS: 6.4 CM
ECHO LA TO AORTIC ROOT RATIO: 1.7
ECHO LA VOL BP: 111 ML (ref 22–52)
ECHO LA VOL MOD A2C: 111 ML (ref 22–52)
ECHO LA VOL MOD A4C: 107 ML (ref 22–52)
ECHO LA VOL/BSA BIPLANE: 53 ML/M2 (ref 16–34)
ECHO LA VOLUME INDEX MOD A2C: 53 ML/M2 (ref 16–34)
ECHO LA VOLUME INDEX MOD A4C: 51 ML/M2 (ref 16–34)
ECHO LV E' LATERAL VELOCITY: 5.66 CM/S
ECHO LV E' SEPTAL VELOCITY: 6.2 CM/S
ECHO LV EDV A2C: 92 ML
ECHO LV EDV A4C: 96 ML
ECHO LV EDV INDEX A4C: 46 ML/M2
ECHO LV EDV NDEX A2C: 44 ML/M2
ECHO LV EF PHYSICIAN: 65 %
ECHO LV EJECTION FRACTION A2C: 66 %
ECHO LV EJECTION FRACTION A4C: 65 %
ECHO LV EJECTION FRACTION BIPLANE: 65 % (ref 55–100)
ECHO LV ESV A2C: 31 ML
ECHO LV ESV A4C: 34 ML
ECHO LV ESV INDEX A2C: 15 ML/M2
ECHO LV ESV INDEX A4C: 16 ML/M2
ECHO LV FRACTIONAL SHORTENING: 26 % (ref 28–44)
ECHO LV INTERNAL DIMENSION DIASTOLE INDEX: 2.01 CM/M2
ECHO LV INTERNAL DIMENSION DIASTOLIC: 4.2 CM (ref 3.9–5.3)
ECHO LV INTERNAL DIMENSION SYSTOLIC INDEX: 1.48 CM/M2
ECHO LV INTERNAL DIMENSION SYSTOLIC: 3.1 CM
ECHO LV IVSD: 1 CM (ref 0.6–0.9)
ECHO LV MASS 2D: 137.2 G (ref 67–162)
ECHO LV MASS INDEX 2D: 65.7 G/M2 (ref 43–95)
ECHO LV POSTERIOR WALL DIASTOLIC: 1 CM (ref 0.6–0.9)
ECHO LV RELATIVE WALL THICKNESS RATIO: 0.48
ECHO LVOT AREA: 3.1 CM2
ECHO LVOT AV VTI INDEX: 0.69
ECHO LVOT DIAM: 2 CM
ECHO LVOT MEAN GRADIENT: 4 MMHG
ECHO LVOT PEAK GRADIENT: 8 MMHG
ECHO LVOT PEAK VELOCITY: 1.4 M/S
ECHO LVOT STROKE VOLUME INDEX: 40.4 ML/M2
ECHO LVOT SV: 84.5 ML
ECHO LVOT VTI: 26.9 CM
ECHO MV A VELOCITY: 1.99 M/S
ECHO MV AREA VTI: 1.6 CM2
ECHO MV E DECELERATION TIME (DT): 339 MS
ECHO MV E VELOCITY: 1.76 M/S
ECHO MV E/A RATIO: 0.88
ECHO MV E/E' LATERAL: 31.1
ECHO MV E/E' RATIO (AVERAGED): 29.74
ECHO MV E/E' SEPTAL: 28.39
ECHO MV LVOT VTI INDEX: 1.93
ECHO MV MAX VELOCITY: 2.1 M/S
ECHO MV MEAN GRADIENT: 11 MMHG
ECHO MV MEAN VELOCITY: 1.6 M/S
ECHO MV PEAK GRADIENT: 17 MMHG
ECHO MV VTI: 51.9 CM
ECHO PV ACCELERATION TIME (AT): 71 MS
ECHO PV MAX VELOCITY: 1.3 M/S
ECHO PV PEAK GRADIENT: 7 MMHG
ECHO RIGHT VENTRICULAR SYSTOLIC PRESSURE (RVSP): 46 MMHG
ECHO RV BASAL DIMENSION: 3.6 CM
ECHO RV FREE WALL PEAK S': 14.1 CM/S
ECHO RV TAPSE: 1.8 CM (ref 1.7–?)
ECHO TV REGURGITANT MAX VELOCITY: 3.26 M/S
ECHO TV REGURGITANT PEAK GRADIENT: 43 MMHG
EOSINOPHIL # BLD: 0.33 K/UL (ref 0–0.8)
EOSINOPHIL NFR BLD: 6.7 % (ref 0.5–7.8)
ERYTHROCYTE [DISTWIDTH] IN BLOOD BY AUTOMATED COUNT: 13.9 % (ref 11.9–14.6)
EST. AVERAGE GLUCOSE BLD GHB EST-MCNC: 107 MG/DL
GLOBULIN SER CALC-MCNC: 3.9 G/DL (ref 2.3–3.5)
GLUCOSE SERPL-MCNC: 99 MG/DL (ref 70–99)
HBA1C MFR BLD: 5.4 % (ref 0–5.6)
HCT VFR BLD AUTO: 39.3 % (ref 35.8–46.3)
HDLC SERPL-MCNC: 47 MG/DL (ref 40–60)
HDLC SERPL: 2.6 (ref 0–5)
HGB BLD-MCNC: 12.5 G/DL (ref 11.7–15.4)
IMM GRANULOCYTES # BLD AUTO: 0.01 K/UL (ref 0–0.5)
IMM GRANULOCYTES NFR BLD AUTO: 0.2 % (ref 0–5)
LDLC SERPL CALC-MCNC: 60 MG/DL (ref 0–100)
LYMPHOCYTES # BLD: 1 K/UL (ref 0.5–4.6)
LYMPHOCYTES NFR BLD: 20.4 % (ref 13–44)
MAGNESIUM SERPL-MCNC: 2 MG/DL (ref 1.8–2.4)
MCH RBC QN AUTO: 29.6 PG (ref 26.1–32.9)
MCHC RBC AUTO-ENTMCNC: 31.8 G/DL (ref 31.4–35)
MCV RBC AUTO: 93.1 FL (ref 82–102)
MONOCYTES # BLD: 0.57 K/UL (ref 0.1–1.3)
MONOCYTES NFR BLD: 11.6 % (ref 4–12)
NEUTS SEG # BLD: 2.97 K/UL (ref 1.7–8.2)
NEUTS SEG NFR BLD: 60.5 % (ref 43–78)
NRBC # BLD: 0 K/UL (ref 0–0.2)
NT PRO BNP: 593 PG/ML (ref 0–450)
PLATELET # BLD AUTO: 132 K/UL (ref 150–450)
PMV BLD AUTO: 11.3 FL (ref 9.4–12.3)
POTASSIUM SERPL-SCNC: 4.1 MMOL/L (ref 3.5–5.1)
PROT SERPL-MCNC: 6.9 G/DL (ref 6.3–8.2)
RBC # BLD AUTO: 4.22 M/UL (ref 4.05–5.2)
SODIUM SERPL-SCNC: 140 MMOL/L (ref 136–145)
TRIGL SERPL-MCNC: 64 MG/DL (ref 0–150)
VLDLC SERPL CALC-MCNC: 13 MG/DL (ref 6–23)
WBC # BLD AUTO: 4.9 K/UL (ref 4.3–11.1)

## 2025-06-02 PROCEDURE — 80053 COMPREHEN METABOLIC PANEL: CPT

## 2025-06-02 PROCEDURE — 6370000000 HC RX 637 (ALT 250 FOR IP): Performed by: CASE MANAGER/CARE COORDINATOR

## 2025-06-02 PROCEDURE — 99222 1ST HOSP IP/OBS MODERATE 55: CPT | Performed by: INTERNAL MEDICINE

## 2025-06-02 PROCEDURE — G0378 HOSPITAL OBSERVATION PER HR: HCPCS

## 2025-06-02 PROCEDURE — 80061 LIPID PANEL: CPT

## 2025-06-02 PROCEDURE — 36415 COLL VENOUS BLD VENIPUNCTURE: CPT

## 2025-06-02 PROCEDURE — 93306 TTE W/DOPPLER COMPLETE: CPT | Performed by: INTERNAL MEDICINE

## 2025-06-02 PROCEDURE — 2500000003 HC RX 250 WO HCPCS: Performed by: CASE MANAGER/CARE COORDINATOR

## 2025-06-02 PROCEDURE — 96366 THER/PROPH/DIAG IV INF ADDON: CPT

## 2025-06-02 PROCEDURE — 83735 ASSAY OF MAGNESIUM: CPT

## 2025-06-02 PROCEDURE — 85025 COMPLETE CBC W/AUTO DIFF WBC: CPT

## 2025-06-02 PROCEDURE — 83036 HEMOGLOBIN GLYCOSYLATED A1C: CPT

## 2025-06-02 PROCEDURE — 83880 ASSAY OF NATRIURETIC PEPTIDE: CPT

## 2025-06-02 RX ORDER — ALBUTEROL SULFATE 90 UG/1
2 INHALANT RESPIRATORY (INHALATION) EVERY 6 HOURS PRN
Qty: 18 G | Refills: 0 | Status: SHIPPED | OUTPATIENT
Start: 2025-06-02

## 2025-06-02 RX ADMIN — CETIRIZINE HYDROCHLORIDE 10 MG: 10 TABLET, FILM COATED ORAL at 08:23

## 2025-06-02 RX ADMIN — DULOXETINE HYDROCHLORIDE 30 MG: 30 CAPSULE, DELAYED RELEASE ORAL at 08:23

## 2025-06-02 RX ADMIN — SODIUM CHLORIDE, PRESERVATIVE FREE 10 ML: 5 INJECTION INTRAVENOUS at 08:29

## 2025-06-02 RX ADMIN — PANTOPRAZOLE SODIUM 40 MG: 40 TABLET, DELAYED RELEASE ORAL at 08:23

## 2025-06-02 NOTE — PLAN OF CARE
Problem: Chronic Conditions and Co-morbidities  Goal: Patient's chronic conditions and co-morbidity symptoms are monitored and maintained or improved  6/2/2025 1157 by Caro Medina RN  Outcome: Completed  6/2/2025 0945 by Brooke Leach RN  Outcome: Progressing  Flowsheets (Taken 6/2/2025 0823)  Care Plan - Patient's Chronic Conditions and Co-Morbidity Symptoms are Monitored and Maintained or Improved:   Monitor and assess patient's chronic conditions and comorbid symptoms for stability, deterioration, or improvement   Collaborate with multidisciplinary team to address chronic and comorbid conditions and prevent exacerbation or deterioration   Update acute care plan with appropriate goals if chronic or comorbid symptoms are exacerbated and prevent overall improvement and discharge     Problem: Discharge Planning  Goal: Discharge to home or other facility with appropriate resources  6/2/2025 1157 by Caro Medina RN  Outcome: Completed  6/2/2025 0945 by Brooke Leach RN  Outcome: Progressing  Flowsheets (Taken 6/2/2025 0823)  Discharge to home or other facility with appropriate resources:   Identify barriers to discharge with patient and caregiver   Arrange for needed discharge resources and transportation as appropriate   Identify discharge learning needs (meds, wound care, etc)   Refer to discharge planning if patient needs post-hospital services based on physician order or complex needs related to functional status, cognitive ability or social support system     Problem: Safety - Adult  Goal: Free from fall injury  6/2/2025 1157 by Caro Medina RN  Outcome: Completed  6/2/2025 0945 by Brooke Leach RN  Outcome: Progressing     Problem: Respiratory - Adult  Goal: Achieves optimal ventilation and oxygenation  6/2/2025 1157 by Caro Medina RN  Outcome: Completed  6/2/2025 0945 by Brooke Leach RN  Outcome: Progressing  Flowsheets (Taken 6/2/2025 0823)  Achieves optimal ventilation and

## 2025-06-02 NOTE — TELEPHONE ENCOUNTER
The patient was contacted regarding the results of her DESTINEY test at 3 liters per minute (lpm) with APAP. Following a direct discussion with Dr. Dyer, it was advised that she increase her oxygen flow to 4 lpm and repeat the DESTINEY test with these new settings. Additionally, a follow-up appointment in sleep was recommended. During the conversation, the patient mentioned that she has not been using the oxygen with her APAP machine and has not utilized her home oxygen for over six months. She expressed that MsDanny Ashley Gavin did not instruct her to use the oxygen and that she is unsure how to connect it to the APAP device.     Please arrange a follow-up appointment for the patient in sleep medicine. Thank you!

## 2025-06-02 NOTE — DISCHARGE SUMMARY
times daily     Magnesium 500 MG Tabs     meloxicam 3.75 MG Tabs split-tablet  Commonly known as: MOBIC     ondansetron 8 MG tablet  Commonly known as: ZOFRAN     pantoprazole 40 MG tablet  Commonly known as: Protonix  Take 1 tablet by mouth daily     QC POTASSIUM PO     rosuvastatin 10 MG tablet  Commonly known as: CRESTOR     Synthroid 112 MCG tablet  Generic drug: levothyroxine     traMADol 50 MG tablet  Commonly known as: ULTRAM     Tylenol 325 MG tablet  Generic drug: acetaminophen     Vitamin D3 250 MCG (57151 UT) Caps               Where to Get Your Medications        These medications were sent to [facility]/pharmacy #[id number] - [place] - [address] [phone] -  [phone]  [address] [place] [address]      Phone: [phone]   albuterol sulfate  (90 Base) MCG/ACT inhaler           Signed:  ROBIN Gaffney  6/2/2025  11:51 AM

## 2025-06-02 NOTE — CARE COORDINATION
Discharge order is in. Pt is discharging home today in stable condition. No discharge needs identified by lexie CUENCA goals met.     06/02/25 1156   Services At/After Discharge   Transition of Care Consult (CM Consult) Discharge Planning   Services At/After Discharge None   [place] Resource Information Provided? No   Mode of Transport at Discharge Other (see comment)  (Family)   Confirm Follow Up Transport Family   Condition of Participation: Discharge Planning   The Patient and/or Patient Representative was provided with a Choice of Provider? Patient   The Patient and/Or Patient Representative agree with the Discharge Plan? Yes   Freedom of Choice list was provided with basic dialogue that supports the patient's individualized plan of care/goals, treatment preferences, and shares the quality data associated with the providers?  Yes

## 2025-06-02 NOTE — PROGRESS NOTES
Discharge instructions were reviewed with patient. An opportunity was given for questions. All medications were reviewed, and information was given on new medications, if any. Patient verbalized understanding, and has no questions at this time. IV's and telemetry box removed.

## 2025-06-02 NOTE — PLAN OF CARE
Problem: Chronic Conditions and Co-morbidities  Goal: Patient's chronic conditions and co-morbidity symptoms are monitored and maintained or improved  Outcome: Progressing  Flowsheets (Taken 6/2/2025 0823)  Care Plan - Patient's Chronic Conditions and Co-Morbidity Symptoms are Monitored and Maintained or Improved:   Monitor and assess patient's chronic conditions and comorbid symptoms for stability, deterioration, or improvement   Collaborate with multidisciplinary team to address chronic and comorbid conditions and prevent exacerbation or deterioration   Update acute care plan with appropriate goals if chronic or comorbid symptoms are exacerbated and prevent overall improvement and discharge     Problem: Discharge Planning  Goal: Discharge to home or other facility with appropriate resources  Outcome: Progressing  Flowsheets (Taken 6/2/2025 0823)  Discharge to home or other facility with appropriate resources:   Identify barriers to discharge with patient and caregiver   Arrange for needed discharge resources and transportation as appropriate   Identify discharge learning needs (meds, wound care, etc)   Refer to discharge planning if patient needs post-hospital services based on physician order or complex needs related to functional status, cognitive ability or social support system     Problem: Safety - Adult  Goal: Free from fall injury  Outcome: Progressing     Problem: Respiratory - Adult  Goal: Achieves optimal ventilation and oxygenation  Outcome: Progressing  Flowsheets (Taken 6/2/2025 0823)  Achieves optimal ventilation and oxygenation:   Assess for changes in respiratory status   Assess for changes in mentation and behavior   Position to facilitate oxygenation and minimize respiratory effort     Problem: Cardiovascular - Adult  Goal: Maintains optimal cardiac output and hemodynamic stability  Outcome: Progressing  Flowsheets (Taken 6/2/2025 0823)  Maintains optimal cardiac output and hemodynamic

## 2025-06-04 ASSESSMENT — SLEEP AND FATIGUE QUESTIONNAIRES
HOW LIKELY ARE YOU TO NOD OFF OR FALL ASLEEP WHILE WATCHING TV: MODERATE CHANCE OF DOZING
HOW LIKELY ARE YOU TO NOD OFF OR FALL ASLEEP WHILE LYING DOWN TO REST IN THE AFTERNOON WHEN CIRCUMSTANCES PERMIT: MODERATE CHANCE OF DOZING
HOW LIKELY ARE YOU TO NOD OFF OR FALL ASLEEP WHILE SITTING QUIETLY AFTER LUNCH WITHOUT ALCOHOL: MODERATE CHANCE OF DOZING
HOW LIKELY ARE YOU TO NOD OFF OR FALL ASLEEP WHILE SITTING AND READING: MODERATE CHANCE OF DOZING
HOW LIKELY ARE YOU TO NOD OFF OR FALL ASLEEP WHILE SITTING QUIETLY AFTER LUNCH WITHOUT ALCOHOL: MODERATE CHANCE OF DOZING
HOW LIKELY ARE YOU TO NOD OFF OR FALL ASLEEP WHILE SITTING AND TALKING TO SOMEONE: MODERATE CHANCE OF DOZING
HOW LIKELY ARE YOU TO NOD OFF OR FALL ASLEEP WHEN YOU ARE A PASSENGER IN A CAR FOR AN HOUR WITHOUT A BREAK: MODERATE CHANCE OF DOZING
HOW LIKELY ARE YOU TO NOD OFF OR FALL ASLEEP WHILE WATCHING TV: MODERATE CHANCE OF DOZING
ESS TOTAL SCORE: 14
HOW LIKELY ARE YOU TO NOD OFF OR FALL ASLEEP WHILE SITTING INACTIVE IN A PUBLIC PLACE: MODERATE CHANCE OF DOZING
HOW LIKELY ARE YOU TO NOD OFF OR FALL ASLEEP IN A CAR, WHILE STOPPED FOR A FEW MINUTES IN TRAFFIC: WOULD NEVER DOZE
HOW LIKELY ARE YOU TO NOD OFF OR FALL ASLEEP WHEN YOU ARE A PASSENGER IN A CAR FOR AN HOUR WITHOUT A BREAK: MODERATE CHANCE OF DOZING
HOW LIKELY ARE YOU TO NOD OFF OR FALL ASLEEP WHILE SITTING AND TALKING TO SOMEONE: MODERATE CHANCE OF DOZING
HOW LIKELY ARE YOU TO NOD OFF OR FALL ASLEEP WHILE LYING DOWN TO REST IN THE AFTERNOON WHEN CIRCUMSTANCES PERMIT: MODERATE CHANCE OF DOZING
HOW LIKELY ARE YOU TO NOD OFF OR FALL ASLEEP WHILE SITTING AND READING: MODERATE CHANCE OF DOZING
HOW LIKELY ARE YOU TO NOD OFF OR FALL ASLEEP IN A CAR, WHILE STOPPED FOR A FEW MINUTES IN TRAFFIC: WOULD NEVER DOZE
HOW LIKELY ARE YOU TO NOD OFF OR FALL ASLEEP WHILE SITTING INACTIVE IN A PUBLIC PLACE: MODERATE CHANCE OF DOZING

## 2025-06-05 NOTE — PROGRESS NOTES
[facility]  [address]  [place] [address]  [phone]    Patient Name:  Rosey Pavon  YOB: 1950      Office Visit 6/6/2025    CHIEF COMPLAINT:    Chief Complaint   Patient presents with    Obstructive sleep apnea on CPAP         HISTORY OF PRESENT ILLNESS:  Patient is a 75 y.o. female seen today for follow up of sleep apnea.  Patient's sleep study in October 2024 revealed AHI of 27.6 with lowest desaturation 68%.  She is prescribed cpap therapy with a humidifier set at 12-15cm with a full face mask. Most recent download reveals AHI on PAP therapy is 2.8, leak is 58.1 and the hourly usage is 6 hours 9 minutes nightly. The overall use is 546 hours with days greater than four hours at 85/90. The patient is compliant with the Pap therapy and is feeling better as a result.     Since last visit patient has undergone mask size change which she states has helped.  She was also having trouble with her CPAP and not having the proper connection.  She states her daughter was able to fix this in the last few nights she has been doing very well with CPAP.  She is generally falling asleep easily and may awaken 1-2 times nightly.  She is rested in the morning, does have some daytime fatigue in the later afternoon and will nap on occasion.  Current Kilbourne score is 14/24.  She did have an [place] recently that did show significant desaturations however this was also when her machine was not functioning appropriately so we discussed checking a repeat [place] and adding oxygen back if needed.  She states pressures on CPAP feels fine.       Download    DESTINEY 5/14/25                Kilbourne Sleepiness Scale      6/4/2025     3:08 PM 8/19/2024    10:08 AM   Sleep Medicine   Sitting and reading 2 2   Watching TV 2 0   Sitting, inactive in a public place (e.g. a theatre or a meeting) 2 0   As a passenger in a car for an hour without a break 2 0   Lying down to rest in the afternoon when

## 2025-06-06 ENCOUNTER — OFFICE VISIT (OUTPATIENT)
Dept: SLEEP MEDICINE | Age: 75
End: 2025-06-06
Payer: MEDICARE

## 2025-06-06 VITALS
HEART RATE: 85 BPM | TEMPERATURE: 97.7 F | WEIGHT: 227.6 LBS | OXYGEN SATURATION: 96 % | RESPIRATION RATE: 20 BRPM | BODY MASS INDEX: 37.92 KG/M2 | HEIGHT: 65 IN | DIASTOLIC BLOOD PRESSURE: 67 MMHG | SYSTOLIC BLOOD PRESSURE: 128 MMHG

## 2025-06-06 DIAGNOSIS — G47.34 NOCTURNAL HYPOXEMIA: ICD-10-CM

## 2025-06-06 DIAGNOSIS — G47.33 OBSTRUCTIVE SLEEP APNEA ON CPAP: Primary | ICD-10-CM

## 2025-06-06 PROCEDURE — G2211 COMPLEX E/M VISIT ADD ON: HCPCS | Performed by: STUDENT IN AN ORGANIZED HEALTH CARE EDUCATION/TRAINING PROGRAM

## 2025-06-06 PROCEDURE — 99214 OFFICE O/P EST MOD 30 MIN: CPT | Performed by: STUDENT IN AN ORGANIZED HEALTH CARE EDUCATION/TRAINING PROGRAM

## 2025-06-06 PROCEDURE — 1123F ACP DISCUSS/DSCN MKR DOCD: CPT | Performed by: STUDENT IN AN ORGANIZED HEALTH CARE EDUCATION/TRAINING PROGRAM

## 2025-06-06 PROCEDURE — 1160F RVW MEDS BY RX/DR IN RCRD: CPT | Performed by: STUDENT IN AN ORGANIZED HEALTH CARE EDUCATION/TRAINING PROGRAM

## 2025-06-06 PROCEDURE — 1159F MED LIST DOCD IN RCRD: CPT | Performed by: STUDENT IN AN ORGANIZED HEALTH CARE EDUCATION/TRAINING PROGRAM

## 2025-06-06 PROCEDURE — 3074F SYST BP LT 130 MM HG: CPT | Performed by: STUDENT IN AN ORGANIZED HEALTH CARE EDUCATION/TRAINING PROGRAM

## 2025-06-06 PROCEDURE — 1126F AMNT PAIN NOTED NONE PRSNT: CPT | Performed by: STUDENT IN AN ORGANIZED HEALTH CARE EDUCATION/TRAINING PROGRAM

## 2025-06-06 PROCEDURE — 3078F DIAST BP <80 MM HG: CPT | Performed by: STUDENT IN AN ORGANIZED HEALTH CARE EDUCATION/TRAINING PROGRAM

## 2025-06-19 ENCOUNTER — OFFICE VISIT (OUTPATIENT)
Age: 75
End: 2025-06-19
Payer: MEDICARE

## 2025-06-19 VITALS
SYSTOLIC BLOOD PRESSURE: 127 MMHG | HEIGHT: 65 IN | BODY MASS INDEX: 38.82 KG/M2 | WEIGHT: 233 LBS | DIASTOLIC BLOOD PRESSURE: 62 MMHG | HEART RATE: 93 BPM

## 2025-06-19 DIAGNOSIS — G47.33 OBSTRUCTIVE SLEEP APNEA ON CPAP: ICD-10-CM

## 2025-06-19 DIAGNOSIS — I27.20 PULMONARY HYPERTENSION (HCC): ICD-10-CM

## 2025-06-19 DIAGNOSIS — I10 PRIMARY HYPERTENSION: ICD-10-CM

## 2025-06-19 DIAGNOSIS — I44.7 LBBB (LEFT BUNDLE BRANCH BLOCK): Primary | ICD-10-CM

## 2025-06-19 DIAGNOSIS — I50.32 CHRONIC DIASTOLIC CONGESTIVE HEART FAILURE (HCC): ICD-10-CM

## 2025-06-19 PROCEDURE — 1126F AMNT PAIN NOTED NONE PRSNT: CPT | Performed by: INTERNAL MEDICINE

## 2025-06-19 PROCEDURE — 3078F DIAST BP <80 MM HG: CPT | Performed by: INTERNAL MEDICINE

## 2025-06-19 PROCEDURE — 99214 OFFICE O/P EST MOD 30 MIN: CPT | Performed by: INTERNAL MEDICINE

## 2025-06-19 PROCEDURE — 1123F ACP DISCUSS/DSCN MKR DOCD: CPT | Performed by: INTERNAL MEDICINE

## 2025-06-19 PROCEDURE — 3074F SYST BP LT 130 MM HG: CPT | Performed by: INTERNAL MEDICINE

## 2025-06-19 NOTE — PROGRESS NOTES
[address] [facility], [place] [address]  PHONE: [phone]     25    NAME:  Rosey Pavon  : 1950  MRN: [id number]       SUBJECTIVE:   Rosey Pavon is a 75 y.o. female seen for a follow up visit regarding the following:     Chief Complaint   Patient presents with    Congestive Heart Failure     Echo        HPI:     Breast CA in , CA free now.   Echo 10/2023: normal EF, mod PHTN  C 2025:  no CAD seen  Echo 2025:  normal EF, mod MS, mod TR and pHTN     Some GRAVES since admission, no CP, pressure.  Edema ok on diuretics.   NO new angina.   No palp.    Patient denies recent history of orthopnea, PND, excessive dizziness and/or syncope.     On daily lasix now.   Off HCTZ.         Past Medical History, Past Surgical History, Family history, Social History, and Medications were all reviewed with the patient today and updated as necessary.     Current Outpatient Medications   Medication Sig Dispense Refill    albuterol sulfate HFA (PROVENTIL HFA) 108 (90 Base) MCG/ACT inhaler Inhale 2 puffs into the lungs every 6 hours as needed for Wheezing 18 g 0    meloxicam (MOBIC) 3.75 MG TABS split-tablet Take 2 split-tabs by mouth daily      diphenoxylate-atropine (LOMOTIL) 2.5-0.025 MG per tablet Take 1 tablet by mouth 4 times daily as needed for Diarrhea.      pantoprazole (PROTONIX) 40 MG tablet Take 1 tablet by mouth daily 90 tablet 3    famotidine (PEPCID) 20 MG tablet Take 1 tablet by mouth nightly 60 tablet 5    ipratropium (ATROVENT) 0.03 % nasal spray 2 sprays by Each Nostril route 2 times daily 1 each 11    Cholecalciferol (VITAMIN D3) 250 MCG (87926 UT) CAPS Take by mouth 1 cap 5 days per week      furosemide (LASIX) 20 MG tablet Take 1 tablet by mouth daily 90 tablet 1    acetaminophen (TYLENOL) 325 MG tablet 2 tablets as needed Orally every 6 hrs      diclofenac sodium (VOLTAREN) 1 % GEL       SYNTHROID 112 MCG tablet       fexofenadine (ALLEGRA) 180 MG tablet

## 2025-07-03 ENCOUNTER — TELEPHONE (OUTPATIENT)
Age: 75
End: 2025-07-03

## 2025-07-03 NOTE — TELEPHONE ENCOUNTER
Pt would like to know the name of the Family Doctor that Dr. Clark mentioned to her. She is not happy with her current PCP. States the first name of the doctor Dr. Clark mentioned was Rogelio

## 2025-08-11 ENCOUNTER — TRANSCRIBE ORDERS (OUTPATIENT)
Dept: SCHEDULING | Age: 75
End: 2025-08-11

## 2025-08-11 DIAGNOSIS — Z12.31 VISIT FOR SCREENING MAMMOGRAM: Primary | ICD-10-CM

## 2025-08-19 ENCOUNTER — OFFICE VISIT (OUTPATIENT)
Dept: PULMONOLOGY | Age: 75
End: 2025-08-19
Payer: MEDICARE

## 2025-08-19 VITALS
WEIGHT: 233 LBS | DIASTOLIC BLOOD PRESSURE: 61 MMHG | HEIGHT: 65 IN | SYSTOLIC BLOOD PRESSURE: 107 MMHG | TEMPERATURE: 97.2 F | HEART RATE: 99 BPM | OXYGEN SATURATION: 96 % | BODY MASS INDEX: 38.82 KG/M2 | RESPIRATION RATE: 18 BRPM

## 2025-08-19 DIAGNOSIS — R60.0 BILATERAL LOWER EXTREMITY EDEMA: ICD-10-CM

## 2025-08-19 DIAGNOSIS — I27.20 PULMONARY HYPERTENSION (HCC): Primary | ICD-10-CM

## 2025-08-19 DIAGNOSIS — G47.33 OSA (OBSTRUCTIVE SLEEP APNEA): ICD-10-CM

## 2025-08-19 DIAGNOSIS — R06.09 DYSPNEA ON EXERTION: ICD-10-CM

## 2025-08-19 PROCEDURE — 3074F SYST BP LT 130 MM HG: CPT | Performed by: STUDENT IN AN ORGANIZED HEALTH CARE EDUCATION/TRAINING PROGRAM

## 2025-08-19 PROCEDURE — 1160F RVW MEDS BY RX/DR IN RCRD: CPT | Performed by: STUDENT IN AN ORGANIZED HEALTH CARE EDUCATION/TRAINING PROGRAM

## 2025-08-19 PROCEDURE — 99215 OFFICE O/P EST HI 40 MIN: CPT | Performed by: STUDENT IN AN ORGANIZED HEALTH CARE EDUCATION/TRAINING PROGRAM

## 2025-08-19 PROCEDURE — 3078F DIAST BP <80 MM HG: CPT | Performed by: STUDENT IN AN ORGANIZED HEALTH CARE EDUCATION/TRAINING PROGRAM

## 2025-08-19 PROCEDURE — 1126F AMNT PAIN NOTED NONE PRSNT: CPT | Performed by: STUDENT IN AN ORGANIZED HEALTH CARE EDUCATION/TRAINING PROGRAM

## 2025-08-19 PROCEDURE — 1159F MED LIST DOCD IN RCRD: CPT | Performed by: STUDENT IN AN ORGANIZED HEALTH CARE EDUCATION/TRAINING PROGRAM

## 2025-08-19 PROCEDURE — 1123F ACP DISCUSS/DSCN MKR DOCD: CPT | Performed by: STUDENT IN AN ORGANIZED HEALTH CARE EDUCATION/TRAINING PROGRAM

## 2025-08-19 RX ORDER — FUROSEMIDE 20 MG/1
40 TABLET ORAL DAILY
Qty: 90 TABLET | Refills: 6 | Status: SHIPPED | OUTPATIENT
Start: 2025-08-19

## 2025-08-21 ENCOUNTER — TELEPHONE (OUTPATIENT)
Dept: PULMONOLOGY | Age: 75
End: 2025-08-21

## 2025-09-03 ENCOUNTER — HOSPITAL ENCOUNTER (OUTPATIENT)
Age: 75
Setting detail: OUTPATIENT SURGERY
Discharge: HOME | End: 2025-09-03
Attending: INTERNAL MEDICINE | Admitting: INTERNAL MEDICINE
Payer: MEDICARE

## 2025-09-03 VITALS
TEMPERATURE: 98.1 F | DIASTOLIC BLOOD PRESSURE: 80 MMHG | SYSTOLIC BLOOD PRESSURE: 135 MMHG | OXYGEN SATURATION: 97 % | BODY MASS INDEX: 38.82 KG/M2 | WEIGHT: 233 LBS | HEART RATE: 92 BPM | HEIGHT: 65 IN

## 2025-09-03 DIAGNOSIS — I27.20 PULMONARY HTN (HCC): ICD-10-CM

## 2025-09-03 LAB
ANION GAP SERPL CALC-SCNC: 9 MMOL/L (ref 7–16)
BASOPHILS # BLD: 0.03 K/UL (ref 0–0.2)
BASOPHILS NFR BLD: 0.7 % (ref 0–2)
BUN SERPL-MCNC: 9 MG/DL (ref 8–23)
CALCIUM SERPL-MCNC: 9.5 MG/DL (ref 8.8–10.2)
CHLORIDE SERPL-SCNC: 107 MMOL/L (ref 98–107)
CO2 SERPL-SCNC: 25 MMOL/L (ref 20–29)
CREAT SERPL-MCNC: 0.83 MG/DL (ref 0.6–1.1)
DIFFERENTIAL METHOD BLD: ABNORMAL
ECHO BSA: 2.2 M2
EKG ATRIAL RATE: 82 BPM
EKG DIAGNOSIS: NORMAL
EKG P AXIS: 54 DEGREES
EKG P-R INTERVAL: 134 MS
EKG Q-T INTERVAL: 430 MS
EKG QRS DURATION: 132 MS
EKG QTC CALCULATION (BAZETT): 502 MS
EKG R AXIS: 20 DEGREES
EKG T AXIS: 91 DEGREES
EKG VENTRICULAR RATE: 82 BPM
EOSINOPHIL # BLD: 0.22 K/UL (ref 0–0.8)
EOSINOPHIL NFR BLD: 5.2 % (ref 0.5–7.8)
ERYTHROCYTE [DISTWIDTH] IN BLOOD BY AUTOMATED COUNT: 13.9 % (ref 11.9–14.6)
GLUCOSE SERPL-MCNC: 100 MG/DL (ref 70–99)
HCT VFR BLD AUTO: 37.8 % (ref 35.8–46.3)
HGB BLD-MCNC: 11.8 G/DL (ref 11.7–15.4)
IMM GRANULOCYTES # BLD AUTO: 0.01 K/UL (ref 0–0.5)
IMM GRANULOCYTES NFR BLD AUTO: 0.2 % (ref 0–5)
LYMPHOCYTES # BLD: 1.16 K/UL (ref 0.5–4.6)
LYMPHOCYTES NFR BLD: 27.4 % (ref 13–44)
MAGNESIUM SERPL-MCNC: 2 MG/DL (ref 1.8–2.4)
MCH RBC QN AUTO: 29.2 PG (ref 26.1–32.9)
MCHC RBC AUTO-ENTMCNC: 31.2 G/DL (ref 31.4–35)
MCV RBC AUTO: 93.6 FL (ref 82–102)
MONOCYTES # BLD: 0.37 K/UL (ref 0.1–1.3)
MONOCYTES NFR BLD: 8.7 % (ref 4–12)
NEUTS SEG # BLD: 2.45 K/UL (ref 1.7–8.2)
NEUTS SEG NFR BLD: 57.8 % (ref 43–78)
NRBC # BLD: 0 K/UL (ref 0–0.2)
PLATELET # BLD AUTO: 122 K/UL (ref 150–450)
PMV BLD AUTO: 11.2 FL (ref 9.4–12.3)
POTASSIUM SERPL-SCNC: 4.1 MMOL/L (ref 3.5–5.1)
RBC # BLD AUTO: 4.04 M/UL (ref 4.05–5.2)
SODIUM SERPL-SCNC: 141 MMOL/L (ref 136–145)
WBC # BLD AUTO: 4.2 K/UL (ref 4.3–11.1)

## 2025-09-03 PROCEDURE — 85025 COMPLETE CBC W/AUTO DIFF WBC: CPT

## 2025-09-03 PROCEDURE — C1894 INTRO/SHEATH, NON-LASER: HCPCS | Performed by: INTERNAL MEDICINE

## 2025-09-03 PROCEDURE — 80048 BASIC METABOLIC PNL TOTAL CA: CPT

## 2025-09-03 PROCEDURE — 83735 ASSAY OF MAGNESIUM: CPT

## 2025-09-03 PROCEDURE — 93010 ELECTROCARDIOGRAM REPORT: CPT | Performed by: INTERNAL MEDICINE

## 2025-09-03 PROCEDURE — 93005 ELECTROCARDIOGRAM TRACING: CPT | Performed by: INTERNAL MEDICINE

## 2025-09-03 PROCEDURE — 93451 RIGHT HEART CATH: CPT | Performed by: INTERNAL MEDICINE

## 2025-09-03 PROCEDURE — C1751 CATH, INF, PER/CENT/MIDLINE: HCPCS | Performed by: INTERNAL MEDICINE

## 2025-09-03 RX ORDER — 0.9 % SODIUM CHLORIDE 0.9 %
500 INTRAVENOUS SOLUTION INTRAVENOUS PRN
OUTPATIENT
Start: 2025-09-03

## 2025-09-03 RX ORDER — SODIUM CHLORIDE 0.9 % (FLUSH) 0.9 %
5-40 SYRINGE (ML) INJECTION PRN
OUTPATIENT
Start: 2025-09-03

## 2025-09-03 RX ORDER — ACETAMINOPHEN 325 MG/1
650 TABLET ORAL EVERY 4 HOURS PRN
OUTPATIENT
Start: 2025-09-03

## 2025-09-03 RX ORDER — ATROPINE SULFATE 0.4 MG/ML
0.5 INJECTION, SOLUTION INTRAVENOUS
OUTPATIENT
Start: 2025-09-03

## 2025-09-03 RX ORDER — SODIUM CHLORIDE 0.9 % (FLUSH) 0.9 %
5-40 SYRINGE (ML) INJECTION EVERY 12 HOURS SCHEDULED
OUTPATIENT
Start: 2025-09-03

## 2025-09-03 RX ORDER — SODIUM CHLORIDE 9 MG/ML
INJECTION, SOLUTION INTRAVENOUS PRN
OUTPATIENT
Start: 2025-09-03

## 2025-09-03 RX ORDER — SODIUM CHLORIDE 9 MG/ML
INJECTION, SOLUTION INTRAVENOUS CONTINUOUS
Status: DISCONTINUED | OUTPATIENT
Start: 2025-09-03 | End: 2025-09-03 | Stop reason: HOSPADM

## 2025-09-03 ASSESSMENT — PAIN SCALES - GENERAL
PAINLEVEL_OUTOF10: 0

## (undated) DEVICE — REM POLYHESIVE ADULT PATIENT RETURN ELECTRODE: Brand: VALLEYLAB

## (undated) DEVICE — SHEET, T, LAPAROTOMY, STERILE: Brand: MEDLINE

## (undated) DEVICE — TUBING, SUCTION, 3/16" X 10', STRAIGHT: Brand: MEDLINE

## (undated) DEVICE — SUTURE VCRL SZ 3-0 L27IN ABSRB UD L26MM SH 1/2 CIR J416H

## (undated) DEVICE — DRAPE XR C ARM 41X74IN LF --

## (undated) DEVICE — STRIP,CLOSURE,WOUND,MEDI-STRIP,1/2X4: Brand: MEDLINE

## (undated) DEVICE — COMPRESSION DEVICE 24 CM 10 ML RIGHT

## (undated) DEVICE — COVER PRB L11.9CM TAPR L3.8X61CM TRNSPAR SFT PLIABLE

## (undated) DEVICE — SPONGE GZ W6XL6IN COT 6 PLY SUP FLUF EXTRA ABSRB FOR PRE OP

## (undated) DEVICE — SURGICAL PROCEDURE PACK BASIC ST FRANCIS

## (undated) DEVICE — JELLY LUBRICATING 10GM PREFIL SYR LUBE

## (undated) DEVICE — SOLUTION IRRIG 1000ML 0.9% SOD CHL USP POUR PLAS BTL

## (undated) DEVICE — GLOVE SURG SZ 75 CRM LTX FREE POLYISOPRENE POLYMER BEAD ANTI

## (undated) DEVICE — GUIDE NDL ST W/ 14 X 147CM TELESCOPICALLY FLD CIV FLX CVR

## (undated) DEVICE — PAD,NON-ADHERENT,3X8,STERILE,LF,1/PK: Brand: MEDLINE

## (undated) DEVICE — MINOR SPLIT GENERAL: Brand: MEDLINE INDUSTRIES, INC.

## (undated) DEVICE — CATHETER GUID 5FR L100CM DIA0.058IN NYL SHFT EBU3.0 W/O

## (undated) DEVICE — SOLUTION IV 1000ML 0.9% SOD CHL

## (undated) DEVICE — DRAPE,LITHOTOMY,STERILE: Brand: MEDLINE

## (undated) DEVICE — AMD ANTIMICROBIAL GAUZE SPONGES,12 PLY USP TYPE VII, 0.2% POLYHEXAMETHYLENE BIGUANIDE HCI (PHMB): Brand: CURITY

## (undated) DEVICE — GOWN,BREATHABLE SLV,AURORA,LG,STRL: Brand: MEDLINE

## (undated) DEVICE — SURGIFOAM SPNG SZ 100

## (undated) DEVICE — CONTAINER SPEC COLLCTN 8 OZ W/ CAP PLAS STRL

## (undated) DEVICE — SUT SLK 2-0SH 30IN BLK --

## (undated) DEVICE — GARMENT,MEDLINE,DVT,INT,CALF,MED, GEN2: Brand: MEDLINE

## (undated) DEVICE — CATHETER DIAG AD 5FR L100CM COR GRY HYDRPHLC NYL MPA 2 W/ 2

## (undated) DEVICE — CATHETER DIAG AD 5FR L100CM COR NYL JUDKINS R 5 DILATED

## (undated) DEVICE — FIXED CORE WIRE GUIDE SAFE-T-J, CURVED: Brand: COOK

## (undated) DEVICE — BUTTON SWITCH PENCIL BLADE ELECTRODE, HOLSTER: Brand: EDGE

## (undated) DEVICE — NEEDLE HYPO 21GA L1.5IN INTRAMUSCULAR S STL LATCH BVL UP

## (undated) DEVICE — CATHETER ANGIO 4FR L100CM S STL NYL JL3.5 3 SEG BRAID SFT

## (undated) DEVICE — Device

## (undated) DEVICE — SHEATH INTRO STR 0.021 IN 7 FRX10 CM FLX GLIDESHEATH SLENDER

## (undated) DEVICE — GUIDEWIRE 035IN 210CM PTFE COAT FIX COR J TIP 15MM FIRM BODY

## (undated) DEVICE — PREP SKN CHLRAPRP APL 26ML STR --

## (undated) DEVICE — SUTURE PROL SZ 2-0 L30IN NONABSORBABLE BLU L26MM CT-2 1/2 8411H

## (undated) DEVICE — GLIDESHEATH SLENDER STAINLESS STEEL KIT: Brand: GLIDESHEATH SLENDER

## (undated) DEVICE — ELECTRODE PT RET AD L9FT HI MOIST COND ADH HYDRGEL CORDED

## (undated) DEVICE — MASTISOL ADHESIVE LIQ 2/3ML

## (undated) DEVICE — SUT PROL 2-0 30IN CT2 BLU --

## (undated) DEVICE — (D)PREP SKN CHLRAPRP APPL 26ML -- CONVERT TO ITEM 371833

## (undated) DEVICE — CATHETER ANGIO 5FR L100CM GRY S STL NYL JL3.5 3 SEG BRAID L

## (undated) DEVICE — LEGGINGS, PAIR, 31X48, STERILE: Brand: MEDLINE

## (undated) DEVICE — CATHETER DIAG AD 5FR L110CM 145DEG COR GRY HYDRPHLC NYL

## (undated) DEVICE — DRAPE TWL SURG 16X26IN BLU ORB04] ALLCARE INC]

## (undated) DEVICE — PAD,ABDOMINAL,5"X9",ST,LF,25/BX: Brand: MEDLINE INDUSTRIES, INC.

## (undated) DEVICE — SUTURE VCRL SZ 3-0 L27IN ABSRB UD L26MM CT-2 1/2 CIR J232H

## (undated) DEVICE — SUTURE VCRL SZ 4-0 L18IN ABSRB UD L19MM PS-2 3/8 CIR PRIM J496H

## (undated) DEVICE — SUTURE VCRL SZ 4-0 L27IN ABSRB UD L19MM PS-2 3/8 CIR PRIM J426H

## (undated) DEVICE — GUIDEWIRE WITH ICE™ HYDROPHILIC COATING: Brand: CHOICE™ PT

## (undated) DEVICE — 3M™ TEGADERM™ TRANSPARENT FILM DRESSING FRAME STYLE, 1626W, 4 IN X 4-3/4 IN (10 CM X 12 CM), 50/CT 4CT/CASE: Brand: 3M™ TEGADERM™

## (undated) DEVICE — APPLIER CLP L9.38IN M LIG TI DISP STR RNG HNDL LIGACLP

## (undated) DEVICE — CONTAINER,SPECIMEN,O.R.STRL,4.5OZ: Brand: MEDLINE

## (undated) DEVICE — CATHETER THRMDIL 7FR L110CM STD PULM ART 4 INFUS LUMN SWN

## (undated) DEVICE — AMD ANTIMICROBIAL NON-ADHERENT PAD,0.2% POLYHEXAMETHYLENE BIGUANIDE HCI (PHMB): Brand: TELFA